# Patient Record
Sex: FEMALE | Race: WHITE | Employment: OTHER | URBAN - METROPOLITAN AREA
[De-identification: names, ages, dates, MRNs, and addresses within clinical notes are randomized per-mention and may not be internally consistent; named-entity substitution may affect disease eponyms.]

---

## 2017-03-02 ENCOUNTER — HOSPITAL ENCOUNTER (EMERGENCY)
Age: 77
Discharge: HOME | End: 2017-03-02
Attending: EMERGENCY MEDICINE
Payer: MEDICARE

## 2017-03-02 VITALS
TEMPERATURE: 97.6 F | RESPIRATION RATE: 19 BRPM | OXYGEN SATURATION: 97 % | BODY MASS INDEX: 21.99 KG/M2 | WEIGHT: 132 LBS | HEART RATE: 74 BPM | SYSTOLIC BLOOD PRESSURE: 153 MMHG | HEIGHT: 65 IN | DIASTOLIC BLOOD PRESSURE: 67 MMHG
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
## 2017-03-02 DIAGNOSIS — N39.0 URINARY TRACT INFECTION WITHOUT HEMATURIA, SITE UNSPECIFIED: Primary | ICD-10-CM

## 2017-03-02 DIAGNOSIS — R53.1 WEAKNESS: ICD-10-CM

## 2017-03-02 LAB
ANION GAP BLD CALC-SCNC: 12 MMOL/L (ref 7–16)
APPEARANCE UR: ABNORMAL
BACTERIA URNS QL MICRO: ABNORMAL /HPF
BASOPHILS # BLD AUTO: 0 K/UL (ref 0–0.2)
BASOPHILS # BLD: 0 % (ref 0–2)
BILIRUB UR QL: NEGATIVE
BUN SERPL-MCNC: 12 MG/DL (ref 8–23)
CALCIUM SERPL-MCNC: 10.1 MG/DL (ref 8.3–10.4)
CASTS URNS QL MICRO: ABNORMAL /LPF
CHLORIDE SERPL-SCNC: 95 MMOL/L (ref 98–107)
CO2 SERPL-SCNC: 27 MMOL/L (ref 21–32)
COLOR UR: YELLOW
CREAT SERPL-MCNC: 0.83 MG/DL (ref 0.6–1)
DIFFERENTIAL METHOD BLD: ABNORMAL
EOSINOPHIL # BLD: 0.1 K/UL (ref 0–0.8)
EOSINOPHIL NFR BLD: 1 % (ref 0.5–7.8)
EPI CELLS #/AREA URNS HPF: 0 /HPF
ERYTHROCYTE [DISTWIDTH] IN BLOOD BY AUTOMATED COUNT: 12.3 % (ref 11.9–14.6)
GLUCOSE SERPL-MCNC: 141 MG/DL (ref 65–100)
GLUCOSE UR STRIP.AUTO-MCNC: NEGATIVE MG/DL
HCT VFR BLD AUTO: 42.3 % (ref 35.8–46.3)
HGB BLD-MCNC: 14.7 G/DL (ref 11.7–15.4)
HGB UR QL STRIP: ABNORMAL
IMM GRANULOCYTES # BLD: 0 K/UL (ref 0–0.5)
IMM GRANULOCYTES NFR BLD AUTO: 0.2 % (ref 0–5)
KETONES UR QL STRIP.AUTO: NEGATIVE MG/DL
LEUKOCYTE ESTERASE UR QL STRIP.AUTO: ABNORMAL
LYMPHOCYTES # BLD AUTO: 17 % (ref 13–44)
LYMPHOCYTES # BLD: 1.5 K/UL (ref 0.5–4.6)
MCH RBC QN AUTO: 30.8 PG (ref 26.1–32.9)
MCHC RBC AUTO-ENTMCNC: 34.8 G/DL (ref 31.4–35)
MCV RBC AUTO: 88.7 FL (ref 79.6–97.8)
MONOCYTES # BLD: 0.7 K/UL (ref 0.1–1.3)
MONOCYTES NFR BLD AUTO: 8 % (ref 4–12)
NEUTS SEG # BLD: 6.6 K/UL (ref 1.7–8.2)
NEUTS SEG NFR BLD AUTO: 74 % (ref 43–78)
NITRITE UR QL STRIP.AUTO: NEGATIVE
PH UR STRIP: 7 [PH] (ref 5–9)
PLATELET # BLD AUTO: 303 K/UL (ref 150–450)
PMV BLD AUTO: 10.6 FL (ref 10.8–14.1)
POTASSIUM SERPL-SCNC: 3.2 MMOL/L (ref 3.5–5.1)
PROT UR STRIP-MCNC: NEGATIVE MG/DL
RBC # BLD AUTO: 4.77 M/UL (ref 4.05–5.25)
RBC #/AREA URNS HPF: ABNORMAL /HPF
SODIUM SERPL-SCNC: 134 MMOL/L (ref 136–145)
SP GR UR REFRACTOMETRY: 1.01 (ref 1–1.02)
UROBILINOGEN UR QL STRIP.AUTO: 0.2 EU/DL (ref 0.2–1)
WBC # BLD AUTO: 8.9 K/UL (ref 4.3–11.1)
WBC URNS QL MICRO: >100 /HPF

## 2017-03-02 PROCEDURE — 87086 URINE CULTURE/COLONY COUNT: CPT | Performed by: EMERGENCY MEDICINE

## 2017-03-02 PROCEDURE — 85025 COMPLETE CBC W/AUTO DIFF WBC: CPT | Performed by: EMERGENCY MEDICINE

## 2017-03-02 PROCEDURE — 96365 THER/PROPH/DIAG IV INF INIT: CPT | Performed by: EMERGENCY MEDICINE

## 2017-03-02 PROCEDURE — 99284 EMERGENCY DEPT VISIT MOD MDM: CPT | Performed by: EMERGENCY MEDICINE

## 2017-03-02 PROCEDURE — 81003 URINALYSIS AUTO W/O SCOPE: CPT | Performed by: EMERGENCY MEDICINE

## 2017-03-02 PROCEDURE — 87088 URINE BACTERIA CULTURE: CPT | Performed by: EMERGENCY MEDICINE

## 2017-03-02 PROCEDURE — 74011000258 HC RX REV CODE- 258: Performed by: EMERGENCY MEDICINE

## 2017-03-02 PROCEDURE — 80048 BASIC METABOLIC PNL TOTAL CA: CPT | Performed by: EMERGENCY MEDICINE

## 2017-03-02 PROCEDURE — 74011250636 HC RX REV CODE- 250/636: Performed by: EMERGENCY MEDICINE

## 2017-03-02 PROCEDURE — 81001 URINALYSIS AUTO W/SCOPE: CPT | Performed by: EMERGENCY MEDICINE

## 2017-03-02 PROCEDURE — 87186 SC STD MICRODIL/AGAR DIL: CPT | Performed by: EMERGENCY MEDICINE

## 2017-03-02 RX ORDER — CEPHALEXIN 500 MG/1
500 CAPSULE ORAL 3 TIMES DAILY
Qty: 21 CAP | Refills: 0 | Status: SHIPPED | OUTPATIENT
Start: 2017-03-02 | End: 2017-03-09

## 2017-03-02 RX ORDER — SODIUM CHLORIDE 0.9 % (FLUSH) 0.9 %
5-10 SYRINGE (ML) INJECTION EVERY 8 HOURS
Status: DISCONTINUED | OUTPATIENT
Start: 2017-03-02 | End: 2017-03-03 | Stop reason: HOSPADM

## 2017-03-02 RX ORDER — SODIUM CHLORIDE 0.9 % (FLUSH) 0.9 %
5-10 SYRINGE (ML) INJECTION AS NEEDED
Status: DISCONTINUED | OUTPATIENT
Start: 2017-03-02 | End: 2017-03-03 | Stop reason: HOSPADM

## 2017-03-02 RX ADMIN — SODIUM CHLORIDE 1000 ML: 900 INJECTION, SOLUTION INTRAVENOUS at 20:55

## 2017-03-02 RX ADMIN — CEFTRIAXONE 1 G: 1 INJECTION, POWDER, FOR SOLUTION INTRAMUSCULAR; INTRAVENOUS at 20:55

## 2017-03-02 NOTE — ED TRIAGE NOTES
Pt in c/o buttock pain after fall. Pt states she tripped and fell back. Denies hitting head denies loc. Pt son states pt has had increased confusion. States pt poured her wellbutrin out yesterday. Pt with abrasion to left forearm. Pt denies other complaints. Pt alert and oriented on triage.

## 2017-03-03 NOTE — DISCHARGE INSTRUCTIONS
Urinary Tract Infection in Women: Care Instructions  Your Care Instructions    A urinary tract infection, or UTI, is a general term for an infection anywhere between the kidneys and the urethra (where urine comes out). Most UTIs are bladder infections. They often cause pain or burning when you urinate. UTIs are caused by bacteria and can be cured with antibiotics. Be sure to complete your treatment so that the infection goes away. Follow-up care is a key part of your treatment and safety. Be sure to make and go to all appointments, and call your doctor if you are having problems. It's also a good idea to know your test results and keep a list of the medicines you take. How can you care for yourself at home? · Take your antibiotics as directed. Do not stop taking them just because you feel better. You need to take the full course of antibiotics. · Drink extra water and other fluids for the next day or two. This may help wash out the bacteria that are causing the infection. (If you have kidney, heart, or liver disease and have to limit fluids, talk with your doctor before you increase your fluid intake.)  · Avoid drinks that are carbonated or have caffeine. They can irritate the bladder. · Urinate often. Try to empty your bladder each time. · To relieve pain, take a hot bath or lay a heating pad set on low over your lower belly or genital area. Never go to sleep with a heating pad in place. To prevent UTIs  · Drink plenty of water each day. This helps you urinate often, which clears bacteria from your system. (If you have kidney, heart, or liver disease and have to limit fluids, talk with your doctor before you increase your fluid intake.)  · Consider adding cranberry juice to your diet. · Urinate when you need to. · Urinate right after you have sex. · Change sanitary pads often. · Avoid douches, bubble baths, feminine hygiene sprays, and other feminine hygiene products that have deodorants.   · After going to the bathroom, wipe from front to back. When should you call for help? Call your doctor now or seek immediate medical care if:  · Symptoms such as fever, chills, nausea, or vomiting get worse or appear for the first time. · You have new pain in your back just below your rib cage. This is called flank pain. · There is new blood or pus in your urine. · You have any problems with your antibiotic medicine. Watch closely for changes in your health, and be sure to contact your doctor if:  · You are not getting better after taking an antibiotic for 2 days. · Your symptoms go away but then come back. Where can you learn more? Go to http://luz marina-norris.info/. Enter Z340 in the search box to learn more about \"Urinary Tract Infection in Women: Care Instructions. \"  Current as of: August 12, 2016  Content Version: 11.1  © 4968-8995 [employer]. Care instructions adapted under license by [employer] (which disclaims liability or warranty for this information). If you have questions about a medical condition or this instruction, always ask your healthcare professional. [place] [address] any warranty or liability for your use of this information. Fatigue: Care Instructions  Your Care Instructions  Fatigue is a feeling of tiredness, exhaustion, or lack of energy. You may feel fatigue because of too much or not enough activity. It can also come from stress, lack of sleep, boredom, and poor diet. Many medical problems, such as viral infections, can cause fatigue. Emotional problems, especially depression, are often the cause of fatigue. Fatigue is most often a symptom of another problem. Treatment for fatigue depends on the cause. For example, if you have fatigue because you have a certain health problem, treating this problem also treats your fatigue. If depression or anxiety is the cause, treatment may help.   Follow-up care is a key part of your treatment and safety. Be sure to make and go to all appointments, and call your doctor if you are having problems. It's also a good idea to know your test results and keep a list of the medicines you take. How can you care for yourself at home? · Get regular exercise. But don't overdo it. Go back and forth between rest and exercise. · Get plenty of rest.  · Eat a healthy diet. Do not skip meals, especially breakfast.  · Reduce your use of caffeine, tobacco, and alcohol. Caffeine is most often found in coffee, tea, cola drinks, and chocolate. · Limit medicines that can cause fatigue. This includes tranquilizers and cold and allergy medicines. When should you call for help? Watch closely for changes in your health, and be sure to contact your doctor if:  · You have new symptoms such as fever or a rash. · Your fatigue gets worse. · You have been feeling down, depressed, or hopeless. Or you may have lost interest in things that you usually enjoy. · You are not getting better as expected. Where can you learn more? Go to http://luz marinaiPerceptionsnorris.info/. Enter X397 in the search box to learn more about \"Fatigue: Care Instructions. \"  Current as of: May 27, 2016  Content Version: 11.1  © 7972-0527 [employer]. Care instructions adapted under license by [employer] (which disclaims liability or warranty for this information). If you have questions about a medical condition or this instruction, always ask your healthcare professional. [place] [address] any warranty or liability for your use of this information. Preventing Falls: Care Instructions  Your Care Instructions  Getting around your home safely can be a challenge if you have injuries or health problems that make it easy for you to fall. Loose rugs and furniture in walkways are among the dangers for many older people who have problems walking or who have poor eyesight.  People who have conditions such as arthritis, osteoporosis, or dementia also have to be careful not to fall. You can make your home safer with a few simple measures. Follow-up care is a key part of your treatment and safety. Be sure to make and go to all appointments, and call your doctor if you are having problems. It's also a good idea to know your test results and keep a list of the medicines you take. How can you care for yourself at home? Taking care of yourself  · You may get dizzy if you do not drink enough water. To prevent dehydration, drink plenty of fluids, enough so that your urine is light yellow or clear like water. Choose water and other caffeine-free clear liquids. If you have kidney, heart, or liver disease and have to limit fluids, talk with your doctor before you increase the amount of fluids you drink. · Exercise regularly to improve your strength, muscle tone, and balance. Walk if you can. Swimming may be a good choice if you cannot walk easily. · Have your vision and hearing checked each year or any time you notice a change. If you have trouble seeing and hearing, you might not be able to avoid objects and could lose your balance. · Know the side effects of the medicines you take. Ask your doctor or pharmacist whether the medicines you take can affect your balance. Sleeping pills or sedatives can affect your balance. · Limit the amount of alcohol you drink. Alcohol can impair your balance and other senses. · Ask your doctor whether calluses or corns on your feet need to be removed. If you wear loose-fitting shoes because of calluses or corns, you can lose your balance and fall. · Talk to your doctor if you have numbness in your feet. Preventing falls at home  · Remove raised doorway thresholds, throw rugs, and clutter. Repair loose carpet or raised areas in the floor. · Move furniture and electrical cords to keep them out of walking paths.   · Use nonskid floor wax, and wipe up spills right away, especially on ceramic tile floors. · If you use a walker or cane, put rubber tips on it. If you use crutches, clean the bottoms of them regularly with an abrasive pad, such as steel wool. · Keep your house well lit, especially Abbi Carrie, and outside walkways. Use night-lights in areas such as hallways and bathrooms. Add extra light switches or use remote switches (such as switches that go on or off when you clap your hands) to make it easier to turn lights on if you have to get up during the night. · Install sturdy handrails on stairways. · Move items in your cabinets so that the things you use a lot are on the lower shelves (about waist level). · Keep a cordless phone and a flashlight with new batteries by your bed. If possible, put a phone in each of the main rooms of your house, or carry a cell phone in case you fall and cannot reach a phone. Or, you can wear a device around your neck or wrist. You push a button that sends a signal for help. · Wear low-heeled shoes that fit well and give your feet good support. Use footwear with nonskid soles. Check the heels and soles of your shoes for wear. Repair or replace worn heels or soles. · Do not wear socks without shoes on wood floors. · Walk on the grass when the sidewalks are slippery. If you live in an area that gets snow and ice in the winter, sprinkle salt on slippery steps and sidewalks. Preventing falls in the bath  · Install grab bars and nonskid mats inside and outside your shower or tub and near the toilet and sinks. · Use shower chairs and bath benches. · Use a hand-held shower head that will allow you to sit while showering. · Get into a tub or shower by putting the weaker leg in first. Get out of a tub or shower with your strong side first.  · Repair loose toilet seats and consider installing a raised toilet seat to make getting on and off the toilet easier. · Keep your bathroom door unlocked while you are in the shower.   Where can you learn more?  Go to http://luz marina-norris.info/. Enter [id number] [id number] in the search box to learn more about \"Preventing Falls: Care Instructions. \"  Current as of: August 4, 2016  Content Version: 11.1  © 0856-6208 [employer]. Care instructions adapted under license by [employer] (which disclaims liability or warranty for this information). If you have questions about a medical condition or this instruction, always ask your healthcare professional. [place] [address] any warranty or liability for your use of this information.

## 2017-03-03 NOTE — ED PROVIDER NOTES
HPI Comments: Patient complains of feeling poorly for the last week with unsteadiness and 2 falls over the last week. She denies any fever or chills, UTI symptoms, nausea or vomiting. Denies change in bowel habits. Patient is a 78 y.o. female presenting with fatigue. The history is provided by the patient and a relative. Fatigue   This is a new problem. The current episode started more than 2 days ago. The problem has been gradually worsening. There was no focality noted. Pertinent negatives include no focal weakness, no loss of sensation, no loss of balance, no slurred speech, no speech difficulty, no memory loss, no movement disorder, no agitation, no visual change, no auditory change, no mental status change and no disorientation. There has been no fever. Pertinent negatives include no shortness of breath, no chest pain, no vomiting, no altered mental status, no confusion, no headaches, no choking, no nausea, no bowel incontinence and no bladder incontinence. There were no medications administered prior to arrival. Associated medical issues do not include trauma, mood changes, bleeding disorder, seizures, dementia, CVA or clotting disorder. Past Medical History:   Diagnosis Date    Menopause        Past Surgical History:   Procedure Laterality Date    HX HYSTERECTOMY      HX OOPHORECTOMY           Family History:   Problem Relation Age of Onset    Breast Cancer Neg Hx        Social History     Social History    Marital status:      Spouse name: N/A    Number of children: N/A    Years of education: N/A     Occupational History    Not on file.      Social History Main Topics    Smoking status: Never Smoker    Smokeless tobacco: Not on file    Alcohol use Not on file    Drug use: Not on file    Sexual activity: Not on file     Other Topics Concern    Not on file     Social History Narrative         ALLERGIES: Codeine and Pcn [penicillins]    Review of Systems   Constitutional: Positive for activity change and fatigue. Negative for appetite change, chills and fever. Respiratory: Negative for choking and shortness of breath. Cardiovascular: Negative for chest pain. Gastrointestinal: Negative for bowel incontinence, nausea and vomiting. Genitourinary: Negative for bladder incontinence. Musculoskeletal: Negative for arthralgias, back pain, neck pain and neck stiffness. Neurological: Positive for weakness. Negative for focal weakness, speech difficulty, headaches and loss of balance. Psychiatric/Behavioral: Negative for agitation, confusion and memory loss. All other systems reviewed and are negative. Vitals:    03/02/17 1824 03/02/17 1923 03/02/17 1940   BP: 178/81 169/75 153/67   Pulse: 76 73 74   Resp: 19     Temp: 97.6 °F (36.4 °C)     SpO2: 98% 98% 97%   Weight: 59.9 kg (132 lb)     Height: 5' 5\" (1.651 m)              Physical Exam   Constitutional: She is oriented to person, place, and time. She appears well-developed and well-nourished. No distress. HENT:   Head: Normocephalic and atraumatic. Right Ear: Tympanic membrane and external ear normal.   Left Ear: Tympanic membrane and external ear normal.   Mouth/Throat: Oropharynx is clear and moist.   Eyes: Conjunctivae and EOM are normal. Pupils are equal, round, and reactive to light. Neck: Normal range of motion. Neck supple. No tracheal deviation present. Cardiovascular: Normal rate, regular rhythm, normal heart sounds and intact distal pulses. Exam reveals no gallop and no friction rub. No murmur heard. Pulmonary/Chest: Effort normal and breath sounds normal. No respiratory distress. She has no wheezes. Abdominal: Soft. Bowel sounds are normal. She exhibits no distension and no mass. There is no hepatosplenomegaly. There is no tenderness. There is no rebound and no guarding. Musculoskeletal: Normal range of motion. She exhibits no edema. Lymphadenopathy:     She has no cervical adenopathy. Neurological: She is alert and oriented to person, place, and time. She displays normal reflexes. No cranial nerve deficit. Skin: Skin is warm and dry. No rash noted. She is not diaphoretic. No erythema. Psychiatric: She has a normal mood and affect. Nursing note and vitals reviewed. MDM  Number of Diagnoses or Management Options  Falls, initial encounter: new and requires workup  Urinary tract infection without hematuria, site unspecified: new and requires workup  Weakness: new and requires workup     Amount and/or Complexity of Data Reviewed  Clinical lab tests: ordered and reviewed  Decide to obtain previous medical records or to obtain history from someone other than the patient: yes  Obtain history from someone other than the patient: yes  Review and summarize past medical records: yes    Risk of Complications, Morbidity, and/or Mortality  Presenting problems: high  Diagnostic procedures: minimal  Management options: moderate    Patient Progress  Patient progress: improved    ED Course       Procedures    The patient was observed in the ED.     Results Reviewed:      Recent Results (from the past 24 hour(s))   URINALYSIS W/ RFLX MICROSCOPIC    Collection Time: 03/02/17  6:30 PM   Result Value Ref Range    Color YELLOW      Appearance CLOUDY      Specific gravity 1.006 1.001 - 1.023      pH (UA) 7.0 5.0 - 9.0      Protein NEGATIVE  NEG mg/dL    Glucose NEGATIVE  mg/dL    Ketone NEGATIVE  NEG mg/dL    Bilirubin NEGATIVE  NEG      Blood TRACE (A) NEG      Urobilinogen 0.2 0.2 - 1.0 EU/dL    Nitrites NEGATIVE  NEG      Leukocyte Esterase LARGE (A) NEG      WBC >100 (H) 0 /hpf    RBC 0-3 0 /hpf    Epithelial cells 0 0 /hpf    Bacteria 4+ (H) 0 /hpf    Casts 3-5 0 /lpf   CBC WITH AUTOMATED DIFF    Collection Time: 03/02/17  8:51 PM   Result Value Ref Range    WBC 8.9 4.3 - 11.1 K/uL    RBC 4.77 4.05 - 5.25 M/uL    HGB 14.7 11.7 - 15.4 g/dL    HCT 42.3 35.8 - 46.3 %    MCV 88.7 79.6 - 97.8 FL    MCH 30.8 26.1 - 32.9 PG    MCHC 34.8 31.4 - 35.0 g/dL    RDW 12.3 11.9 - 14.6 %    PLATELET 750 903 - 158 K/uL    MPV 10.6 (L) 10.8 - 14.1 FL    DF AUTOMATED      NEUTROPHILS 74 43 - 78 %    LYMPHOCYTES 17 13 - 44 %    MONOCYTES 8 4.0 - 12.0 %    EOSINOPHILS 1 0.5 - 7.8 %    BASOPHILS 0 0.0 - 2.0 %    IMMATURE GRANULOCYTES 0.2 0.0 - 5.0 %    ABS. NEUTROPHILS 6.6 1.7 - 8.2 K/UL    ABS. LYMPHOCYTES 1.5 0.5 - 4.6 K/UL    ABS. MONOCYTES 0.7 0.1 - 1.3 K/UL    ABS. EOSINOPHILS 0.1 0.0 - 0.8 K/UL    ABS. BASOPHILS 0.0 0.0 - 0.2 K/UL    ABS. IMM. GRANS. 0.0 0.0 - 0.5 K/UL   METABOLIC PANEL, BASIC    Collection Time: 03/02/17  8:51 PM   Result Value Ref Range    Sodium 134 (L) 136 - 145 mmol/L    Potassium 3.2 (L) 3.5 - 5.1 mmol/L    Chloride 95 (L) 98 - 107 mmol/L    CO2 27 21 - 32 mmol/L    Anion gap 12 7 - 16 mmol/L    Glucose 141 (H) 65 - 100 mg/dL    BUN 12 8 - 23 MG/DL    Creatinine 0.83 0.6 - 1.0 MG/DL    GFR est AA >60 >60 ml/min/1.73m2    GFR est non-AA >60 >60 ml/min/1.73m2    Calcium 10.1 8.3 - 10.4 MG/DL       I discussed the results of all labs, procedures, radiographs, and treatments with the patient and available family. Treatment plan is agreed upon and the patient is ready for discharge. All voiced understanding of the discharge plan and medication instructions or changes as appropriate. Questions about treatment in the ED were answered. All were encouraged to return should symptoms worsen or new problems develop.

## 2017-03-05 NOTE — PROGRESS NOTES
Patient given Rocephin IV in the emergency room and sent home on cephalexin. Await final cultures and susceptibilities.

## 2017-03-06 LAB
BACTERIA SPEC CULT: ABNORMAL
BACTERIA SPEC CULT: ABNORMAL
SERVICE CMNT-IMP: ABNORMAL

## 2017-09-18 ENCOUNTER — APPOINTMENT (RX ONLY)
Dept: URBAN - METROPOLITAN AREA CLINIC 349 | Facility: CLINIC | Age: 77
Setting detail: DERMATOLOGY
End: 2017-09-18

## 2017-09-18 DIAGNOSIS — L21.8 OTHER SEBORRHEIC DERMATITIS: ICD-10-CM

## 2017-09-18 PROCEDURE — ? RECOMMENDATIONS

## 2017-09-18 PROCEDURE — ? PRESCRIPTION

## 2017-09-18 PROCEDURE — ? COUNSELING

## 2017-09-18 PROCEDURE — 99213 OFFICE O/P EST LOW 20 MIN: CPT

## 2017-09-18 RX ORDER — MOMETASONE FUROATE 1 MG/G
CREAM TOPICAL
Qty: 1 | Refills: 1 | Status: ERX | COMMUNITY
Start: 2017-09-18

## 2017-09-18 RX ORDER — KETOCONAZOLE 20 MG/G
CREAM TOPICAL
Qty: 1 | Refills: 1 | Status: ERX | COMMUNITY
Start: 2017-09-18

## 2017-09-18 RX ADMIN — MOMETASONE FUROATE: 1 CREAM TOPICAL at 00:00

## 2017-09-18 RX ADMIN — KETOCONAZOLE: 20 CREAM TOPICAL at 00:00

## 2017-09-18 NOTE — PROCEDURE: RECOMMENDATIONS
Recommendations (Free Text): Ketoconazole cream and mometasone, mix together apply to affected areas on ears nightly for 2-3 weeks
Detail Level: Zone

## 2017-10-02 ENCOUNTER — APPOINTMENT (RX ONLY)
Dept: URBAN - METROPOLITAN AREA CLINIC 349 | Facility: CLINIC | Age: 77
Setting detail: DERMATOLOGY
End: 2017-10-02

## 2017-10-02 DIAGNOSIS — L21.8 OTHER SEBORRHEIC DERMATITIS: ICD-10-CM | Status: IMPROVED

## 2017-10-02 PROCEDURE — 99213 OFFICE O/P EST LOW 20 MIN: CPT

## 2017-10-02 PROCEDURE — ? RECOMMENDATIONS

## 2017-10-02 PROCEDURE — ? COUNSELING

## 2017-10-02 NOTE — PROCEDURE: RECOMMENDATIONS
Detail Level: Zone
Recommendations (Free Text): Continue Ketoconazole cream and mometasone, mix together apply to affected areas on ears nightly for 2-3 weeks as needed for flares

## 2017-12-19 ENCOUNTER — HOSPITAL ENCOUNTER (OUTPATIENT)
Dept: MAMMOGRAPHY | Age: 77
Discharge: HOME | End: 2017-12-19
Attending: INTERNAL MEDICINE
Payer: MEDICARE

## 2017-12-19 DIAGNOSIS — Z12.31 VISIT FOR SCREENING MAMMOGRAM: ICD-10-CM

## 2017-12-19 PROCEDURE — 77067 SCR MAMMO BI INCL CAD: CPT

## 2018-03-15 ENCOUNTER — HOSPITAL ENCOUNTER (OUTPATIENT)
Dept: MAMMOGRAPHY | Age: 78
Discharge: HOME | End: 2018-03-15
Attending: INTERNAL MEDICINE
Payer: MEDICARE

## 2018-03-15 DIAGNOSIS — M81.8 OSTEOPOROSIS, IDIOPATHIC: ICD-10-CM

## 2018-03-15 PROCEDURE — 77080 DXA BONE DENSITY AXIAL: CPT

## 2018-04-16 ENCOUNTER — APPOINTMENT (RX ONLY)
Dept: URBAN - METROPOLITAN AREA CLINIC 349 | Facility: CLINIC | Age: 78
Setting detail: DERMATOLOGY
End: 2018-04-16

## 2018-04-16 DIAGNOSIS — L21.8 OTHER SEBORRHEIC DERMATITIS: ICD-10-CM | Status: WELL CONTROLLED

## 2018-04-16 PROBLEM — I10 ESSENTIAL (PRIMARY) HYPERTENSION: Status: ACTIVE | Noted: 2018-04-16

## 2018-04-16 PROCEDURE — ? RECOMMENDATIONS

## 2018-04-16 PROCEDURE — ? COUNSELING

## 2018-04-16 PROCEDURE — 99213 OFFICE O/P EST LOW 20 MIN: CPT

## 2018-08-09 ENCOUNTER — RX ONLY (OUTPATIENT)
Age: 78
Setting detail: RX ONLY
End: 2018-08-09

## 2018-08-09 RX ORDER — CLOBETASOL PROPIONATE 0.5 MG/G
CREAM TOPICAL
Qty: 1 | Refills: 0 | Status: ERX | COMMUNITY
Start: 2018-08-09

## 2018-10-15 ENCOUNTER — APPOINTMENT (RX ONLY)
Dept: URBAN - METROPOLITAN AREA CLINIC 349 | Facility: CLINIC | Age: 78
Setting detail: DERMATOLOGY
End: 2018-10-15

## 2018-10-15 DIAGNOSIS — L21.8 OTHER SEBORRHEIC DERMATITIS: ICD-10-CM | Status: STABLE

## 2018-10-15 PROBLEM — F41.9 ANXIETY DISORDER, UNSPECIFIED: Status: ACTIVE | Noted: 2018-10-15

## 2018-10-15 PROBLEM — L85.3 XEROSIS CUTIS: Status: ACTIVE | Noted: 2018-10-15

## 2018-10-15 PROBLEM — E13.9 OTHER SPECIFIED DIABETES MELLITUS WITHOUT COMPLICATIONS: Status: ACTIVE | Noted: 2018-10-15

## 2018-10-15 PROBLEM — Z85.828 PERSONAL HISTORY OF OTHER MALIGNANT NEOPLASM OF SKIN: Status: ACTIVE | Noted: 2018-10-15

## 2018-10-15 PROCEDURE — ? RECOMMENDATIONS

## 2018-10-15 PROCEDURE — ? PRESCRIPTION

## 2018-10-15 PROCEDURE — 99213 OFFICE O/P EST LOW 20 MIN: CPT

## 2018-10-15 PROCEDURE — ? COUNSELING

## 2018-10-15 RX ORDER — MOMETASONE FUROATE 1 MG/G
CREAM TOPICAL
Qty: 1 | Refills: 1 | Status: ERX

## 2018-10-15 RX ORDER — KETOCONAZOLE 20 MG/G
CREAM TOPICAL
Qty: 1 | Refills: 1 | Status: ERX

## 2018-10-15 NOTE — PROCEDURE: RECOMMENDATIONS
Detail Level: Zone
Recommendations (Free Text): Pt to finish Clobetasol then stop\\nKetoconazole 2% cream apply nightly for two weeks then as needed \\nMometasone cream apply twice a day for two weeks then as needed

## 2019-01-08 ENCOUNTER — HOSPITAL ENCOUNTER (OUTPATIENT)
Age: 79
Setting detail: OBSERVATION
Discharge: HOME | End: 2019-01-11
Attending: EMERGENCY MEDICINE | Admitting: HOSPITALIST
Payer: MEDICARE

## 2019-01-08 DIAGNOSIS — N39.0 URINARY TRACT INFECTION WITHOUT HEMATURIA, SITE UNSPECIFIED: Primary | ICD-10-CM

## 2019-01-08 DIAGNOSIS — E87.1 HYPONATREMIA: ICD-10-CM

## 2019-01-08 PROBLEM — E11.9 DM (DIABETES MELLITUS) (HCC): Status: ACTIVE | Noted: 2019-01-08

## 2019-01-08 PROBLEM — I10 HTN (HYPERTENSION): Status: ACTIVE | Noted: 2019-01-08

## 2019-01-08 PROBLEM — R19.7 DIARRHEA: Status: ACTIVE | Noted: 2019-01-08

## 2019-01-08 LAB
ANION GAP SERPL CALC-SCNC: 11 MMOL/L
BACTERIA URNS QL MICRO: 0 /HPF
BASOPHILS # BLD: 0.1 K/UL (ref 0–0.2)
BASOPHILS NFR BLD: 0 % (ref 0–2)
BUN SERPL-MCNC: 15 MG/DL (ref 8–23)
CALCIUM SERPL-MCNC: 9 MG/DL (ref 8.3–10.4)
CASTS URNS QL MICRO: ABNORMAL /LPF
CHLORIDE SERPL-SCNC: 86 MMOL/L (ref 98–107)
CO2 SERPL-SCNC: 21 MMOL/L (ref 21–32)
CREAT SERPL-MCNC: 0.81 MG/DL (ref 0.6–1)
DIFFERENTIAL METHOD BLD: ABNORMAL
EOSINOPHIL # BLD: 0.1 K/UL (ref 0–0.8)
EOSINOPHIL NFR BLD: 0 % (ref 0.5–7.8)
EPI CELLS #/AREA URNS HPF: ABNORMAL /HPF
ERYTHROCYTE [DISTWIDTH] IN BLOOD BY AUTOMATED COUNT: 11.5 % (ref 11.9–14.6)
GLUCOSE BLD STRIP.AUTO-MCNC: 172 MG/DL (ref 65–100)
GLUCOSE SERPL-MCNC: 128 MG/DL (ref 65–100)
HCT VFR BLD AUTO: 37.4 % (ref 35.8–46.3)
HGB BLD-MCNC: 13 G/DL (ref 11.7–15.4)
IMM GRANULOCYTES # BLD: 0.1 K/UL (ref 0–0.5)
IMM GRANULOCYTES NFR BLD AUTO: 1 % (ref 0–5)
LYMPHOCYTES # BLD: 0.8 K/UL (ref 0.5–4.6)
LYMPHOCYTES NFR BLD: 4 % (ref 13–44)
MCH RBC QN AUTO: 30.8 PG (ref 26.1–32.9)
MCHC RBC AUTO-ENTMCNC: 34.8 G/DL (ref 31.4–35)
MCV RBC AUTO: 88.6 FL (ref 79.6–97.8)
MONOCYTES # BLD: 1.1 K/UL (ref 0.1–1.3)
MONOCYTES NFR BLD: 6 % (ref 4–12)
NEUTS SEG # BLD: 16.2 K/UL (ref 1.7–8.2)
NEUTS SEG NFR BLD: 89 % (ref 43–78)
NRBC # BLD: 0 K/UL (ref 0–0.2)
PLATELET # BLD AUTO: 325 K/UL (ref 150–450)
PMV BLD AUTO: 10 FL (ref 9.4–12.3)
POTASSIUM SERPL-SCNC: 3.4 MMOL/L (ref 3.5–5.1)
RBC # BLD AUTO: 4.22 M/UL (ref 4.05–5.2)
RBC #/AREA URNS HPF: ABNORMAL /HPF
SODIUM SERPL-SCNC: 118 MMOL/L (ref 136–145)
WBC # BLD AUTO: 18.3 K/UL (ref 4.3–11.1)
WBC URNS QL MICRO: >100 /HPF

## 2019-01-08 PROCEDURE — 96365 THER/PROPH/DIAG IV INF INIT: CPT

## 2019-01-08 PROCEDURE — 74011250636 HC RX REV CODE- 250/636: Performed by: HOSPITALIST

## 2019-01-08 PROCEDURE — 87088 URINE BACTERIA CULTURE: CPT

## 2019-01-08 PROCEDURE — 82962 GLUCOSE BLOOD TEST: CPT

## 2019-01-08 PROCEDURE — 96361 HYDRATE IV INFUSION ADD-ON: CPT | Performed by: EMERGENCY MEDICINE

## 2019-01-08 PROCEDURE — 65270000029 HC RM PRIVATE

## 2019-01-08 PROCEDURE — 87186 SC STD MICRODIL/AGAR DIL: CPT

## 2019-01-08 PROCEDURE — 96360 HYDRATION IV INFUSION INIT: CPT | Performed by: EMERGENCY MEDICINE

## 2019-01-08 PROCEDURE — 84300 ASSAY OF URINE SODIUM: CPT

## 2019-01-08 PROCEDURE — 81015 MICROSCOPIC EXAM OF URINE: CPT

## 2019-01-08 PROCEDURE — 83935 ASSAY OF URINE OSMOLALITY: CPT

## 2019-01-08 PROCEDURE — 74011250636 HC RX REV CODE- 250/636: Performed by: EMERGENCY MEDICINE

## 2019-01-08 PROCEDURE — 74011000258 HC RX REV CODE- 258: Performed by: EMERGENCY MEDICINE

## 2019-01-08 PROCEDURE — 85025 COMPLETE CBC W/AUTO DIFF WBC: CPT

## 2019-01-08 PROCEDURE — 99284 EMERGENCY DEPT VISIT MOD MDM: CPT | Performed by: EMERGENCY MEDICINE

## 2019-01-08 PROCEDURE — 74011250637 HC RX REV CODE- 250/637: Performed by: HOSPITALIST

## 2019-01-08 PROCEDURE — 87086 URINE CULTURE/COLONY COUNT: CPT

## 2019-01-08 PROCEDURE — 65390000012 HC CONDITION CODE 44 OBSERVATION

## 2019-01-08 PROCEDURE — 80048 BASIC METABOLIC PNL TOTAL CA: CPT

## 2019-01-08 RX ORDER — INSULIN LISPRO 100 [IU]/ML
INJECTION, SOLUTION INTRAVENOUS; SUBCUTANEOUS
Status: DISCONTINUED | OUTPATIENT
Start: 2019-01-09 | End: 2019-01-11 | Stop reason: HOSPADM

## 2019-01-08 RX ORDER — ACETAMINOPHEN 325 MG/1
650 TABLET ORAL
Status: DISCONTINUED | OUTPATIENT
Start: 2019-01-08 | End: 2019-01-11 | Stop reason: HOSPADM

## 2019-01-08 RX ORDER — DEXTROSE 50 % IN WATER (D50W) INTRAVENOUS SYRINGE
25-50 AS NEEDED
Status: DISCONTINUED | OUTPATIENT
Start: 2019-01-08 | End: 2019-01-11 | Stop reason: HOSPADM

## 2019-01-08 RX ORDER — ONDANSETRON 2 MG/ML
4 INJECTION INTRAMUSCULAR; INTRAVENOUS
Status: DISCONTINUED | OUTPATIENT
Start: 2019-01-08 | End: 2019-01-11 | Stop reason: HOSPADM

## 2019-01-08 RX ORDER — SODIUM CHLORIDE 0.9 % (FLUSH) 0.9 %
5-40 SYRINGE (ML) INJECTION AS NEEDED
Status: DISCONTINUED | OUTPATIENT
Start: 2019-01-08 | End: 2019-01-11 | Stop reason: HOSPADM

## 2019-01-08 RX ORDER — SODIUM CHLORIDE 0.9 % (FLUSH) 0.9 %
5-40 SYRINGE (ML) INJECTION EVERY 8 HOURS
Status: DISCONTINUED | OUTPATIENT
Start: 2019-01-08 | End: 2019-01-11 | Stop reason: HOSPADM

## 2019-01-08 RX ORDER — LOPERAMIDE HYDROCHLORIDE 2 MG/1
4 CAPSULE ORAL ONCE
Status: COMPLETED | OUTPATIENT
Start: 2019-01-08 | End: 2019-01-08

## 2019-01-08 RX ORDER — SODIUM CHLORIDE 9 MG/ML
1000 INJECTION, SOLUTION INTRAVENOUS ONCE
Status: COMPLETED | OUTPATIENT
Start: 2019-01-08 | End: 2019-01-09

## 2019-01-08 RX ORDER — DEXTROSE 40 %
15 GEL (GRAM) ORAL AS NEEDED
Status: DISCONTINUED | OUTPATIENT
Start: 2019-01-08 | End: 2019-01-11 | Stop reason: HOSPADM

## 2019-01-08 RX ORDER — SODIUM CHLORIDE 9 MG/ML
50 INJECTION, SOLUTION INTRAVENOUS CONTINUOUS
Status: DISCONTINUED | OUTPATIENT
Start: 2019-01-08 | End: 2019-01-09

## 2019-01-08 RX ADMIN — SODIUM CHLORIDE 1000 ML: 900 INJECTION, SOLUTION INTRAVENOUS at 21:05

## 2019-01-08 RX ADMIN — LOPERAMIDE HYDROCHLORIDE 4 MG: 2 CAPSULE ORAL at 23:17

## 2019-01-08 RX ADMIN — SODIUM CHLORIDE 125 ML/HR: 900 INJECTION, SOLUTION INTRAVENOUS at 23:19

## 2019-01-08 RX ADMIN — Medication 5 ML: at 23:00

## 2019-01-08 RX ADMIN — CEFTRIAXONE 1 G: 1 INJECTION, POWDER, FOR SOLUTION INTRAMUSCULAR; INTRAVENOUS at 22:07

## 2019-01-09 PROBLEM — N39.0 UTI (URINARY TRACT INFECTION): Status: ACTIVE | Noted: 2019-01-09

## 2019-01-09 LAB
ANION GAP SERPL CALC-SCNC: 8 MMOL/L
BUN SERPL-MCNC: 11 MG/DL (ref 8–23)
CALCIUM SERPL-MCNC: 8.5 MG/DL (ref 8.3–10.4)
CHLORIDE SERPL-SCNC: 93 MMOL/L (ref 98–107)
CO2 SERPL-SCNC: 24 MMOL/L (ref 21–32)
CREAT SERPL-MCNC: 0.86 MG/DL (ref 0.6–1)
EST. AVERAGE GLUCOSE BLD GHB EST-MCNC: 131 MG/DL
GLUCOSE BLD STRIP.AUTO-MCNC: 165 MG/DL (ref 65–100)
GLUCOSE BLD STRIP.AUTO-MCNC: 166 MG/DL (ref 65–100)
GLUCOSE BLD STRIP.AUTO-MCNC: 196 MG/DL (ref 65–100)
GLUCOSE BLD STRIP.AUTO-MCNC: 219 MG/DL (ref 65–100)
GLUCOSE SERPL-MCNC: 137 MG/DL (ref 65–100)
HBA1C MFR BLD: 6.2 %
MAGNESIUM SERPL-MCNC: 1.9 MG/DL (ref 1.8–2.4)
OSMOLALITY SERPL: 267 MOSM/KG H2O
OSMOLALITY UR: 415 MOSM/KG H2O
PHOSPHATE SERPL-MCNC: 2.2 MG/DL (ref 2.3–3.7)
POTASSIUM SERPL-SCNC: 3.6 MMOL/L (ref 3.5–5.1)
SODIUM SERPL-SCNC: 125 MMOL/L (ref 136–145)
SODIUM SERPL-SCNC: 130 MMOL/L (ref 136–145)
SODIUM UR-SCNC: 53 MMOL/L
TSH SERPL DL<=0.005 MIU/L-ACNC: 2.58 UIU/ML

## 2019-01-09 PROCEDURE — 83036 HEMOGLOBIN GLYCOSYLATED A1C: CPT

## 2019-01-09 PROCEDURE — 83930 ASSAY OF BLOOD OSMOLALITY: CPT

## 2019-01-09 PROCEDURE — 74011636637 HC RX REV CODE- 636/637: Performed by: HOSPITALIST

## 2019-01-09 PROCEDURE — 84100 ASSAY OF PHOSPHORUS: CPT

## 2019-01-09 PROCEDURE — 99218 HC RM OBSERVATION: CPT

## 2019-01-09 PROCEDURE — 96366 THER/PROPH/DIAG IV INF ADDON: CPT

## 2019-01-09 PROCEDURE — 65390000012 HC CONDITION CODE 44 OBSERVATION

## 2019-01-09 PROCEDURE — 80048 BASIC METABOLIC PNL TOTAL CA: CPT

## 2019-01-09 PROCEDURE — 84443 ASSAY THYROID STIM HORMONE: CPT

## 2019-01-09 PROCEDURE — 74011250637 HC RX REV CODE- 250/637: Performed by: HOSPITALIST

## 2019-01-09 PROCEDURE — 96361 HYDRATE IV INFUSION ADD-ON: CPT

## 2019-01-09 PROCEDURE — 83735 ASSAY OF MAGNESIUM: CPT

## 2019-01-09 PROCEDURE — 74011000258 HC RX REV CODE- 258: Performed by: HOSPITALIST

## 2019-01-09 PROCEDURE — 74011250636 HC RX REV CODE- 250/636: Performed by: HOSPITALIST

## 2019-01-09 PROCEDURE — 84295 ASSAY OF SERUM SODIUM: CPT

## 2019-01-09 PROCEDURE — 82962 GLUCOSE BLOOD TEST: CPT

## 2019-01-09 RX ORDER — BUPROPION HYDROCHLORIDE 300 MG/1
300 TABLET ORAL
COMMUNITY

## 2019-01-09 RX ORDER — INSULIN LISPRO 100 [IU]/ML
5 INJECTION, SOLUTION INTRAVENOUS; SUBCUTANEOUS
Status: DISCONTINUED | OUTPATIENT
Start: 2019-01-09 | End: 2019-01-11 | Stop reason: HOSPADM

## 2019-01-09 RX ORDER — INSULIN GLARGINE 100 [IU]/ML
10 INJECTION, SOLUTION SUBCUTANEOUS DAILY
Status: DISCONTINUED | OUTPATIENT
Start: 2019-01-09 | End: 2019-01-11 | Stop reason: HOSPADM

## 2019-01-09 RX ORDER — OMEPRAZOLE 40 MG/1
40 CAPSULE, DELAYED RELEASE ORAL DAILY
COMMUNITY
End: 2021-02-01 | Stop reason: ALTCHOICE

## 2019-01-09 RX ORDER — SAME BUTANEDISULFONATE/BETAINE 400-600 MG
250 POWDER IN PACKET (EA) ORAL 2 TIMES DAILY
Status: DISCONTINUED | OUTPATIENT
Start: 2019-01-09 | End: 2019-01-09

## 2019-01-09 RX ORDER — ALENDRONATE SODIUM 70 MG/1
70 TABLET ORAL
COMMUNITY
End: 2019-06-21

## 2019-01-09 RX ORDER — METFORMIN HYDROCHLORIDE 1000 MG/1
1000 TABLET ORAL DAILY
COMMUNITY
End: 2019-04-03

## 2019-01-09 RX ORDER — SULFAMETHOXAZOLE AND TRIMETHOPRIM 800; 160 MG/1; MG/1
1 TABLET ORAL 2 TIMES DAILY
COMMUNITY
Start: 2019-01-03 | End: 2019-01-11

## 2019-01-09 RX ORDER — ATORVASTATIN CALCIUM 40 MG/1
40 TABLET, FILM COATED ORAL
COMMUNITY

## 2019-01-09 RX ORDER — LOPERAMIDE HYDROCHLORIDE 2 MG/1
4 CAPSULE ORAL
Status: DISCONTINUED | OUTPATIENT
Start: 2019-01-09 | End: 2019-01-11 | Stop reason: HOSPADM

## 2019-01-09 RX ORDER — INSULIN GLARGINE 100 [IU]/ML
11 INJECTION, SOLUTION SUBCUTANEOUS DAILY
Status: DISCONTINUED | OUTPATIENT
Start: 2019-01-09 | End: 2019-01-09

## 2019-01-09 RX ORDER — MOMETASONE FUROATE 1 MG/G
OINTMENT TOPICAL DAILY
COMMUNITY
End: 2019-04-03

## 2019-01-09 RX ORDER — AMLODIPINE BESYLATE 10 MG/1
10 TABLET ORAL DAILY
COMMUNITY
End: 2019-06-21

## 2019-01-09 RX ORDER — POTASSIUM CHLORIDE 20 MEQ/1
40 TABLET, EXTENDED RELEASE ORAL
Status: COMPLETED | OUTPATIENT
Start: 2019-01-09 | End: 2019-01-09

## 2019-01-09 RX ORDER — ALPRAZOLAM 0.25 MG/1
0.25 TABLET ORAL
COMMUNITY
End: 2019-04-03

## 2019-01-09 RX ORDER — VALSARTAN 320 MG/1
320 TABLET ORAL DAILY
COMMUNITY
End: 2019-01-26

## 2019-01-09 RX ORDER — INSULIN LISPRO 100 [IU]/ML
2 INJECTION, SOLUTION INTRAVENOUS; SUBCUTANEOUS
Status: DISCONTINUED | OUTPATIENT
Start: 2019-01-09 | End: 2019-01-09

## 2019-01-09 RX ADMIN — CEFTRIAXONE SODIUM 2 G: 2 INJECTION, POWDER, FOR SOLUTION INTRAMUSCULAR; INTRAVENOUS at 16:40

## 2019-01-09 RX ADMIN — INSULIN LISPRO 2 UNITS: 100 INJECTION, SOLUTION INTRAVENOUS; SUBCUTANEOUS at 22:11

## 2019-01-09 RX ADMIN — POTASSIUM CHLORIDE 40 MEQ: 20 TABLET, EXTENDED RELEASE ORAL at 00:46

## 2019-01-09 RX ADMIN — Medication 10 ML: at 22:13

## 2019-01-09 RX ADMIN — INSULIN GLARGINE 10 UNITS: 100 INJECTION, SOLUTION SUBCUTANEOUS at 09:54

## 2019-01-09 RX ADMIN — INSULIN LISPRO 5 UNITS: 100 INJECTION, SOLUTION INTRAVENOUS; SUBCUTANEOUS at 16:44

## 2019-01-09 RX ADMIN — INSULIN LISPRO 4 UNITS: 100 INJECTION, SOLUTION INTRAVENOUS; SUBCUTANEOUS at 09:07

## 2019-01-09 RX ADMIN — INSULIN LISPRO 2 UNITS: 100 INJECTION, SOLUTION INTRAVENOUS; SUBCUTANEOUS at 16:44

## 2019-01-09 NOTE — ED NOTES
Patient's Daughter, Shanika Barrientos, is leaving her cell number for any new information or concerns. [phone]

## 2019-01-09 NOTE — PROGRESS NOTES
Received call for VR nurse, and have passed on the information to the appropiate  about pt observation status and in need of a code 40 letter. 1500 Letter was provided via Bruce Olszewski CM on 1/9/19 and a a copy is on the pt's chart. Virtual Utilization Review RN  has determined this patient meets the Condition Code 44 criteria under the Medicare guidelines for change from Inpatient to observation status. Admission status has been changed in the computer system. A copy of the Utilization Review RN documentation and the PÉREZ letter explaining the outpatient/observation services was given to patient/family representative with verbal explanation and verbal understanding was received about information given. Letter signed by patient with date and time. Signed copy placed in the patient's chart for scanning by HIS and copy left at bedside for patient information.

## 2019-01-09 NOTE — PROGRESS NOTES
[facility] initial ER visit made by [facility]. Support given. Card left. SEVERO Abreu Div / Bereavement Coordinator

## 2019-01-09 NOTE — ED TRIAGE NOTES
Pt states that she has been treated for a UTI. Developed diarrhea today and and daughter was concerned about her blood pressure being low

## 2019-01-09 NOTE — ED PROVIDER NOTES
Patient presents complaining of dysuria and diarrhea. Dysuria started today the diarrhea started a day to day and a half ago. Patient was seen by her primary care physician last week with similar symptoms and prescribed Bactrim. The symptoms resolved so she stopped taking the Bactrim. She denies any fever or chills she denies any flank pain she does complain of some suprapubic pain as well as the dysuria. Diarrhea has been watery but improves after Imodium. She denies any blood in the diarrhea. The history is provided by the patient. Diarrhea This is a new problem. The current episode started 2 days ago. The problem occurs daily. The problem has not changed since onset. Associated with: UTI symptoms and recent antibiotic use. The pain is located in the suprapubic region. The pain is at a severity of 2/10. The pain is mild. Associated symptoms include diarrhea, dysuria and frequency. Pertinent negatives include no anorexia, no fever, no belching, no flatus, no hematochezia, no melena, no nausea, no vomiting, no constipation, no hematuria, no headaches, no arthralgias, no myalgias, no trauma, no chest pain and no back pain. Past Medical History:  
Diagnosis Date  Diabetes ([facility] [address]  Endocrine disease  Hypertension  Menopause Past Surgical History:  
Procedure Laterality Date  HX HYSTERECTOMY  HX OOPHORECTOMY Family History:  
Problem Relation Age of Onset  Breast Cancer Neg Hx Social History Socioeconomic History  Marital status:  Spouse name: Not on file  Number of children: Not on file  Years of education: Not on file  Highest education level: Not on file Social Needs  Financial resource strain: Not on file  Food insecurity - worry: Not on file  Food insecurity - inability: Not on file  Transportation needs - medical: Not on file  Transportation needs - non-medical: Not on file Occupational History  Not on file Tobacco Use  Smoking status: Never Smoker  Smokeless tobacco: Never Used Substance and Sexual Activity  Alcohol use: No  
  Frequency: Never  Drug use: No  
 Sexual activity: Not Currently Other Topics Concern  Not on file Social History Narrative  Not on file ALLERGIES: Codeine and Pcn [penicillins] Review of Systems Constitutional: Negative for fever. Cardiovascular: Negative for chest pain. Gastrointestinal: Positive for diarrhea. Negative for anorexia, constipation, flatus, hematochezia, melena, nausea and vomiting. Genitourinary: Positive for dysuria and frequency. Negative for hematuria. Musculoskeletal: Negative for arthralgias, back pain and myalgias. Neurological: Negative for headaches. All other systems reviewed and are negative. Vitals:  
 01/08/19 1912 BP: 141/64 Pulse: 79 Resp: 16 Temp: 98 °F (36.7 °C) SpO2: 100% Weight: 56.7 kg (125 lb) Height: 5' 4\" (1.626 m) Physical Exam  
Constitutional: She is oriented to person, place, and time. She appears well-developed and well-nourished. No distress. HENT:  
Head: Normocephalic and atraumatic. Eyes: Conjunctivae and EOM are normal. Pupils are equal, round, and reactive to light. Neck: Normal range of motion. Neck supple. Cardiovascular: Normal rate and regular rhythm. Pulmonary/Chest: Effort normal and breath sounds normal.  
Abdominal: Soft. Bowel sounds are normal. There is tenderness. Mild suprapubic tenderness Musculoskeletal: Normal range of motion. Neurological: She is alert and oriented to person, place, and time. No cranial nerve deficit. Skin: Skin is warm and dry. She is not diaphoretic. Psychiatric: She has a normal mood and affect. Her behavior is normal.  
Nursing note and vitals reviewed. MDM Number of Diagnoses or Management Options Hyponatremia:  
Urinary tract infection without hematuria, site unspecified: Amount and/or Complexity of Data Reviewed Clinical lab tests: ordered and reviewed Risk of Complications, Morbidity, and/or Mortality Presenting problems: moderate Diagnostic procedures: moderate Management options: moderate Patient Progress Patient progress: stable Procedures

## 2019-01-09 NOTE — PHYSICIAN ADVISORY
Letter of Determination:  Outpatient status receiving Observation Services This patient was originally hospitalized as Inpatient Status on 1/8/2019 for diarrhea. At this time this patient does not appear to meet the medical necessity requirements to support an inpatient level of care. It is our recommendation that this patient's hospitalization status should be changed from INPATIENT to [place] receiving OBSERVATION services via Condition Code 44.  
  
This may change due to the medical condition of the patient and new clinical evidence as the patients care progreses. The final decision regarding the patient's hospitalization status depends on the attending physician's judgement. Tri Blas MD, LACEY, Physician Advisor [address] [place] [address].

## 2019-01-09 NOTE — ED NOTES
TRANSFER - OUT REPORT: 
 
Verbal report given to DENNIS green on Skeet Flavin  being transferred to [address] [place] [address] for routine progression of care Report consisted of patients Situation, Background, Assessment and  
Recommendations(SBAR). Information from the following report(s) SBAR, ED Summary, Intake/Output and MAR was reviewed with the receiving nurse. Lines:  
Peripheral IV 01/08/19 Right Antecubital (Active) Site Assessment Clean, dry, & intact 1/8/2019  9:11 PM  
Phlebitis Assessment 0 1/8/2019  9:11 PM  
Infiltration Assessment 0 1/8/2019  9:11 PM  
Dressing Status Clean, dry, & intact 1/8/2019  9:11 PM  
  
 
Opportunity for questions and clarification was provided. Patient transported with: 
 [employer]

## 2019-01-09 NOTE — PROGRESS NOTES
TRANSFER - IN REPORT: 
 
Verbal report received from [facility] (name) on Tay Bingham  being received from ER (unit) for routine progression of care Report consisted of patients Situation, Background, Assessment and  
Recommendations(SBAR). Information from the following report(s) SBAR, Kardex, Intake/Output, MAR and Recent Results was reviewed with the receiving nurse. Opportunity for questions and clarification was provided. Assessment completed upon patients arrival to unit and care assumed.

## 2019-01-09 NOTE — PROGRESS NOTES
Visited with patient. Received awake and alert ×4 sitting up in the chair fully dressed. Demographics on the sheet verified. Patient states she is completely independent in all activities of daily living, does not use any ambulation assistive devices, and still drives. She is current with Dr. Lonnie Herzog who she states \"put me in here\". She has 3 grown children who are all in the area and very supportive. No needs identified at this time. Please consult  if any new issues arise. Care Management Interventions PCP Verified by CM: Yes(last seen a couple of days ago) Mode of Transport at Discharge: Self Transition of Care Consult (CM Consult): Discharge Planning Current Support Network: Own Home, Lives Alone Confirm Follow Up Transport: Self Plan discussed with Pt/Family/Caregiver: Yes Freedom of Choice Offered: Yes Discharge Location Discharge Placement: Home

## 2019-01-09 NOTE — ED NOTES
Patient is sitting at bedside, eating her breakfast tray. Patient does not drink regular coffee and requested decaf coffee. I went to the cafeteria to retrieve the decaf coffee and delivered it to the patient. Patient's daughter is with her at this time.

## 2019-01-09 NOTE — PROGRESS NOTES
01/09/19 1326 Dual Skin Pressure Injury Assessment Dual Skin Pressure Injury Assessment WDL Second Care Provider (Based on [address] [place] [address]) Hal Villarreal, RN Skin Integumentary Skin Integumentary (WDL) WDL Pressure  Injury Documentation No Pressure Injury Noted-Pressure Ulcer Prevention Initiated

## 2019-01-09 NOTE — PROGRESS NOTES
Admit assessment complete, see flowsheet notes. Pt resting in bed and is alert and oriented x 4. She denies pain and is on room air. RR even and unlabored. IVF infusing. Patient oriented to call light, room, and staff and instructed to call for assistance if needed. Will monitor.

## 2019-01-09 NOTE — H&P
Hospitalist H&P/Consult Note Admit Date:  2019  8:28 PM  
Name:  Thomas Ochoa Age:  70 y.o. 
:  1940 MRN:  [id number] PCP:  Vivia Gottron, MD 
Treatment Team: Attending Provider: Miranda Man MD; Primary Nurse: Brennan Becker RN 
 
HPI:  
Patient is a very pleasant 69 y/o female with hx DM, HTN who presents to ED with acute onset profuse diarrhea starting today. No fever or chills. Some lower abdominal discomfort. She was initially seen by PCP last Thursday and diagnosed with a UTI and prescribed Bactrim DS. She only took 1 and a half doses because of severe stomach upset. The diarrhea started today. No suspect foods. No hx C diff. In ED she has stable vitals but WBC ct elevated to 18. 3. UA has >100 WBC. Sodium low at 118 and K 3.4. Stool for C difficile pending. She has experienced no seizure or confusion with the hyponatremia. Do not see that she is on any diuretics. Hospitalist service consulted to admit. 10 systems reviewed and negative except as noted in HPI. Past Medical History:  
Diagnosis Date  Diabetes ([address]  Endocrine disease  Hypertension  Menopause Past Surgical History:  
Procedure Laterality Date  HX HYSTERECTOMY  HX OOPHORECTOMY Prior to Admission Medications Prescriptions Last Dose Informant Patient Reported? Taking?  
insulin NPH/insulin regular (HUMULIN 70/30 U-100 INSULIN) 100 unit/mL (70-30) injection   Yes Yes Sig: 10 Units by SubCUTAneous route two (2) times a day. Facility-Administered Medications: None Allergies Allergen Reactions  Codeine Other (comments)  Pcn [Penicillins] Unknown (comments) Social History Tobacco Use  Smoking status: Never Smoker  Smokeless tobacco: Never Used Substance Use Topics  Alcohol use: No  
  Frequency: Never Family History Problem Relation Age of Onset  Breast Cancer Neg Hx There is no immunization history on file for this patient. Objective:  
 
Patient Vitals for the past 24 hrs: 
 Temp Pulse Resp BP SpO2  
01/08/19 1912 98 °F (36.7 °C) 79 16 141/64 100 % Oxygen Therapy O2 Sat (%): 100 % (01/08/19 1912) O2 Device: Room air (01/08/19 1912) No intake or output data in the 24 hours ending 01/09/19 0007 Physical Exam: 
General:    Well nourished. Alert. Eyes:   Normal sclera. Extraocular movements intact. ENT:  Normocephalic, atraumatic. Moist mucous membranes CV:   RRR. No murmur, rub, or gallop. Lungs:  CTAB. No wheezing, rhonchi, or rales. Abdomen: Soft, nontender, nondistended. Bowel sounds normal.  
No flank tenderness. Perhaps mild suprapubic tenderness Extremities: Warm and dry. No cyanosis or edema. Neurologic: CN II-XII grossly intact. Sensation intact. Skin:     No rashes or jaundice. No wounds. Psych:  Normal mood and affect. I reviewed the labs, imaging, EKGs, telemetry, and other studies done this admission. Data Review:  
Recent Results (from the past 24 hour(s)) URINE MICROSCOPIC Collection Time: 01/08/19  8:37 PM  
Result Value Ref Range WBC >100 (H) 0 /hpf  
 RBC 3-5 0 /hpf Epithelial cells 0-3 0 /hpf Bacteria 0 0 /hpf Casts 0-3 0 /lpf  
CBC WITH AUTOMATED DIFF Collection Time: 01/08/19  9:08 PM  
Result Value Ref Range WBC 18.3 (H) 4.3 - 11.1 K/uL  
 RBC 4.22 4.05 - 5.2 M/uL  
 HGB 13.0 11.7 - 15.4 g/dL HCT 37.4 35.8 - 46.3 % MCV 88.6 79.6 - 97.8 FL  
 MCH 30.8 26.1 - 32.9 PG  
 MCHC 34.8 31.4 - 35.0 g/dL  
 RDW 11.5 (L) 11.9 - 14.6 % PLATELET 791 412 - 777 K/uL MPV 10.0 9.4 - 12.3 FL ABSOLUTE NRBC 0.00 0.0 - 0.2 K/uL  
 DF AUTOMATED NEUTROPHILS 89 (H) 43 - 78 % LYMPHOCYTES 4 (L) 13 - 44 % MONOCYTES 6 4.0 - 12.0 % EOSINOPHILS 0 (L) 0.5 - 7.8 % BASOPHILS 0 0.0 - 2.0 % IMMATURE GRANULOCYTES 1 0.0 - 5.0 %  
 ABS. NEUTROPHILS 16.2 (H) 1.7 - 8.2 K/UL ABS. LYMPHOCYTES 0.8 0.5 - 4.6 K/UL  
 ABS. MONOCYTES 1.1 0.1 - 1.3 K/UL  
 ABS. EOSINOPHILS 0.1 0.0 - 0.8 K/UL  
 ABS. BASOPHILS 0.1 0.0 - 0.2 K/UL  
 ABS. IMM. GRANS. 0.1 0.0 - 0.5 K/UL METABOLIC PANEL, BASIC Collection Time: 01/08/19  9:08 PM  
Result Value Ref Range Sodium 118 (LL) 136 - 145 mmol/L Potassium 3.4 (L) 3.5 - 5.1 mmol/L Chloride 86 (L) 98 - 107 mmol/L  
 CO2 21 21 - 32 mmol/L Anion gap 11 mmol/L Glucose 128 (H) 65 - 100 mg/dL BUN 15 8 - 23 MG/DL Creatinine 0.81 0.6 - 1.0 MG/DL  
 GFR est AA >60 >60 ml/min/1.73m2 GFR est non-AA >60 ml/min/1.73m2 Calcium 9.0 8.3 - 10.4 MG/DL  
GLUCOSE, POC Collection Time: 01/08/19 11:15 PM  
Result Value Ref Range Glucose (POC) 172 (H) 65 - 100 mg/dL Imaging Studies: CXR Results  (Last 48 hours) None CT Results  (Last 48 hours) None Assessment and Plan:  
 
Hospital Problems as of 1/9/2019 Never Reviewed Codes Class Noted - Resolved POA Acute hyponatremia ICD-10-CM: E87.1 ICD-9-CM: 276.1  1/8/2019 - Present Yes * (Principal) Acute UTI (urinary tract infection) ICD-10-CM: N39.0 ICD-9-CM: 599.0  1/8/2019 - Present Yes Diarrhea ICD-10-CM: R19.7 ICD-9-CM: 787.91  1/8/2019 - Present Yes HTN (hypertension) ICD-10-CM: I10 
ICD-9-CM: 401.9  1/8/2019 - Present Yes DM (diabetes mellitus) ([facility] ICD-10-CM: E11.9 ICD-9-CM: 250.00  1/8/2019 - Present Yes PLAN: 
· Admit patient to medical bed · Suspect patient's acute hyponatremia secondary to volume loss associated with acute diarrheal illness. Likely diarrhea due to acute self-limited viral source. She had only taken 1 and a half bactrim tabs for UTI and not sure this is enough to cause C difficile infection. Send stool for C diff, culture and WBC · May have 1 dose 4 mg immodium · Start PO probiotic (Florastor) · Will monitor BMP Q 6 hours to prevent correcting too rapidly · Replace potassium, check mag and phos · Check urine and serum Osm. Spot Ur Na · Check thyroid functions · Diabetic diet with SSI · Continue rocephin for UTI. F/u urine culture · Place SCDs for DVT prophylaxis · Discussed with family in room Estimated LOS:  2 midnights Signed: 
Sumi Chen MD

## 2019-01-09 NOTE — PROGRESS NOTES
Hospitalist  
Admit Date:  2019  8:28 PM  
Name:  Deny Blackwood Age:  70 y.o. 
:  1940 MRN:  [id number] PCP:  Malaika Steele MD 
Treatment Team: Attending Provider: Navid Horton MD; Utilization Review: Nolvia Desai RN Subj:  
 
Patient is a very pleasant 71 y/o female with hx DM, HTN who presents to ED with acute onset profuse diarrhea starting today. No fever or chills. Some lower abdominal discomfort. She was initially seen by PCP last Thursday and diagnosed with a UTI and prescribed Bactrim DS. She only took 1 and a half doses because of severe stomach upset. The diarrhea started today. No suspect foods. No hx C diff. In ED she has stable vitals but WBC ct elevated to 18. 3. UA has >100 WBC. Sodium low at 118 and K 3.4. Stool for C difficile pending. She has experienced no seizure or confusion with the hyponatremia. Do not see that she is on any diuretics. Hospitalist service consulted to admit. Today, no diarrhea, no fever, feels better, sitting in a chair Objective:  
 
Patient Vitals for the past 24 hrs: 
 Temp Pulse Resp BP SpO2  
19 1258 95.8 °F (35.4 °C) 73 18 137/61 99 % 19 1216 98.4 °F (36.9 °C) 70 18 161/67 100 % 19 1032  70 18 159/69 100 % 19 0539 97.8 °F (36.6 °C) 76 12 122/59 100 % 19 0529 98 °F (36.7 °C) 77 16 123/56 100 % 19 1912 98 °F (36.7 °C) 79 16 141/64 100 % Oxygen Therapy O2 Sat (%): 99 % (19 1258) Pulse via Oximetry: 70 beats per minute (19 1032) O2 Device: Room air (19 1216) Intake/Output Summary (Last 24 hours) at 2019 1534 Last data filed at 2019 3832 Gross per 24 hour Intake 1050 ml Output  Net 1050 ml Physical Exam: 
General:    Well nourished. Alert. CV:   RRR. No murmur, rub, or gallop. Lungs:  CTAB. No wheezing, rhonchi, or rales. Abdomen: Soft, nontender, nondistended.  Bowel sounds normal.  
 Extremities: Warm and dry. No cyanosis or edema. Neurologic: grossly intact. Skin:     No rashes or jaundice. No wounds. Psych:  Normal mood and affect. Forgetful (daughter correct answers) I reviewed the labs, imaging, EKGs, telemetry, and other studies done this admission. Data Review:  
Recent Results (from the past 24 hour(s)) URINE MICROSCOPIC Collection Time: 01/08/19  8:37 PM  
Result Value Ref Range WBC >100 (H) 0 /hpf  
 RBC 3-5 0 /hpf Epithelial cells 0-3 0 /hpf Bacteria 0 0 /hpf Casts 0-3 0 /lpf OSMOLALITY, UR Collection Time: 01/08/19  8:37 PM  
Result Value Ref Range Osmolality,urine 415 MOSM/kg H2O  
SODIUM, UR, RANDOM Collection Time: 01/08/19  8:37 PM  
Result Value Ref Range Sodium,urine random 53 MMOL/L  
CBC WITH AUTOMATED DIFF Collection Time: 01/08/19  9:08 PM  
Result Value Ref Range WBC 18.3 (H) 4.3 - 11.1 K/uL  
 RBC 4.22 4.05 - 5.2 M/uL  
 HGB 13.0 11.7 - 15.4 g/dL HCT 37.4 35.8 - 46.3 % MCV 88.6 79.6 - 97.8 FL  
 MCH 30.8 26.1 - 32.9 PG  
 MCHC 34.8 31.4 - 35.0 g/dL  
 RDW 11.5 (L) 11.9 - 14.6 % PLATELET 662 552 - 508 K/uL MPV 10.0 9.4 - 12.3 FL ABSOLUTE NRBC 0.00 0.0 - 0.2 K/uL  
 DF AUTOMATED NEUTROPHILS 89 (H) 43 - 78 % LYMPHOCYTES 4 (L) 13 - 44 % MONOCYTES 6 4.0 - 12.0 % EOSINOPHILS 0 (L) 0.5 - 7.8 % BASOPHILS 0 0.0 - 2.0 % IMMATURE GRANULOCYTES 1 0.0 - 5.0 %  
 ABS. NEUTROPHILS 16.2 (H) 1.7 - 8.2 K/UL  
 ABS. LYMPHOCYTES 0.8 0.5 - 4.6 K/UL  
 ABS. MONOCYTES 1.1 0.1 - 1.3 K/UL  
 ABS. EOSINOPHILS 0.1 0.0 - 0.8 K/UL  
 ABS. BASOPHILS 0.1 0.0 - 0.2 K/UL  
 ABS. IMM. GRANS. 0.1 0.0 - 0.5 K/UL METABOLIC PANEL, BASIC Collection Time: 01/08/19  9:08 PM  
Result Value Ref Range Sodium 118 (LL) 136 - 145 mmol/L Potassium 3.4 (L) 3.5 - 5.1 mmol/L Chloride 86 (L) 98 - 107 mmol/L  
 CO2 21 21 - 32 mmol/L Anion gap 11 mmol/L Glucose 128 (H) 65 - 100 mg/dL  BUN 15 8 - 23 MG/DL  
 Creatinine 0.81 0.6 - 1.0 MG/DL  
 GFR est AA >60 >60 ml/min/1.73m2 GFR est non-AA >60 ml/min/1.73m2 Calcium 9.0 8.3 - 10.4 MG/DL  
GLUCOSE, POC Collection Time: 01/08/19 11:15 PM  
Result Value Ref Range Glucose (POC) 172 (H) 65 - 100 mg/dL GLUCOSE, POC Collection Time: 01/09/19  1:29 AM  
Result Value Ref Range Glucose (POC) 166 (H) 65 - 100 mg/dL HEMOGLOBIN A1C WITH EAG Collection Time: 01/09/19  3:29 AM  
Result Value Ref Range Hemoglobin A1c 6.2 % Est. average glucose 131 mg/dL METABOLIC PANEL, BASIC Collection Time: 01/09/19  3:29 AM  
Result Value Ref Range Sodium 125 (L) 136 - 145 mmol/L Potassium 3.6 3.5 - 5.1 mmol/L Chloride 93 (L) 98 - 107 mmol/L  
 CO2 24 21 - 32 mmol/L Anion gap 8 mmol/L Glucose 137 (H) 65 - 100 mg/dL BUN 11 8 - 23 MG/DL Creatinine 0.86 0.6 - 1.0 MG/DL  
 GFR est AA >60 >60 ml/min/1.73m2 GFR est non-AA >60 ml/min/1.73m2 Calcium 8.5 8.3 - 10.4 MG/DL MAGNESIUM Collection Time: 01/09/19  3:29 AM  
Result Value Ref Range Magnesium 1.9 1.8 - 2.4 mg/dL PHOSPHORUS Collection Time: 01/09/19  3:29 AM  
Result Value Ref Range Phosphorus 2.2 (L) 2.3 - 3.7 MG/DL  
OSMOLALITY, SERUM/PLASMA Collection Time: 01/09/19  3:29 AM  
Result Value Ref Range Osmolality, serum/plasma 267 MOSM/kg H2O  
TSH 3RD GENERATION Collection Time: 01/09/19  3:29 AM  
Result Value Ref Range TSH 2.580 uIU/mL GLUCOSE, POC Collection Time: 01/09/19  8:07 AM  
Result Value Ref Range Glucose (POC) 219 (H) 65 - 100 mg/dL SODIUM Collection Time: 01/09/19  2:19 PM  
Result Value Ref Range Sodium 130 (L) 136 - 145 mmol/L Imaging Studies: CXR Results  (Last 48 hours) None CT Results  (Last 48 hours) None Assessment and Plan:  
 
Hospital Problems as of 1/9/2019 Never Reviewed Codes Class Noted - Resolved POA  
 UTI (urinary tract infection) ICD-10-CM: N39.0 ICD-9-CM: 599.0  1/9/2019 - Present Unknown Acute hyponatremia ICD-10-CM: E87.1 ICD-9-CM: 276.1  1/8/2019 - Present Yes * (Principal) Acute UTI (urinary tract infection) ICD-10-CM: N39.0 ICD-9-CM: 599.0  1/8/2019 - Present Yes Diarrhea ICD-10-CM: R19.7 ICD-9-CM: 787.91  1/8/2019 - Present Yes HTN (hypertension) ICD-10-CM: I10 
ICD-9-CM: 401.9  1/8/2019 - Present Yes DM [facility]diabetes mellitus[facility] ICD-10-CM: E11.9 ICD-9-CM: 250.00  1/8/2019 - Present Yes PLAN: 
Na level +12, IVF stopped Continue Rocephin iv and follow cultures Insulin scale BP control DispO; home in 1-2 days Signed: 
Radha Noonan MD

## 2019-01-10 LAB
GLUCOSE BLD STRIP.AUTO-MCNC: 119 MG/DL (ref 65–100)
GLUCOSE BLD STRIP.AUTO-MCNC: 132 MG/DL (ref 65–100)
GLUCOSE BLD STRIP.AUTO-MCNC: 140 MG/DL (ref 65–100)
GLUCOSE BLD STRIP.AUTO-MCNC: 163 MG/DL (ref 65–100)
SODIUM SERPL-SCNC: 136 MMOL/L (ref 136–145)

## 2019-01-10 PROCEDURE — 82962 GLUCOSE BLOOD TEST: CPT

## 2019-01-10 PROCEDURE — 74011000258 HC RX REV CODE- 258: Performed by: HOSPITALIST

## 2019-01-10 PROCEDURE — 74011250636 HC RX REV CODE- 250/636: Performed by: HOSPITALIST

## 2019-01-10 PROCEDURE — 74011636637 HC RX REV CODE- 636/637: Performed by: HOSPITALIST

## 2019-01-10 PROCEDURE — 74011250637 HC RX REV CODE- 250/637: Performed by: INTERNAL MEDICINE

## 2019-01-10 PROCEDURE — 99218 HC RM OBSERVATION: CPT

## 2019-01-10 PROCEDURE — 36415 COLL VENOUS BLD VENIPUNCTURE: CPT

## 2019-01-10 PROCEDURE — 96366 THER/PROPH/DIAG IV INF ADDON: CPT

## 2019-01-10 PROCEDURE — 84295 ASSAY OF SERUM SODIUM: CPT

## 2019-01-10 RX ORDER — LORAZEPAM 0.5 MG/1
0.5 TABLET ORAL
Status: DISCONTINUED | OUTPATIENT
Start: 2019-01-10 | End: 2019-01-11 | Stop reason: HOSPADM

## 2019-01-10 RX ORDER — SAME BUTANEDISULFONATE/BETAINE 400-600 MG
250 POWDER IN PACKET (EA) ORAL 2 TIMES DAILY
Status: DISCONTINUED | OUTPATIENT
Start: 2019-01-10 | End: 2019-01-11 | Stop reason: HOSPADM

## 2019-01-10 RX ADMIN — INSULIN LISPRO 5 UNITS: 100 INJECTION, SOLUTION INTRAVENOUS; SUBCUTANEOUS at 17:23

## 2019-01-10 RX ADMIN — Medication 5 ML: at 22:32

## 2019-01-10 RX ADMIN — Medication 250 MG: at 22:32

## 2019-01-10 RX ADMIN — INSULIN LISPRO 5 UNITS: 100 INJECTION, SOLUTION INTRAVENOUS; SUBCUTANEOUS at 12:22

## 2019-01-10 RX ADMIN — LORAZEPAM 0.5 MG: 0.5 TABLET ORAL at 20:41

## 2019-01-10 RX ADMIN — INSULIN LISPRO 2 UNITS: 100 INJECTION, SOLUTION INTRAVENOUS; SUBCUTANEOUS at 22:29

## 2019-01-10 RX ADMIN — INSULIN GLARGINE 10 UNITS: 100 INJECTION, SOLUTION SUBCUTANEOUS at 07:59

## 2019-01-10 RX ADMIN — INSULIN LISPRO 5 UNITS: 100 INJECTION, SOLUTION INTRAVENOUS; SUBCUTANEOUS at 08:00

## 2019-01-10 RX ADMIN — CEFTRIAXONE SODIUM 2 G: 2 INJECTION, POWDER, FOR SOLUTION INTRAMUSCULAR; INTRAVENOUS at 17:24

## 2019-01-10 NOTE — PHYSICIAN ADVISORY
Letter of Determination:  Outpatient Status receiving Observation Services This case was reviewed, and I concur with Outpatient status receiving Observation services. This determination may change depending on further medical information, condition changes of the patient, or treatment requirements. Anand Aguirre MD, LACEY, Physician Advisor [address] [place] [address].

## 2019-01-10 NOTE — PROGRESS NOTES
Problem: Interdisciplinary Rounds Goal: Interdisciplinary Rounds Interdisciplinary team rounds were held 1/10/2019 with the following team members:Care Management, Nursing, Nutrition, Pharmacy, Physical Therapy and Physician and the patient and child(chris). Plan of care discussed. See clinical pathway and/or care plan for interventions and desired outcomes.

## 2019-01-10 NOTE — PROGRESS NOTES
PM assessment complete. Alert, oriented x 4. Respirations even and unlabored, no distress noted. Denies needs or pain at this time. Multiple family members at bedside. Will continue to monitor.

## 2019-01-11 VITALS
DIASTOLIC BLOOD PRESSURE: 65 MMHG | BODY MASS INDEX: 21.34 KG/M2 | OXYGEN SATURATION: 99 % | HEIGHT: 64 IN | SYSTOLIC BLOOD PRESSURE: 138 MMHG | TEMPERATURE: 95.6 F | RESPIRATION RATE: 16 BRPM | HEART RATE: 63 BPM | WEIGHT: 125 LBS

## 2019-01-11 LAB
ANION GAP SERPL CALC-SCNC: 8 MMOL/L
BACTERIA SPEC CULT: ABNORMAL
BASOPHILS # BLD: 0 K/UL (ref 0–0.2)
BASOPHILS NFR BLD: 1 % (ref 0–2)
BUN SERPL-MCNC: 16 MG/DL (ref 8–23)
CALCIUM SERPL-MCNC: 8.7 MG/DL (ref 8.3–10.4)
CHLORIDE SERPL-SCNC: 102 MMOL/L (ref 98–107)
CO2 SERPL-SCNC: 25 MMOL/L (ref 21–32)
CREAT SERPL-MCNC: 0.75 MG/DL (ref 0.6–1)
DIFFERENTIAL METHOD BLD: ABNORMAL
EOSINOPHIL # BLD: 0.1 K/UL (ref 0–0.8)
EOSINOPHIL NFR BLD: 2 % (ref 0.5–7.8)
ERYTHROCYTE [DISTWIDTH] IN BLOOD BY AUTOMATED COUNT: 12.1 % (ref 11.9–14.6)
GLUCOSE BLD STRIP.AUTO-MCNC: 147 MG/DL (ref 65–100)
GLUCOSE SERPL-MCNC: 125 MG/DL (ref 65–100)
HCT VFR BLD AUTO: 35.4 % (ref 35.8–46.3)
HGB BLD-MCNC: 12 G/DL (ref 11.7–15.4)
IMM GRANULOCYTES # BLD AUTO: 0 K/UL (ref 0–0.5)
IMM GRANULOCYTES NFR BLD AUTO: 0 % (ref 0–5)
LYMPHOCYTES # BLD: 1.9 K/UL (ref 0.5–4.6)
LYMPHOCYTES NFR BLD: 39 % (ref 13–44)
MCH RBC QN AUTO: 31.2 PG (ref 26.1–32.9)
MCHC RBC AUTO-ENTMCNC: 33.9 G/DL (ref 31.4–35)
MCV RBC AUTO: 91.9 FL (ref 79.6–97.8)
MONOCYTES # BLD: 0.4 K/UL (ref 0.1–1.3)
MONOCYTES NFR BLD: 9 % (ref 4–12)
NEUTS SEG # BLD: 2.4 K/UL (ref 1.7–8.2)
NEUTS SEG NFR BLD: 48 % (ref 43–78)
NRBC # BLD: 0 K/UL (ref 0–0.2)
PLATELET # BLD AUTO: 314 K/UL (ref 150–450)
PMV BLD AUTO: 10 FL (ref 9.4–12.3)
POTASSIUM SERPL-SCNC: 3.7 MMOL/L (ref 3.5–5.1)
RBC # BLD AUTO: 3.85 M/UL (ref 4.05–5.2)
SERVICE CMNT-IMP: ABNORMAL
SODIUM SERPL-SCNC: 135 MMOL/L (ref 136–145)
WBC # BLD AUTO: 4.9 K/UL (ref 4.3–11.1)

## 2019-01-11 PROCEDURE — 82962 GLUCOSE BLOOD TEST: CPT

## 2019-01-11 PROCEDURE — 85025 COMPLETE CBC W/AUTO DIFF WBC: CPT

## 2019-01-11 PROCEDURE — 80048 BASIC METABOLIC PNL TOTAL CA: CPT

## 2019-01-11 PROCEDURE — 36415 COLL VENOUS BLD VENIPUNCTURE: CPT

## 2019-01-11 PROCEDURE — 74011636637 HC RX REV CODE- 636/637: Performed by: HOSPITALIST

## 2019-01-11 PROCEDURE — 74011250637 HC RX REV CODE- 250/637: Performed by: INTERNAL MEDICINE

## 2019-01-11 PROCEDURE — 99218 HC RM OBSERVATION: CPT

## 2019-01-11 RX ORDER — CEFPODOXIME PROXETIL 200 MG/1
200 TABLET, FILM COATED ORAL 2 TIMES DAILY
Qty: 4 TAB | Refills: 0 | Status: SHIPPED | OUTPATIENT
Start: 2019-01-11 | End: 2019-01-26

## 2019-01-11 RX ORDER — SAME BUTANEDISULFONATE/BETAINE 400-600 MG
250 POWDER IN PACKET (EA) ORAL 2 TIMES DAILY
Qty: 14 CAP | Refills: 0 | Status: SHIPPED | OUTPATIENT
Start: 2019-01-11 | End: 2019-01-18

## 2019-01-11 RX ADMIN — Medication 250 MG: at 08:23

## 2019-01-11 RX ADMIN — INSULIN LISPRO 5 UNITS: 100 INJECTION, SOLUTION INTRAVENOUS; SUBCUTANEOUS at 08:24

## 2019-01-11 NOTE — DISCHARGE INSTRUCTIONS
Disposition: home   Activity: As Tolerated  Diet: DIET DIABETIC CONSISTENT CARB Regular  Follow up with PCP in 1 week      DISCHARGE SUMMARY from Nurse    PATIENT INSTRUCTIONS:    After general anesthesia or intravenous sedation, for 24 hours or while taking prescription Narcotics:  · Limit your activities  · Do not drive and operate hazardous machinery  · Do not make important personal or business decisions  · Do  not drink alcoholic beverages  · If you have not urinated within 8 hours after discharge, please contact your surgeon on call. Report the following to your surgeon:  · Excessive pain, swelling, redness or odor of or around the surgical area  · Temperature over 100.5  · Nausea and vomiting lasting longer than 4 hours or if unable to take medications  · Any signs of decreased circulation or nerve impairment to extremity: change in color, persistent  numbness, tingling, coldness or increase pain  · Any questions    What to do at Home:  Recommended activity: Activity as tolerated, see above instructions    If you experience any of the following symptoms fever, persistent nausea/vomting, uncontrolled pain, other concerns, please follow up with PCP. *  Please give a list of your current medications to your Primary Care Provider. *  Please update this list whenever your medications are discontinued, doses are      changed, or new medications (including over-the-counter products) are added. *  Please carry medication information at all times in case of emergency situations. These are general instructions for a healthy lifestyle:    No smoking/ No tobacco products/ Avoid exposure to second hand smoke  Surgeon General's Warning:  Quitting smoking now greatly reduces serious risk to your health.     Obesity, smoking, and sedentary lifestyle greatly increases your risk for illness    A healthy diet, regular physical exercise & weight monitoring are important for maintaining a healthy lifestyle    You may be retaining fluid if you have a history of heart failure or if you experience any of the following symptoms:  Weight gain of 3 pounds or more overnight or 5 pounds in a week, increased swelling in our hands or feet or shortness of breath while lying flat in bed. Please call your doctor as soon as you notice any of these symptoms; do not wait until your next office visit. Recognize signs and symptoms of STROKE:    F-face looks uneven    A-arms unable to move or move unevenly    S-speech slurred or non-existent    T-time-call 911 as soon as signs and symptoms begin-DO NOT go       Back to bed or wait to see if you get better-TIME IS BRAIN. Warning Signs of HEART ATTACK     Call 911 if you have these symptoms:   Chest discomfort. Most heart attacks involve discomfort in the center of the chest that lasts more than a few minutes, or that goes away and comes back. It can feel like uncomfortable pressure, squeezing, fullness, or pain.  Discomfort in other areas of the upper body. Symptoms can include pain or discomfort in one or both arms, the back, neck, jaw, or stomach.  Shortness of breath with or without chest discomfort.  Other signs may include breaking out in a cold sweat, nausea, or lightheadedness. Don't wait more than five minutes to call 911 - MINUTES MATTER! Fast action can save your life. Calling 911 is almost always the fastest way to get lifesaving treatment. Emergency Medical Services staff can begin treatment when they arrive -- up to an hour sooner than if someone gets to the hospital by car. The discharge information has been reviewed with the patient. The patient verbalized understanding. Discharge medications reviewed with the patient and appropriate educational materials and side effects teaching were provided.   ___________________________________________________________________________________________________________________________________    Patient Education Urinary Tract Infection in Women: Care Instructions  Your Care Instructions    A urinary tract infection, or UTI, is a general term for an infection anywhere between the kidneys and the urethra (where urine comes out). Most UTIs are bladder infections. They often cause pain or burning when you urinate. UTIs are caused by bacteria and can be cured with antibiotics. Be sure to complete your treatment so that the infection goes away. Follow-up care is a key part of your treatment and safety. Be sure to make and go to all appointments, and call your doctor if you are having problems. It's also a good idea to know your test results and keep a list of the medicines you take. How can you care for yourself at home? · Take your antibiotics as directed. Do not stop taking them just because you feel better. You need to take the full course of antibiotics. · Drink extra water and other fluids for the next day or two. This may help wash out the bacteria that are causing the infection. (If you have kidney, heart, or liver disease and have to limit fluids, talk with your doctor before you increase your fluid intake.)  · Avoid drinks that are carbonated or have caffeine. They can irritate the bladder. · Urinate often. Try to empty your bladder each time. · To relieve pain, take a hot bath or lay a heating pad set on low over your lower belly or genital area. Never go to sleep with a heating pad in place. To prevent UTIs  · Drink plenty of water each day. This helps you urinate often, which clears bacteria from your system. (If you have kidney, heart, or liver disease and have to limit fluids, talk with your doctor before you increase your fluid intake.)  · Urinate when you need to. · Urinate right after you have sex. · Change sanitary pads often. · Avoid douches, bubble baths, feminine hygiene sprays, and other feminine hygiene products that have deodorants.   · After going to the bathroom, wipe from front to back.  When should you call for help? Call your doctor now or seek immediate medical care if:    · Symptoms such as fever, chills, nausea, or vomiting get worse or appear for the first time.     · You have new pain in your back just below your rib cage. This is called flank pain.     · There is new blood or pus in your urine.     · You have any problems with your antibiotic medicine.    Watch closely for changes in your health, and be sure to contact your doctor if:    · You are not getting better after taking an antibiotic for 2 days.     · Your symptoms go away but then come back. Where can you learn more? Go to http://luz marina-norris.info/. Enter H576 in the search box to learn more about \"Urinary Tract Infection in Women: Care Instructions. \"  Current as of: March 21, 2018  Content Version: 11.8  © 0177-3072 [employer]. Care instructions adapted under license by [employer] (which disclaims liability or warranty for this information). If you have questions about a medical condition or this instruction, always ask your healthcare professional. [address] any warranty or liability for your use of this information.

## 2019-01-11 NOTE — DISCHARGE SUMMARY
Hospitalist Discharge Summary     Admit Date:  2019  8:28 PM   Name:  Jose Bower   Age:  70 y.o.  :  1940   MRN:  [id number]   PCP:  Candice Wan MD  Treatment Team: Attending Provider: Shannon Villagomez MD; Utilization Review: Tristan Mao RN    Problem List for this Hospitalization:  Hospital Problems as of 2019 Never Reviewed          Codes Class Noted - Resolved POA    UTI (urinary tract infection) ICD-10-CM: N39.0  ICD-9-CM: 599.0  2019 - Present Unknown        Acute hyponatremia ICD-10-CM: E87.1  ICD-9-CM: 276.1  2019 - Present Yes        * (Principal) Acute UTI (urinary tract infection) ICD-10-CM: N39.0  ICD-9-CM: 599.0  2019 - Present Yes        Diarrhea ICD-10-CM: R19.7  ICD-9-CM: 787.91  2019 - Present Yes        HTN (hypertension) ICD-10-CM: I10  ICD-9-CM: 401.9  2019 - Present Yes        DM [facility]diabetes mellitus) ([facility] ICD-10-CM: E11.9  ICD-9-CM: 250.00  2019 - Present Yes                Admission HPI from 2019:    \"Patient is a very pleasant 69 y/o female with hx DM, HTN who presents to ED with acute onset profuse diarrhea starting today. No fever or chills. Some lower abdominal discomfort. She was initially seen by PCP last Thursday and diagnosed with a UTI and prescribed Bactrim DS. She only took 1 and a half doses because of severe stomach upset. The diarrhea started today. No suspect foods. No hx C diff. In ED she has stable vitals but WBC ct elevated to 18. 3. UA has >100 WBC. Sodium low at 118 and K 3.4. Stool for C difficile pending. She has experienced no seizure or confusion with the hyponatremia. Do not see that she is on any diuretics. Hospitalist service consulted to admit.     10 systems reviewed and negative except as noted in HPI. \"    Hospital Course: The patient was admitted and continued on iv abx. Her diarrhea resolved here and she did not have any since admission.  Cultures grew out Klebsiella Pneumonia pan sensitive. She is stable for discharge to today to follow up with her PCP. Follow up instructions below. Plan was discussed with Pt and IDT. All questions answered. Patient was stable at time of discharge and was instructed to call or return if there are any concerns or recurrence of symptoms. Diagnostic Imaging/Tests:   No results found. Echocardiogram results:  No results found for this visit on 01/08/19. All Micro Results     Procedure Component Value Units Date/Time    CULTURE, URINE [[id number]]  (Abnormal)  (Susceptibility) Collected:  01/08/19 2037    Order Status:  Completed Specimen:  Clean catch Updated:  01/11/19 0653     Special Requests: NO SPECIAL REQUESTS        Culture result:       10,000 to 50,000 COLONIES/mL KLEBSIELLA PNEUMONIAE          C. DIFFICILE AG & TOXIN A/B [[id number]] Collected:  01/09/19 2130    Order Status:  No result     C. DIFFICILE/EPI PCR [[id number]] Collected:  01/09/19 0110    Order Status:  Canceled Specimen:  Stool     CULTURE, STOOL [[id number]]     Order Status:  Canceled Specimen:  Stool           Labs: Results:       BMP, Mg, Phos Recent Labs     01/11/19  0603 01/10/19  0620 01/09/19  1419 01/09/19  0329 01/08/19  2108   * 136 130* 125* 118*   K 3.7  --   --  3.6 3.4*     --   --  93* 86*   CO2 25  --   --  24 21   AGAP 8  --   --  8 11   BUN 16  --   --  11 15   CREA 0.75  --   --  0.86 0.81   CA 8.7  --   --  8.5 9.0   *  --   --  137* 128*   MG  --   --   --  1.9  --    PHOS  --   --   --  2.2*  --       CBC Recent Labs     01/11/19  0603 01/08/19  2108   WBC 4.9 18.3*   RBC 3.85* 4.22   HGB 12.0 13.0   HCT 35.4* 37.4    325   GRANS 48 89*   LYMPH 39 4*   EOS 2 0*   MONOS 9 6   BASOS 1 0   IG 0 1   ANEU 2.4 16.2*   ABL 1.9 0.8   LUIS ANTONIO 0.1 0.1   ABM 0.4 1.1   ABB 0.0 0.1   AIG 0.0 0.1      LFT No results for input(s): SGOT, ALT, TBIL, AP, TP, ALB, GLOB, AGRAT, GPT in the last 72 hours.    Cardiac Testing No results found for: BNPP, BNP, CPK, RCK1, RCK2, RCK3, RCK4, CKMB, CKNDX, CKND1, TROPT, TROIQ   Coagulation Tests No results found for: PTP, INR, APTT   A1c Lab Results   Component Value Date/Time    Hemoglobin A1c 6.2 01/09/2019 03:29 AM      Lipid Panel No results found for: CHOL, CHOLPOCT, CHOLX, CHLST, CHOLV, 242657, HDL, LDL, LDLC, DLDLP, 223548, VLDLC, VLDL, TGLX, TRIGL, TRIGP, TGLPOCT, CHHD, [address]  [facility]   Thyroid Panel Lab Results   Component Value Date/Time    TSH 2.580 01/09/2019 03:29 AM        Most Recent UA Lab Results   Component Value Date/Time    Color YELLOW 03/02/2017 06:30 PM    Appearance CLOUDY 03/02/2017 06:30 PM    Specific gravity 1.006 03/02/2017 06:30 PM    pH (UA) 7.0 03/02/2017 06:30 PM    Protein NEGATIVE  03/02/2017 06:30 PM    Glucose NEGATIVE  03/02/2017 06:30 PM    Ketone NEGATIVE  03/02/2017 06:30 PM    Bilirubin NEGATIVE  03/02/2017 06:30 PM    Blood TRACE (A) 03/02/2017 06:30 PM    Urobilinogen 0.2 03/02/2017 06:30 PM    Nitrites NEGATIVE  03/02/2017 06:30 PM    Leukocyte Esterase LARGE (A) 03/02/2017 06:30 PM        Allergies   Allergen Reactions    Codeine Other (comments)    Pcn [Penicillins] Unknown (comments)       There is no immunization history on file for this patient.     All Labs from Last 24 Hrs:  Recent Results (from the past 24 hour(s))   GLUCOSE, POC    Collection Time: 01/10/19 11:22 AM   Result Value Ref Range    Glucose (POC) 119 (H) 65 - 100 mg/dL   GLUCOSE, POC    Collection Time: 01/10/19  4:39 PM   Result Value Ref Range    Glucose (POC) 140 (H) 65 - 100 mg/dL   GLUCOSE, POC    Collection Time: 01/10/19 10:25 PM   Result Value Ref Range    Glucose (POC) 163 (H) 65 - 100 mg/dL   CBC WITH AUTOMATED DIFF    Collection Time: 01/11/19  6:03 AM   Result Value Ref Range    WBC 4.9 4.3 - 11.1 K/uL    RBC 3.85 (L) 4.05 - 5.2 M/uL    HGB 12.0 11.7 - 15.4 g/dL    HCT 35.4 (L) 35.8 - 46.3 %    MCV 91.9 79.6 - 97.8 FL    MCH 31.2 26.1 - 32.9 PG    MCHC 33.9 31.4 - 35.0 g/dL    RDW 12.1 11.9 - 14.6 % PLATELET 990 372 - 333 K/uL    MPV 10.0 9.4 - 12.3 FL    ABSOLUTE NRBC 0.00 0.0 - 0.2 K/uL    DF AUTOMATED      NEUTROPHILS 48 43 - 78 %    LYMPHOCYTES 39 13 - 44 %    MONOCYTES 9 4.0 - 12.0 %    EOSINOPHILS 2 0.5 - 7.8 %    BASOPHILS 1 0.0 - 2.0 %    IMMATURE GRANULOCYTES 0 0.0 - 5.0 %    ABS. NEUTROPHILS 2.4 1.7 - 8.2 K/UL    ABS. LYMPHOCYTES 1.9 0.5 - 4.6 K/UL    ABS. MONOCYTES 0.4 0.1 - 1.3 K/UL    ABS. EOSINOPHILS 0.1 0.0 - 0.8 K/UL    ABS. BASOPHILS 0.0 0.0 - 0.2 K/UL    ABS. IMM. GRANS. 0.0 0.0 - 0.5 K/UL   METABOLIC PANEL, BASIC    Collection Time: 01/11/19  6:03 AM   Result Value Ref Range    Sodium 135 (L) 136 - 145 mmol/L    Potassium 3.7 3.5 - 5.1 mmol/L    Chloride 102 98 - 107 mmol/L    CO2 25 21 - 32 mmol/L    Anion gap 8 mmol/L    Glucose 125 (H) 65 - 100 mg/dL    BUN 16 8 - 23 MG/DL    Creatinine 0.75 0.6 - 1.0 MG/DL    GFR est AA >60 >60 ml/min/1.73m2    GFR est non-AA >60 ml/min/1.73m2    Calcium 8.7 8.3 - 10.4 MG/DL   GLUCOSE, POC    Collection Time: 01/11/19  7:30 AM   Result Value Ref Range    Glucose (POC) 147 (H) 65 - 100 mg/dL       Discharge Exam:  Patient Vitals for the past 24 hrs:   Temp Pulse Resp BP SpO2   01/11/19 0732 95.6 °F (35.3 °C) 63 16 138/65 99 %   01/11/19 0319 97.7 °F (36.5 °C) 63 14 118/66 97 %   01/11/19 0106 97.9 °F (36.6 °C) 65 12 136/71 96 %   01/10/19 2054 97.3 °F (36.3 °C) 72 18 149/55 94 %   01/10/19 1600 97.2 °F (36.2 °C) 70 18 149/69 100 %   01/10/19 1200 97.9 °F (36.6 °C) 69 18 135/69 98 %     Oxygen Therapy  O2 Sat (%): 99 % (01/11/19 0732)  Pulse via Oximetry: 70 beats per minute (01/09/19 1032)  O2 Device: Room air (01/09/19 1216)  No intake or output data in the 24 hours ending 01/11/19 0823    General:    Well nourished. Alert. No distress. Eyes:   Normal sclera. Extraocular movements intact. ENT:  Normocephalic, atraumatic. Moist mucous membranes  CV:   Regular rate and rhythm. No murmur, rub, or gallop. Lungs:  Clear to auscultation bilaterally. No wheezing, rhonchi, or rales. Abdomen: Soft, nontender, nondistended. Bowel sounds normal.   Extremities: Warm and dry. No cyanosis or edema. Neurologic: CN II-XII grossly intact. Sensation intact. Skin:     No rashes or jaundice. Psych:  Normal mood and affect. Discharge Info:   Current Discharge Medication List      START taking these medications    Details   Saccharomyces boulardii (FLORASTOR) 250 mg capsule Take 1 Cap by mouth two (2) times a day for 7 days. Qty: 14 Cap, Refills: 0      cefpodoxime (VANTIN) 200 mg tablet Take 1 Tab by mouth two (2) times a day. Qty: 4 Tab, Refills: 0         CONTINUE these medications which have NOT CHANGED    Details   ALPRAZolam (XANAX) 0.25 mg tablet Take 0.25 mg by mouth three (3) times daily as needed for Anxiety. alendronate (FOSAMAX) 70 mg tablet Take 70 mg by mouth.      metFORMIN (GLUCOPHAGE) 1,000 mg tablet Take 1,000 mg by mouth daily. buPROPion XL (WELLBUTRIN XL) 300 mg XL tablet Take 300 mg by mouth every morning. valsartan (DIOVAN) 320 mg tablet Take 320 mg by mouth daily. atorvastatin (LIPITOR) 40 mg tablet Take 40 mg by mouth daily. omeprazole (PRILOSEC) 40 mg capsule Take 40 mg by mouth daily. amLODIPine (NORVASC) 10 mg tablet Take 10 mg by mouth daily. mometasone (ELOCON) 0.1 % ointment Apply  to affected area daily. insulin NPH/insulin regular (HUMULIN 70/30 U-100 INSULIN) 100 unit/mL (70-30) injection 10 Units by SubCUTAneous route two (2) times a day. STOP taking these medications       trimethoprim-sulfamethoxazole (BACTRIM DS) 160-800 mg per tablet Comments:   Reason for Stopping:                 Disposition: home   Activity: As Tolerated  Diet: DIET DIABETIC CONSISTENT CARB Regular    No orders of the defined types were placed in this encounter.         Follow-up Information     Follow up With Specialties Details Why Contact Info    Kary Wolf MD Internal Medicine In 1 week  Darren Nassar Figueroa [facility]unez [address]  [phone]            Time spent in patient discharge planning and coordination 35 minutes.     Signed:  Wendy Craven MD

## 2019-01-11 NOTE — PROGRESS NOTES
Hospitalist  
Admit Date:  2019  8:28 PM  
Name:  Cyndie Salinas Age:  70 y.o. 
:  1940 MRN:  [id number] PCP:  Kat Aldrich MD 
Treatment Team: Attending Provider: Delia Weems MD; Utilization Review: Gayle Matthews RN As Per prior Notes: 
\" 
Subj:  
 
Patient is a very pleasant 69 y/o female with hx DM, HTN who presents to ED with acute onset profuse diarrhea starting today. No fever or chills. Some lower abdominal discomfort. She was initially seen by PCP last Thursday and diagnosed with a UTI and prescribed Bactrim DS. She only took 1 and a half doses because of severe stomach upset. The diarrhea started today. No suspect foods. No hx C diff. In ED she has stable vitals but WBC ct elevated to 18. 3. UA has >100 WBC. Sodium low at 118 and K 3.4. Stool for C difficile pending. She has experienced no seizure or confusion with the hyponatremia. Do not see that she is on any diuretics. Hospitalist service consulted to admit. Today, no diarrhea, no fever, feels better, sitting in a chair\" 01/10/2019- pt seen - notes insomnia- some anxiety over yearly utis Cultures still pending Objective:  
 
Patient Vitals for the past 24 hrs: 
 Temp Pulse Resp BP SpO2  
01/10/19 1600 97.2 °F (36.2 °C) 70 18 149/69 100 % 01/10/19 1200 97.9 °F (36.6 °C) 69 18 135/69 98 % 01/10/19 0800 96 °F (35.6 °C) 62 18 105/53 100 % 01/10/19 0336 97.4 °F (36.3 °C) 68 16 104/62 98 % Oxygen Therapy O2 Sat (%): 100 % (01/10/19 1600) Pulse via Oximetry: 70 beats per minute (19 1032) O2 Device: Room air (19 1216) No intake or output data in the 24 hours ending 01/10/19 1958 Physical Exam: 
General:    Well nourished. Alert. CV:   RRR. No murmur, rub, or gallop. Lungs:  CTAB. No wheezing, rhonchi, or rales. Abdomen: Soft, nontender, nondistended. Bowel sounds normal.  
Extremities: Warm and dry. No cyanosis or edema. Neurologic: grossly intact. Skin:     No rashes or jaundice. No wounds. Psych:  Normal mood and affect. Forgetful (daughter correct answers) I reviewed the labs, imaging, EKGs, telemetry, and other studies done this admission. Data Review:  
Recent Results (from the past 24 hour(s)) GLUCOSE, POC Collection Time: 01/09/19  9:37 PM  
Result Value Ref Range Glucose (POC) 165 (H) 65 - 100 mg/dL SODIUM Collection Time: 01/10/19  6:20 AM  
Result Value Ref Range Sodium 136 136 - 145 mmol/L  
GLUCOSE, POC Collection Time: 01/10/19  7:26 AM  
Result Value Ref Range Glucose (POC) 132 (H) 65 - 100 mg/dL GLUCOSE, POC Collection Time: 01/10/19 11:22 AM  
Result Value Ref Range Glucose (POC) 119 (H) 65 - 100 mg/dL GLUCOSE, POC Collection Time: 01/10/19  4:39 PM  
Result Value Ref Range Glucose (POC) 140 (H) 65 - 100 mg/dL Imaging Studies: CXR Results  (Last 48 hours) None CT Results  (Last 48 hours) None Assessment and Plan:  
 
Hospital Problems as of 1/10/2019 Never Reviewed Codes Class Noted - Resolved POA  
 UTI (urinary tract infection) ICD-10-CM: N39.0 ICD-9-CM: 599.0  1/9/2019 - Present Unknown Acute hyponatremia ICD-10-CM: E87.1 ICD-9-CM: 276.1  1/8/2019 - Present Yes * (Principal) Acute UTI (urinary tract infection) ICD-10-CM: N39.0 ICD-9-CM: 599.0  1/8/2019 - Present Yes Diarrhea ICD-10-CM: R19.7 ICD-9-CM: 787.91  1/8/2019 - Present Yes HTN (hypertension) ICD-10-CM: I10 
ICD-9-CM: 401.9  1/8/2019 - Present Yes DM [facility]diabetes mellitus) ([facility] ICD-10-CM: E11.9 ICD-9-CM: 250.00  1/8/2019 - Present Yes PLAN: 
Continue iv abx 
uc - growing GNRs- ID and sensitivity still pending Insomnia/ anxiety- ativan DispO; home once culture results are in  
 
Signed: 
Wendy Craven MD

## 2019-01-11 NOTE — PROGRESS NOTES
PM assessment complete. Alert, oriented x 4. Respirations even and unlabored, no distress noted. Denies needs or pain. Family at bedside. Aware to call should needs arise. Will monitor.

## 2019-01-11 NOTE — PROGRESS NOTES
Discharge instructions were reviewed with the patient and her son. Opportunity for questions given. Patient verbalized understanding of discharge and follow up instructions, as well as S/S to report to MD or return to ER for. PIV was removed. Prescriptions provided. Patient will D/C to home.

## 2019-01-23 ENCOUNTER — HOSPITAL ENCOUNTER (OUTPATIENT)
Age: 79
Setting detail: OBSERVATION
Discharge: HOME | End: 2019-01-26
Attending: EMERGENCY MEDICINE | Admitting: INTERNAL MEDICINE
Payer: MEDICARE

## 2019-01-23 DIAGNOSIS — N39.0 URINARY TRACT INFECTION WITHOUT HEMATURIA, SITE UNSPECIFIED: ICD-10-CM

## 2019-01-23 DIAGNOSIS — E87.1 HYPONATREMIA: Primary | ICD-10-CM

## 2019-01-23 LAB
ALBUMIN SERPL-MCNC: 4.1 G/DL (ref 3.2–4.6)
ALBUMIN/GLOB SERPL: 1.2 {RATIO} (ref 1.2–3.5)
ALP SERPL-CCNC: 75 U/L (ref 50–136)
ALT SERPL-CCNC: 32 U/L (ref 12–65)
ANION GAP SERPL CALC-SCNC: 13 MMOL/L (ref 7–16)
AST SERPL-CCNC: 23 U/L (ref 15–37)
BACTERIA URNS QL MICRO: 0 /HPF
BASOPHILS # BLD: 0 K/UL (ref 0–0.2)
BASOPHILS NFR BLD: 0 % (ref 0–2)
BILIRUB SERPL-MCNC: 0.9 MG/DL (ref 0.2–1.1)
BUN SERPL-MCNC: 10 MG/DL (ref 8–23)
CALCIUM SERPL-MCNC: 8.6 MG/DL (ref 8.3–10.4)
CASTS URNS QL MICRO: NORMAL /LPF
CHLORIDE SERPL-SCNC: 81 MMOL/L (ref 98–107)
CO2 SERPL-SCNC: 23 MMOL/L (ref 21–32)
CREAT SERPL-MCNC: 0.84 MG/DL (ref 0.6–1)
DIFFERENTIAL METHOD BLD: ABNORMAL
EOSINOPHIL # BLD: 0.1 K/UL (ref 0–0.8)
EOSINOPHIL NFR BLD: 1 % (ref 0.5–7.8)
EPI CELLS #/AREA URNS HPF: NORMAL /HPF
ERYTHROCYTE [DISTWIDTH] IN BLOOD BY AUTOMATED COUNT: 11.5 % (ref 11.9–14.6)
GLOBULIN SER CALC-MCNC: 3.3 G/DL (ref 2.3–3.5)
GLUCOSE SERPL-MCNC: 165 MG/DL (ref 65–100)
HCT VFR BLD AUTO: 36.2 % (ref 35.8–46.3)
HGB BLD-MCNC: 12.8 G/DL (ref 11.7–15.4)
IMM GRANULOCYTES # BLD AUTO: 0 K/UL (ref 0–0.5)
IMM GRANULOCYTES NFR BLD AUTO: 1 % (ref 0–5)
LACTATE BLD-SCNC: 2.16 MMOL/L (ref 0.5–1.9)
LYMPHOCYTES # BLD: 1.2 K/UL (ref 0.5–4.6)
LYMPHOCYTES NFR BLD: 15 % (ref 13–44)
MCH RBC QN AUTO: 31.2 PG (ref 26.1–32.9)
MCHC RBC AUTO-ENTMCNC: 35.4 G/DL (ref 31.4–35)
MCV RBC AUTO: 88.3 FL (ref 79.6–97.8)
MONOCYTES # BLD: 0.6 K/UL (ref 0.1–1.3)
MONOCYTES NFR BLD: 7 % (ref 4–12)
NEUTS SEG # BLD: 6.2 K/UL (ref 1.7–8.2)
NEUTS SEG NFR BLD: 77 % (ref 43–78)
NRBC # BLD: 0 K/UL (ref 0–0.2)
PLATELET # BLD AUTO: 278 K/UL (ref 150–450)
PMV BLD AUTO: 10.5 FL (ref 9.4–12.3)
POTASSIUM SERPL-SCNC: 3.2 MMOL/L (ref 3.5–5.1)
PROT SERPL-MCNC: 7.4 G/DL (ref 6.3–8.2)
RBC # BLD AUTO: 4.1 M/UL (ref 4.05–5.2)
RBC #/AREA URNS HPF: NORMAL /HPF
SODIUM SERPL-SCNC: 117 MMOL/L (ref 136–145)
WBC # BLD AUTO: 8.1 K/UL (ref 4.3–11.1)
WBC URNS QL MICRO: NORMAL /HPF

## 2019-01-23 PROCEDURE — 85025 COMPLETE CBC W/AUTO DIFF WBC: CPT

## 2019-01-23 PROCEDURE — 80053 COMPREHEN METABOLIC PANEL: CPT

## 2019-01-23 PROCEDURE — 81003 URINALYSIS AUTO W/O SCOPE: CPT | Performed by: EMERGENCY MEDICINE

## 2019-01-23 PROCEDURE — 96361 HYDRATE IV INFUSION ADD-ON: CPT

## 2019-01-23 PROCEDURE — 83605 ASSAY OF LACTIC ACID: CPT

## 2019-01-23 PROCEDURE — 74011250636 HC RX REV CODE- 250/636: Performed by: EMERGENCY MEDICINE

## 2019-01-23 PROCEDURE — 74011000258 HC RX REV CODE- 258: Performed by: EMERGENCY MEDICINE

## 2019-01-23 PROCEDURE — 96365 THER/PROPH/DIAG IV INF INIT: CPT

## 2019-01-23 PROCEDURE — 99284 EMERGENCY DEPT VISIT MOD MDM: CPT | Performed by: EMERGENCY MEDICINE

## 2019-01-23 PROCEDURE — 65270000029 HC RM PRIVATE

## 2019-01-23 PROCEDURE — 81015 MICROSCOPIC EXAM OF URINE: CPT

## 2019-01-23 PROCEDURE — 87086 URINE CULTURE/COLONY COUNT: CPT

## 2019-01-23 PROCEDURE — 65390000012 HC CONDITION CODE 44 OBSERVATION

## 2019-01-23 RX ADMIN — CEFTRIAXONE SODIUM 1 G: 1 INJECTION, POWDER, FOR SOLUTION INTRAMUSCULAR; INTRAVENOUS at 22:58

## 2019-01-23 RX ADMIN — SODIUM CHLORIDE 1000 ML: 900 INJECTION, SOLUTION INTRAVENOUS at 22:58

## 2019-01-24 ENCOUNTER — APPOINTMENT (OUTPATIENT)
Dept: ULTRASOUND IMAGING | Age: 79
End: 2019-01-24
Attending: INTERNAL MEDICINE
Payer: MEDICARE

## 2019-01-24 PROBLEM — G93.41 METABOLIC ENCEPHALOPATHY: Status: ACTIVE | Noted: 2019-01-24

## 2019-01-24 PROBLEM — G93.40 ENCEPHALOPATHY: Status: ACTIVE | Noted: 2019-01-23

## 2019-01-24 LAB
ANION GAP SERPL CALC-SCNC: 10 MMOL/L (ref 7–16)
APPEARANCE UR: CLEAR
BACTERIA URNS QL MICRO: 0 /HPF
BASOPHILS # BLD: 0 K/UL (ref 0–0.2)
BASOPHILS NFR BLD: 0 % (ref 0–2)
BILIRUB UR QL: NEGATIVE
BUN SERPL-MCNC: 7 MG/DL (ref 8–23)
CALCIUM SERPL-MCNC: 8.7 MG/DL (ref 8.3–10.4)
CHLORIDE SERPL-SCNC: 94 MMOL/L (ref 98–107)
CO2 SERPL-SCNC: 23 MMOL/L (ref 21–32)
COLOR UR: YELLOW
CORTIS AM PEAK SERPL-MCNC: 23.3 UG/DL (ref 7–25)
CREAT SERPL-MCNC: 0.73 MG/DL (ref 0.6–1)
DIFFERENTIAL METHOD BLD: ABNORMAL
EOSINOPHIL # BLD: 0 K/UL (ref 0–0.8)
EOSINOPHIL NFR BLD: 1 % (ref 0.5–7.8)
ERYTHROCYTE [DISTWIDTH] IN BLOOD BY AUTOMATED COUNT: 11.7 % (ref 11.9–14.6)
GLUCOSE BLD STRIP.AUTO-MCNC: 138 MG/DL (ref 65–100)
GLUCOSE BLD STRIP.AUTO-MCNC: 140 MG/DL (ref 65–100)
GLUCOSE BLD STRIP.AUTO-MCNC: 151 MG/DL (ref 65–100)
GLUCOSE BLD STRIP.AUTO-MCNC: 167 MG/DL (ref 65–100)
GLUCOSE BLD STRIP.AUTO-MCNC: 224 MG/DL (ref 65–100)
GLUCOSE SERPL-MCNC: 150 MG/DL (ref 65–100)
GLUCOSE UR STRIP.AUTO-MCNC: NEGATIVE MG/DL
HCT VFR BLD AUTO: 34.2 % (ref 35.8–46.3)
HGB BLD-MCNC: 12.3 G/DL (ref 11.7–15.4)
HGB UR QL STRIP: NEGATIVE
IMM GRANULOCYTES # BLD AUTO: 0 K/UL (ref 0–0.5)
IMM GRANULOCYTES NFR BLD AUTO: 0 % (ref 0–5)
KETONES UR QL STRIP.AUTO: NEGATIVE MG/DL
LACTATE SERPL-SCNC: 1.2 MMOL/L (ref 0.4–2)
LEUKOCYTE ESTERASE UR QL STRIP.AUTO: ABNORMAL
LYMPHOCYTES # BLD: 0.9 K/UL (ref 0.5–4.6)
LYMPHOCYTES NFR BLD: 19 % (ref 13–44)
MAGNESIUM SERPL-MCNC: 2.1 MG/DL (ref 1.8–2.4)
MAGNESIUM SERPL-MCNC: 2.2 MG/DL (ref 1.8–2.4)
MCH RBC QN AUTO: 31.5 PG (ref 26.1–32.9)
MCHC RBC AUTO-ENTMCNC: 36 G/DL (ref 31.4–35)
MCV RBC AUTO: 87.7 FL (ref 79.6–97.8)
MONOCYTES # BLD: 0.4 K/UL (ref 0.1–1.3)
MONOCYTES NFR BLD: 9 % (ref 4–12)
NEUTS SEG # BLD: 3.4 K/UL (ref 1.7–8.2)
NEUTS SEG NFR BLD: 71 % (ref 43–78)
NITRITE UR QL STRIP.AUTO: NEGATIVE
NRBC # BLD: 0 K/UL (ref 0–0.2)
OSMOLALITY UR: 158 MOSM/KG H2O (ref 50–1400)
PH UR STRIP: 7 [PH] (ref 5–9)
PLATELET # BLD AUTO: 253 K/UL (ref 150–450)
PMV BLD AUTO: 10.2 FL (ref 9.4–12.3)
POTASSIUM SERPL-SCNC: 3.3 MMOL/L (ref 3.5–5.1)
PROT UR STRIP-MCNC: NEGATIVE MG/DL
RBC # BLD AUTO: 3.9 M/UL (ref 4.05–5.2)
SODIUM SERPL-SCNC: 126 MMOL/L (ref 136–145)
SODIUM SERPL-SCNC: 127 MMOL/L (ref 136–145)
SODIUM SERPL-SCNC: 129 MMOL/L (ref 136–145)
SODIUM SERPL-SCNC: 131 MMOL/L (ref 136–145)
SODIUM UR-SCNC: 19 MMOL/L
SP GR UR REFRACTOMETRY: 1.01 (ref 1–1.02)
UROBILINOGEN UR QL STRIP.AUTO: 0.2 EU/DL (ref 0.2–1)
WBC # BLD AUTO: 4.8 K/UL (ref 4.3–11.1)

## 2019-01-24 PROCEDURE — 74011250637 HC RX REV CODE- 250/637: Performed by: INTERNAL MEDICINE

## 2019-01-24 PROCEDURE — 36415 COLL VENOUS BLD VENIPUNCTURE: CPT

## 2019-01-24 PROCEDURE — 74011000258 HC RX REV CODE- 258: Performed by: INTERNAL MEDICINE

## 2019-01-24 PROCEDURE — 96366 THER/PROPH/DIAG IV INF ADDON: CPT

## 2019-01-24 PROCEDURE — 77030020253 HC SOL INJ D545NS .05 DEX .45 SAL

## 2019-01-24 PROCEDURE — 83605 ASSAY OF LACTIC ACID: CPT

## 2019-01-24 PROCEDURE — 85025 COMPLETE CBC W/AUTO DIFF WBC: CPT

## 2019-01-24 PROCEDURE — 86580 TB INTRADERMAL TEST: CPT | Performed by: INTERNAL MEDICINE

## 2019-01-24 PROCEDURE — 96372 THER/PROPH/DIAG INJ SC/IM: CPT

## 2019-01-24 PROCEDURE — 82533 TOTAL CORTISOL: CPT

## 2019-01-24 PROCEDURE — 96361 HYDRATE IV INFUSION ADD-ON: CPT

## 2019-01-24 PROCEDURE — 81003 URINALYSIS AUTO W/O SCOPE: CPT

## 2019-01-24 PROCEDURE — 74011636637 HC RX REV CODE- 636/637: Performed by: INTERNAL MEDICINE

## 2019-01-24 PROCEDURE — 84300 ASSAY OF URINE SODIUM: CPT

## 2019-01-24 PROCEDURE — 83735 ASSAY OF MAGNESIUM: CPT

## 2019-01-24 PROCEDURE — 65390000012 HC CONDITION CODE 44 OBSERVATION

## 2019-01-24 PROCEDURE — 74011250636 HC RX REV CODE- 250/636: Performed by: INTERNAL MEDICINE

## 2019-01-24 PROCEDURE — 76775 US EXAM ABDO BACK WALL LIM: CPT

## 2019-01-24 PROCEDURE — 80048 BASIC METABOLIC PNL TOTAL CA: CPT

## 2019-01-24 PROCEDURE — 83935 ASSAY OF URINE OSMOLALITY: CPT

## 2019-01-24 PROCEDURE — 84295 ASSAY OF SERUM SODIUM: CPT

## 2019-01-24 PROCEDURE — 82962 GLUCOSE BLOOD TEST: CPT

## 2019-01-24 PROCEDURE — 74011000302 HC RX REV CODE- 302: Performed by: INTERNAL MEDICINE

## 2019-01-24 PROCEDURE — 99218 HC RM OBSERVATION: CPT

## 2019-01-24 RX ORDER — ACETAMINOPHEN 325 MG/1
650 TABLET ORAL
Status: DISCONTINUED | OUTPATIENT
Start: 2019-01-24 | End: 2019-01-26 | Stop reason: HOSPADM

## 2019-01-24 RX ORDER — ENOXAPARIN SODIUM 100 MG/ML
40 INJECTION SUBCUTANEOUS EVERY 24 HOURS
Status: DISCONTINUED | OUTPATIENT
Start: 2019-01-24 | End: 2019-01-26 | Stop reason: HOSPADM

## 2019-01-24 RX ORDER — ONDANSETRON 2 MG/ML
4 INJECTION INTRAMUSCULAR; INTRAVENOUS
Status: DISCONTINUED | OUTPATIENT
Start: 2019-01-24 | End: 2019-01-26 | Stop reason: HOSPADM

## 2019-01-24 RX ORDER — POTASSIUM CHLORIDE 20 MEQ/1
40 TABLET, EXTENDED RELEASE ORAL
Status: COMPLETED | OUTPATIENT
Start: 2019-01-24 | End: 2019-01-24

## 2019-01-24 RX ORDER — SODIUM CHLORIDE 0.9 % (FLUSH) 0.9 %
5-40 SYRINGE (ML) INJECTION AS NEEDED
Status: DISCONTINUED | OUTPATIENT
Start: 2019-01-24 | End: 2019-01-26 | Stop reason: HOSPADM

## 2019-01-24 RX ORDER — SODIUM CHLORIDE 9 MG/ML
75 INJECTION, SOLUTION INTRAVENOUS CONTINUOUS
Status: DISCONTINUED | OUTPATIENT
Start: 2019-01-24 | End: 2019-01-24

## 2019-01-24 RX ORDER — DEXTROSE MONOHYDRATE AND SODIUM CHLORIDE 5; .45 G/100ML; G/100ML
75 INJECTION, SOLUTION INTRAVENOUS CONTINUOUS
Status: DISCONTINUED | OUTPATIENT
Start: 2019-01-24 | End: 2019-01-25

## 2019-01-24 RX ORDER — SODIUM CHLORIDE 0.9 % (FLUSH) 0.9 %
5-40 SYRINGE (ML) INJECTION EVERY 8 HOURS
Status: DISCONTINUED | OUTPATIENT
Start: 2019-01-24 | End: 2019-01-26 | Stop reason: HOSPADM

## 2019-01-24 RX ORDER — ACETAMINOPHEN 325 MG/1
650 TABLET ORAL
Status: DISCONTINUED | OUTPATIENT
Start: 2019-01-24 | End: 2019-01-24 | Stop reason: SDUPTHER

## 2019-01-24 RX ORDER — INSULIN LISPRO 100 [IU]/ML
0-10 INJECTION, SOLUTION INTRAVENOUS; SUBCUTANEOUS
Status: DISCONTINUED | OUTPATIENT
Start: 2019-01-24 | End: 2019-01-25

## 2019-01-24 RX ORDER — PHENAZOPYRIDINE HYDROCHLORIDE 95 MG/1
95 TABLET ORAL 3 TIMES DAILY
Status: DISCONTINUED | OUTPATIENT
Start: 2019-01-24 | End: 2019-01-26 | Stop reason: HOSPADM

## 2019-01-24 RX ORDER — ACETAMINOPHEN 500 MG
1000 TABLET ORAL
Status: DISCONTINUED | OUTPATIENT
Start: 2019-01-24 | End: 2019-01-24

## 2019-01-24 RX ADMIN — DEXTROSE MONOHYDRATE AND SODIUM CHLORIDE 75 ML/HR: 5; .45 INJECTION, SOLUTION INTRAVENOUS at 09:25

## 2019-01-24 RX ADMIN — TUBERCULIN PURIFIED PROTEIN DERIVATIVE 5 UNITS: 5 INJECTION, SOLUTION INTRADERMAL at 04:11

## 2019-01-24 RX ADMIN — DEXTROSE MONOHYDRATE AND SODIUM CHLORIDE 75 ML/HR: 5; .45 INJECTION, SOLUTION INTRAVENOUS at 22:11

## 2019-01-24 RX ADMIN — INSULIN LISPRO 2 UNITS: 100 INJECTION, SOLUTION INTRAVENOUS; SUBCUTANEOUS at 22:07

## 2019-01-24 RX ADMIN — URINARY PAIN RELIEF 95 MG: 95 TABLET ORAL at 15:55

## 2019-01-24 RX ADMIN — POTASSIUM CHLORIDE 40 MEQ: 20 TABLET, EXTENDED RELEASE ORAL at 15:55

## 2019-01-24 RX ADMIN — ACETAMINOPHEN 650 MG: 325 TABLET ORAL at 02:10

## 2019-01-24 RX ADMIN — URINARY PAIN RELIEF 95 MG: 95 TABLET ORAL at 22:06

## 2019-01-24 RX ADMIN — INSULIN LISPRO 2 UNITS: 100 INJECTION, SOLUTION INTRAVENOUS; SUBCUTANEOUS at 18:11

## 2019-01-24 RX ADMIN — ACETAMINOPHEN 650 MG: 325 TABLET ORAL at 10:42

## 2019-01-24 RX ADMIN — ENOXAPARIN SODIUM 40 MG: 40 INJECTION SUBCUTANEOUS at 09:23

## 2019-01-24 RX ADMIN — CEFTRIAXONE SODIUM 1 G: 1 INJECTION, POWDER, FOR SOLUTION INTRAMUSCULAR; INTRAVENOUS at 22:06

## 2019-01-24 RX ADMIN — SODIUM CHLORIDE 75 ML/HR: 900 INJECTION, SOLUTION INTRAVENOUS at 04:00

## 2019-01-24 RX ADMIN — Medication 10 ML: at 14:25

## 2019-01-24 NOTE — PHYSICIAN ADVISORY
Letter of Determination:  Outpatient status receiving Observation Services This patient was originally hospitalized as Inpatient Status on 1/23/2019 for hyponatremia. At this time this patient does not appear to meet the medical necessity requirements to support an inpatient level of care. It is our recommendation that this patient's hospitalization status should be changed from INPATIENT to [place] receiving OBSERVATION services via Condition Code 44.  
  
This may change due to the medical condition of the patient and new clinical evidence as the patients care progreses. The final decision regarding the patient's hospitalization status depends on the attending physician's judgement. Yajaira Arrington MD, LACEY, Physician Advisor [address] [place] [address].

## 2019-01-24 NOTE — PROGRESS NOTES
Problem: Falls - Risk of 
Goal: *Absence of Falls Document Ebb [place] Fall Risk and appropriate interventions in the flowsheet. Outcome: Progressing Towards Goal 
Fall Risk Interventions: 
  
 
  
 
  
 
  
 
  
 
 
 
Problem: Pressure Injury - Risk of 
Goal: *Prevention of pressure injury Document Lico Scale and appropriate interventions in the flowsheet. Outcome: Progressing Towards Goal 
Pressure Injury Interventions: 
  
 
  
 
  
 
  
 
Nutrition Interventions: Discuss nutritional consult with provider

## 2019-01-24 NOTE — PROGRESS NOTES
Spoke to Ms. Maeve Taveras and her son in room 219 about Case Management and discharge planning. She lives in [facility] (independent living) in [place], [place], and one son is about 2 miles away, a daughter about 4 miles away, and son (in room now) in [place], [place]. They help her with getting groceries and such. But basically, she is independent with ADLs. No additional discharge needs identified. Care Management Interventions Plan discussed with Pt/Family/Caregiver:  Yes

## 2019-01-24 NOTE — PROGRESS NOTES
Hospitalist Progress Note Admit Date:  2019  9:58 PM  
Name:  Arvind Arita Age:  70 y.o. 
:  1940 MRN:  [id number] PCP:  Maxi Ortega MD 
Treatment Team: Attending Provider: Mukesh Dobbs MD; Utilization Review: Olivia Garcia RN; Care Manager: Katerine Marquez RN Subjective:  
 
Ms. Christi Schwartz is a 71 yo female with PMH of nephrolithiasis, UTI, HTN, hyponatremia, DM2 who is evaluated with confusion, hyponatremia (117) and recurrent UTI. She has been managed with IVF with sodium improvement. HCTZ stopped, TSH WNL. UA positive and she has been started on rocephin, cx pending. Renal US  Shows a probably nonobstructing left renal stone and mildly complex right renal interpolar cortical cyst with possible calcified right renal lower pole mass. Plans are for possible rehab upon discharge 19 feels better, son present, less confused, has dysuria, denies nausea or emesis Objective:  
 
Patient Vitals for the past 24 hrs: 
 Temp Pulse Resp BP SpO2  
19 1115 98.1 °F (36.7 °C) 71 16 135/52 99 % 19 0722 97.3 °F (36.3 °C) 65 18 111/73 96 % 19 0510 97.5 °F (36.4 °C) 70 18 122/45 100 % 19 0020 97.7 °F (36.5 °C) 73 16 126/75 99 % 19 2257  70   100 % 19 2159 97.8 °F (36.6 °C) 72 18 145/62 100 % Oxygen Therapy O2 Sat (%): 99 % (19 1115) Pulse via Oximetry: 70 beats per minute (19 2257) O2 Device: Room air (19) Intake/Output Summary (Last 24 hours) at 2019 1453 Last data filed at 2019 1429 Gross per 24 hour Intake 916 ml Output 500 ml Net 416 ml  
   
*Note that automatically entered I/Os may not be accurate; dependent on patient compliance with collection and accurate  by assistants. General:    Well nourished. Alert. No distress CV:   RRR. No murmur, rub, or gallop. No edema Lungs:   CTAB. No wheezing, rhonchi, or rales. Abdomen:   Soft, nontender, nondistended. Extremities: Warm and dry. Skin:     No rashes or jaundice. Neuro:  No gross focal deficits Data Review: 
I have reviewed all labs, meds, telemetry events, and studies from the last 24 hours: 
 
Recent Results (from the past 24 hour(s)) CBC WITH AUTOMATED DIFF Collection Time: 01/23/19 10:16 PM  
Result Value Ref Range WBC 8.1 4.3 - 11.1 K/uL  
 RBC 4.10 4.05 - 5.2 M/uL  
 HGB 12.8 11.7 - 15.4 g/dL HCT 36.2 35.8 - 46.3 % MCV 88.3 79.6 - 97.8 FL  
 MCH 31.2 26.1 - 32.9 PG  
 MCHC 35.4 (H) 31.4 - 35.0 g/dL  
 RDW 11.5 (L) 11.9 - 14.6 % PLATELET 139 337 - 028 K/uL MPV 10.5 9.4 - 12.3 FL ABSOLUTE NRBC 0.00 0.0 - 0.2 K/uL  
 DF AUTOMATED NEUTROPHILS 77 43 - 78 % LYMPHOCYTES 15 13 - 44 % MONOCYTES 7 4.0 - 12.0 % EOSINOPHILS 1 0.5 - 7.8 % BASOPHILS 0 0.0 - 2.0 % IMMATURE GRANULOCYTES 1 0.0 - 5.0 %  
 ABS. NEUTROPHILS 6.2 1.7 - 8.2 K/UL  
 ABS. LYMPHOCYTES 1.2 0.5 - 4.6 K/UL  
 ABS. MONOCYTES 0.6 0.1 - 1.3 K/UL  
 ABS. EOSINOPHILS 0.1 0.0 - 0.8 K/UL  
 ABS. BASOPHILS 0.0 0.0 - 0.2 K/UL  
 ABS. IMM. GRANS. 0.0 0.0 - 0.5 K/UL METABOLIC PANEL, COMPREHENSIVE Collection Time: 01/23/19 10:16 PM  
Result Value Ref Range Sodium 117 (LL) 136 - 145 mmol/L Potassium 3.2 (L) 3.5 - 5.1 mmol/L Chloride 81 (L) 98 - 107 mmol/L  
 CO2 23 21 - 32 mmol/L Anion gap 13 7 - 16 mmol/L Glucose 165 (H) 65 - 100 mg/dL BUN 10 8 - 23 MG/DL Creatinine 0.84 0.6 - 1.0 MG/DL  
 GFR est AA >60 >60 ml/min/1.73m2 GFR est non-AA >60 >60 ml/min/1.73m2 Calcium 8.6 8.3 - 10.4 MG/DL Bilirubin, total 0.9 0.2 - 1.1 MG/DL  
 ALT (SGPT) 32 12 - 65 U/L  
 AST (SGOT) 23 15 - 37 U/L Alk. phosphatase 75 50 - 136 U/L Protein, total 7.4 6.3 - 8.2 g/dL Albumin 4.1 3.2 - 4.6 g/dL Globulin 3.3 2.3 - 3.5 g/dL A-G Ratio 1.2 1.2 - 3.5 URINE MICROSCOPIC Collection Time: 01/23/19 10:50 PM  
Result Value Ref Range WBC 5-10 0 /hpf  
 RBC 0-3 0 /hpf Epithelial cells 0-3 0 /hpf Bacteria 0 0 /hpf Casts 0-3 0 /lpf CULTURE, URINE Collection Time: 01/23/19 10:50 PM  
Result Value Ref Range Special Requests: NO SPECIAL REQUESTS Culture result:     
  NO GROWTH AFTER SHORT PERIOD OF INCUBATION. FURTHER RESULTS TO FOLLOW AFTER OVERNIGHT INCUBATION. POC LACTIC ACID Collection Time: 01/23/19 10:55 PM  
Result Value Ref Range Lactic Acid (POC) 2.16 (H) 0.5 - 1.9 mmol/L  
GLUCOSE, POC Collection Time: 01/23/19 11:57 PM  
Result Value Ref Range Glucose (POC) 140 (H) 65 - 100 mg/dL MAGNESIUM Collection Time: 01/24/19  5:09 AM  
Result Value Ref Range Magnesium 2.1 1.8 - 2.4 mg/dL SODIUM Collection Time: 01/24/19  5:09 AM  
Result Value Ref Range Sodium 126 (L) 136 - 145 mmol/L  
GLUCOSE, POC Collection Time: 01/24/19  6:12 AM  
Result Value Ref Range Glucose (POC) 138 (H) 65 - 100 mg/dL METABOLIC PANEL, BASIC Collection Time: 01/24/19  9:37 AM  
Result Value Ref Range Sodium 127 (L) 136 - 145 mmol/L Potassium 3.3 (L) 3.5 - 5.1 mmol/L Chloride 94 (L) 98 - 107 mmol/L  
 CO2 23 21 - 32 mmol/L Anion gap 10 7 - 16 mmol/L Glucose 150 (H) 65 - 100 mg/dL BUN 7 (L) 8 - 23 MG/DL Creatinine 0.73 0.6 - 1.0 MG/DL  
 GFR est AA >60 >60 ml/min/1.73m2 GFR est non-AA >60 >60 ml/min/1.73m2 Calcium 8.7 8.3 - 10.4 MG/DL  
CBC WITH AUTOMATED DIFF Collection Time: 01/24/19  9:37 AM  
Result Value Ref Range WBC 4.8 4.3 - 11.1 K/uL  
 RBC 3.90 (L) 4.05 - 5.2 M/uL  
 HGB 12.3 11.7 - 15.4 g/dL HCT 34.2 (L) 35.8 - 46.3 % MCV 87.7 79.6 - 97.8 FL  
 MCH 31.5 26.1 - 32.9 PG  
 MCHC 36.0 (H) 31.4 - 35.0 g/dL  
 RDW 11.7 (L) 11.9 - 14.6 % PLATELET 212 504 - 650 K/uL MPV 10.2 9.4 - 12.3 FL ABSOLUTE NRBC 0.00 0.0 - 0.2 K/uL  
 DF AUTOMATED NEUTROPHILS 71 43 - 78 % LYMPHOCYTES 19 13 - 44 % MONOCYTES 9 4.0 - 12.0 % EOSINOPHILS 1 0.5 - 7.8 % BASOPHILS 0 0.0 - 2.0 % IMMATURE GRANULOCYTES 0 0.0 - 5.0 %  
 ABS. NEUTROPHILS 3.4 1.7 - 8.2 K/UL  
 ABS. LYMPHOCYTES 0.9 0.5 - 4.6 K/UL  
 ABS. MONOCYTES 0.4 0.1 - 1.3 K/UL  
 ABS. EOSINOPHILS 0.0 0.0 - 0.8 K/UL  
 ABS. BASOPHILS 0.0 0.0 - 0.2 K/UL  
 ABS. IMM. GRANS. 0.0 0.0 - 0.5 K/UL MAGNESIUM Collection Time: 01/24/19  9:37 AM  
Result Value Ref Range Magnesium 2.2 1.8 - 2.4 mg/dL CORTISOL, AM  
 Collection Time: 01/24/19  9:37 AM  
Result Value Ref Range Cortisol, a.m. 23.3 7 - 25 ug/dL LACTIC ACID Collection Time: 01/24/19  9:37 AM  
Result Value Ref Range Lactic acid 1.2 0.4 - 2.0 MMOL/L  
GLUCOSE, POC Collection Time: 01/24/19 10:54 AM  
Result Value Ref Range Glucose (POC) 224 (H) 65 - 100 mg/dL All Micro Results Procedure Component Value Units Date/Time CULTURE, URINE [[id number]] Collected:  01/23/19 2250 Order Status:  Completed Specimen:  Urine from Clean catch Updated:  01/24/19 8686 Special Requests: NO SPECIAL REQUESTS Culture result:    
  NO GROWTH AFTER SHORT PERIOD OF INCUBATION. FURTHER RESULTS TO FOLLOW AFTER OVERNIGHT INCUBATION. No results found for this visit on 01/23/19. Current Meds: 
Current Facility-Administered Medications Medication Dose Route Frequency  sodium chloride (NS) flush 5-40 mL  5-40 mL IntraVENous Q8H  
 sodium chloride (NS) flush 5-40 mL  5-40 mL IntraVENous PRN  
 acetaminophen (TYLENOL) tablet 650 mg  650 mg Oral Q4H PRN  
 ondansetron (ZOFRAN) injection 4 mg  4 mg IntraVENous Q4H PRN  
 cefTRIAXone (ROCEPHIN) 1 g in 0.9% sodium chloride (MBP/ADV) 50 mL  1 g IntraVENous Q24H  
 enoxaparin (LOVENOX) injection 40 mg  40 mg SubCUTAneous Q24H  
 tuberculin injection 5 Units  5 Units IntraDERMal ONCE  
 dextrose 5 % - 0.45% NaCl infusion  75 mL/hr IntraVENous CONTINUOUS Other Studies (last 24 hours): [place] 98 Result Date: 1/24/2019 Bilateral renal ultrasound History: recurrent uti, 78 years Female Comparison: None available Findings: The right kidney measures 10.9 CM in length. A mildly complex cystic lesion with internal septations is seen in the right renal interpolar cortex measuring 2.3 x 2.4 x 2.3 cm, without flow. Small simple appearing right renal interpolar cortical cyst measuring 4.2 x 3.9 x 4.5 cm, without flow. A hyperechoic mass with possible peripheral calcifications causing shadowing is seen in the right renal lower pole cortex measuring 1.9 x 1.7 x 1.9 cm, without flow. Small simple appearing left renal upper pole cortical cysts are seen measuring up to 2.7 x 3.1 cm, without flow. A small echogenic focus is noted in the left renal lower pole medullary level measuring 1.2 x 0.6 cm, likely representing a small nonobstructing calculus. Left kidney measures  11.1 CM. Partially full urinary bladder appears unremarkable. Impression: 1. No evidence of hydronephrosis. Probable small nonobstructing left renal calculus. 2.  Mildly complex right renal interpolar cortical cyst with a hyperechoic probably calcified right renal lower pole cortical mass. Elective CT of the abdomen with and without contrast renal mass protocol is recommended for further evaluation. Assessment and Plan:  
 
Hospital Problems as of 1/24/2019 Never Reviewed Codes Class Noted - Resolved POA Metabolic encephalopathy [facility]-27-NT: G93.41 
ICD-9-CM: 348.31  1/24/2019 - Present Yes Encephalopathy ICD-10-CM: G93.40 ICD-9-CM: 348.30  1/23/2019 - Present Yes  
   
 UTI (urinary tract infection) ICD-10-CM: N39.0 ICD-9-CM: 599.0  1/9/2019 - Present Unknown Hyponatremia ICD-10-CM: E87.1 ICD-9-CM: 276.1  1/8/2019 - Present Unknown Acute UTI (urinary tract infection) ICD-10-CM: N39.0 ICD-9-CM: 599.0  1/8/2019 - Present Yes HTN (hypertension) ICD-10-CM: I10 
ICD-9-CM: 401.9  1/8/2019 - Present Yes DM (diabetes mellitus) ([facility] [place] [facility] ICD-10-CM: E11.9 ICD-9-CM: 250.00  1/8/2019 - Present Yes Plan: 
· Hyponatremia: will adjust IVF to dextrose 1/2 NS due to rapid correction of hyponatremia, trend BMP, TSH stable, cortisol normal, had urine studies 1,2019 · UTI: continue rocephin and followup urine culture, add pyridium · Renal lesions: will need renal mass protocol CT  
· HTN: holding norvasc,  
· DM2: SSI, holding glucophage · Hypokalemia: replace and followup lab · Metabolic encephalopathy: resolved DC planning/Dispo:  pending Diet:  DIET DIABETIC WITH OPTIONS 
DVT ppx:  lovenox Signed: Andres Stone MD

## 2019-01-24 NOTE — PROGRESS NOTES
Virtual Utilization Review RN  has determined this patient meets the Condition Code 44 criteria under the Medicare guidelines for change from Inpatient to observation status. Admission status has been changed in the computer system. A copy of the Utilization Review RN documentation/EDVIN Account Notes Report and the MOON letter explaining the outpatient/observation services were given to patient/family representative with verbal explanation and verbal understanding was received about information given. Letter signed by patient with date and time. Signed copy placed in the patient's chart for scanning by HIS and copy left at bedside for patient information.  notified.

## 2019-01-24 NOTE — CDMP QUERY
Pt admitted with UTI, Encephalopathy documented. Please further specify type of Encephalopathy in the medical record ? Metabolic Encephalopathy ? Septic Encephalopathy ? Toxic Encephalopathy 
? Encephalopathy due to medications or drugs (please specify) ? Toxic Metabolic Encephalopathy 
? Other Encephalopathy 
? Other, please specify ? Clinically unable to determine The medical record reflects the following: 
   Risk Factors: UTI, Hyponatremia Clinical Indicators: confusion, can not give the hx Treatment: monitor patient

## 2019-01-24 NOTE — H&P
Hospitalist H&P Note Admit Date:  2019  9:58 PM  
Name:  Ayana Aviles Age:  70 y.o. 
:  1940 MRN:  [id number] PCP:  Radha Matos MD 
Treatment Team: Primary Nurse: Stephanie Mendieta CC: weakness and being wobbly on her feet today with abdominal pain- seen at  and found to UTI and low sodium so sent to the ER  
HPI:  
74yr old female with a pmhx sig for recent uti due to klebsiella pneumonia and hyponatremia. She was admitted to [address] from 2019 to 2019. She was discharged home on vantin. She has been unwell and went for follow up with her pcp and was started on bactrim for recurrence of her uti. Per the family the patient is on hctz and this was stopped and her pcp wrote her just a new prescription for the valsartan. I see no prior documentation of HCTZ. And will have the pharmacy pull old records with regard to prescriptions filled. Na is 117 today along with the uti so hospitalist asked to admit. She is confused- and cannot give the hx. Hx taken from family at the bedside. 10 systems reviewed and negative except as noted in HPI. Past Medical History:  
Diagnosis Date  Diabetes ([address]  Endocrine disease  Hypertension  Menopause Past Surgical History:  
Procedure Laterality Date  HX HYSTERECTOMY  HX OOPHORECTOMY Allergies Allergen Reactions  Codeine Other (comments)  Pcn [Penicillins] Unknown (comments) Social History Tobacco Use  Smoking status: Never Smoker  Smokeless tobacco: Never Used Substance Use Topics  Alcohol use: No  
  Frequency: Never Family History Problem Relation Age of Onset  Breast Cancer Neg Hx There is no immunization history on file for this patient. PTA Medications: 
Prior to Admission Medications Prescriptions Last Dose Informant Patient Reported? Taking?   
ALPRAZolam (XANAX) 0.25 mg tablet   Yes No  
 Sig: Take 0.25 mg by mouth three (3) times daily as needed for Anxiety. alendronate (FOSAMAX) 70 mg tablet   Yes No  
Sig: Take 70 mg by mouth. amLODIPine (NORVASC) 10 mg tablet   Yes No  
Sig: Take 10 mg by mouth daily. atorvastatin (LIPITOR) 40 mg tablet   Yes No  
Sig: Take 40 mg by mouth daily. buPROPion XL (WELLBUTRIN XL) 300 mg XL tablet   Yes No  
Sig: Take 300 mg by mouth every morning. cefpodoxime (VANTIN) 200 mg tablet   No No  
Sig: Take 1 Tab by mouth two (2) times a day. insulin NPH/insulin regular (HUMULIN 70/30 U-100 INSULIN) 100 unit/mL (70-30) injection   Yes No  
Sig: 10 Units by SubCUTAneous route two (2) times a day. metFORMIN (GLUCOPHAGE) 1,000 mg tablet   Yes No  
Sig: Take 1,000 mg by mouth daily. mometasone (ELOCON) 0.1 % ointment   Yes No  
Sig: Apply  to affected area daily. omeprazole (PRILOSEC) 40 mg capsule   Yes No  
Sig: Take 40 mg by mouth daily. valsartan (DIOVAN) 320 mg tablet   Yes No  
Sig: Take 320 mg by mouth daily. Facility-Administered Medications: None Objective:  
 
Patient Vitals for the past 24 hrs: 
 Temp Pulse Resp BP SpO2  
01/23/19 2159 97.8 °F (36.6 °C) 72 18 145/62 100 % Oxygen Therapy O2 Sat (%): 100 % (01/23/19 2159) O2 Device: Room air (01/23/19 2159) No intake or output data in the 24 hours ending 01/23/19 2259 Physical Exam: 
General:    Well nourished. Alert. Eyes:   Normal sclera. Extraocular movements intact. ENT:  Normocephalic, atraumatic. Moist mucous membranes CV:   RRR. No m/r/g. Peripheral pulses present. Capillary refill normal 
Lungs:  CTAB. No wheezing, rhonchi, or rales. Abdomen: Soft, nontender, nondistended. Bowel sounds normal.  
Extremities: Warm and dry. No cyanosis or edema. Neurologic: CN II-XII grossly intact. Sensation intact. Skin:     No rashes or jaundice. Normal coloration Psych:  Normal mood and affect. I reviewed the labs, imaging, EKGs, telemetry, and other studies done this admission. Data Review:  
Recent Results (from the past 24 hour(s)) CBC WITH AUTOMATED DIFF Collection Time: 01/23/19 10:16 PM  
Result Value Ref Range WBC 8.1 4.3 - 11.1 K/uL  
 RBC 4.10 4.05 - 5.2 M/uL  
 HGB 12.8 11.7 - 15.4 g/dL HCT 36.2 35.8 - 46.3 % MCV 88.3 79.6 - 97.8 FL  
 MCH 31.2 26.1 - 32.9 PG  
 MCHC 35.4 (H) 31.4 - 35.0 g/dL  
 RDW 11.5 (L) 11.9 - 14.6 % PLATELET 034 996 - 561 K/uL MPV 10.5 9.4 - 12.3 FL ABSOLUTE NRBC 0.00 0.0 - 0.2 K/uL  
 DF AUTOMATED NEUTROPHILS 77 43 - 78 % LYMPHOCYTES 15 13 - 44 % MONOCYTES 7 4.0 - 12.0 % EOSINOPHILS 1 0.5 - 7.8 % BASOPHILS 0 0.0 - 2.0 % IMMATURE GRANULOCYTES 1 0.0 - 5.0 %  
 ABS. NEUTROPHILS 6.2 1.7 - 8.2 K/UL  
 ABS. LYMPHOCYTES 1.2 0.5 - 4.6 K/UL  
 ABS. MONOCYTES 0.6 0.1 - 1.3 K/UL  
 ABS. EOSINOPHILS 0.1 0.0 - 0.8 K/UL  
 ABS. BASOPHILS 0.0 0.0 - 0.2 K/UL  
 ABS. IMM. GRANS. 0.0 0.0 - 0.5 K/UL All Micro Results Procedure Component Value Units Date/Time CULTURE, URINE [[id number]] Collected:  01/23/19 2250 Order Status:  Completed Specimen:  Urine from Clean catch Updated:  01/23/19 2253 Other Studies: No results found. Assessment and Plan:  
 
Hospital Problems as of 1/24/2019 Never Reviewed Codes Class Noted - Resolved POA Encephalopathy ICD-10-CM: G93.40 ICD-9-CM: 348.30  1/23/2019 - Present Yes  
   
 UTI (urinary tract infection) ICD-10-CM: N39.0 ICD-9-CM: 599.0  1/9/2019 - Present Hyponatremia ICD-10-CM: E87.1 ICD-9-CM: 276.1  1/8/2019 - Present Unknown Acute UTI (urinary tract infection) ICD-10-CM: N39.0 ICD-9-CM: 599.0  1/8/2019 - Present Yes HTN (hypertension) ICD-10-CM: I10 
ICD-9-CM: 401.9  1/8/2019 - Present Yes DM (diabetes mellitus) ([facility] ICD-10-CM: E11.9 ICD-9-CM: 250.00  1/8/2019 - Present Yes PLAN: 
 · UTI -re current - will continue ceftriaxone started in the er and f/up cultures; renal us- if she proves to have a true recurrence on infection will likely need urology evaluation as an in/out patient · Hyponatremia- unclear etiology - send labs- was present on last admit but resolved- family states pt was taking hctz but will have to clarify as this is not documented- asked them to bring in the bottles of her home meds- also have pharmacy do a check of filled outpatient scripts in the am; monitor na · Encephalopathy- likely secondary to the hyponatremia- will monitor · Weakness- likely secondary to hyponatremia · Abdominal pain resolved · Dm- monitor bg and cover with insulin · Further workup and mgt based on her clinical course DVT ppx:  Heparin Anticipated DC needs:  None at this time Code status:  Full Estimated LOS:  Greater than 2 midnights Risk:  high Signed: 
Beto Ojeda MD

## 2019-01-24 NOTE — ED TRIAGE NOTES
C/o urinary pain. Onset approx 2 weeks pta, hospitalized at [facility] 1/8 for same. States never resolved. Reports 1 episode of vomiting tonight. Denies fever. Seen at [facility] p[facility], sent to ed for hyponatremia and uti.

## 2019-01-24 NOTE — PROGRESS NOTES
01/24/19 0211 Pain 1 Pain Scale 1 Numeric (0 - 10) Pain Intensity 1 3 Pain Location 1 Abdomen Pain Description 1 Cramping Pain Intervention(s) 1 Medication (see MAR)

## 2019-01-24 NOTE — ED PROVIDER NOTES
Patient is a 72-year-old female with a history of hypertension and diabetes who is referred to the emergency department today from [facility] urgent care for admission and treatment of a UTI and hyponatremia. She was admitted to the [facility] for those same tube problems 2 weeks ago. Family states that she did well after discharge however she went back to taking her previous medications at home when she was supposed to stop her hydrochlorothiazide. They fear she may have been taking it again. One week ago she started having dysuria and was started on some oral antibiotics by her primary care physician. Over the past several days she's had some increased confusion [facility] and a sodium of 117 she was directed here for admission to the hospital. 
 
 
The history is provided by the patient and a relative. Urinary Pain This is a new problem. The current episode started more than 1 week ago. The problem has been gradually worsening. The quality of the pain is described as burning. The pain is mild. There has been no fever. Associated symptoms include frequency. Pertinent negatives include no chills, no nausea, no vomiting, no urgency, no abdominal pain and no back pain. The patient is not pregnant. She has tried antibiotics for the symptoms. Her past medical history does not include kidney stones. Past Medical History:  
Diagnosis Date  Diabetes ([address]  Endocrine disease  Hypertension  Menopause Past Surgical History:  
Procedure Laterality Date  HX HYSTERECTOMY  HX OOPHORECTOMY Family History:  
Problem Relation Age of Onset  Breast Cancer Neg Hx Social History Socioeconomic History  Marital status:  Spouse name: Not on file  Number of children: Not on file  Years of education: Not on file  Highest education level: Not on file Social Needs  Financial resource strain: Not on file  Food insecurity - worry: Not on file  Food insecurity - inability: Not on file  Transportation needs - medical: Not on file  Transportation needs - non-medical: Not on file Occupational History  Not on file Tobacco Use  Smoking status: Never Smoker  Smokeless tobacco: Never Used Substance and Sexual Activity  Alcohol use: No  
  Frequency: Never  Drug use: No  
 Sexual activity: Not Currently Other Topics Concern  Not on file Social History Narrative  Not on file ALLERGIES: Codeine and Pcn [penicillins] Review of Systems Constitutional: Negative for chills, fatigue and fever. HENT: Negative for congestion, rhinorrhea and sore throat. Eyes: Negative for pain, discharge and visual disturbance. Respiratory: Negative for cough and shortness of breath. Cardiovascular: Negative for chest pain and palpitations. Gastrointestinal: Negative for abdominal pain, diarrhea, nausea and vomiting. Endocrine: Negative for polydipsia and polyuria. Genitourinary: Positive for dysuria and frequency. Negative for urgency. Musculoskeletal: Negative for back pain and neck pain. Skin: Negative for rash. Neurological: Negative for seizures, syncope and weakness. Hematological: Negative. Vitals:  
 01/23/19 2159 BP: 145/62 Pulse: 72 Resp: 18 Temp: 97.8 °F (36.6 °C) SpO2: 100% Weight: 56.7 kg (125 lb) Height: 5' 4\" (1.626 m) Physical Exam  
Constitutional: She is oriented to person, place, and time. She appears well-developed and well-nourished. HENT:  
Head: Normocephalic and atraumatic. Eyes: Conjunctivae and EOM are normal. Pupils are equal, round, and reactive to light. Neck: Normal range of motion. Neck supple. Cardiovascular: Normal rate, regular rhythm and intact distal pulses.   
Pulmonary/Chest: Effort normal and breath sounds normal.  
 Abdominal: Soft. There is no tenderness. There is no rebound and no guarding. Musculoskeletal: Normal range of motion. She exhibits no edema or deformity. Lymphadenopathy:  
  She has no cervical adenopathy. Neurological: She is alert and oriented to person, place, and time. She has normal strength. No cranial nerve deficit or sensory deficit. GCS eye subscore is 4. GCS verbal subscore is 5. GCS motor subscore is 6. Slightly slow to answer questions and family statesshe is not quite her normal baseline. Skin: Skin is warm and dry. No rash noted. Nursing note and vitals reviewed. MDM Number of Diagnoses or Management Options Diagnosis management comments: [facility] have infected urine and a low sodium at 117. I also reviewed her admission to our facility 2 weeks ago for the identical complaints. Her urine culture was sensitive to Rocephin and she did normalize her sodium with IV fluids. We will recheck her labs, urinalysis, and urine culture I have ordered some normal saline and a gram of Rocephin. I have consulted with Dr. Staci Lazo the [facility]ist [facility] She is familiar with this patient from her admission 2 weeks ago. Voice dictation software was used during the making of this note. This software is not perfect and grammatical and other typographical errors may be present. This note has been proofread, but may still contain errors. Lux Parsons MD; 1/23/2019 @10:36 PM  
=================================================================== Amount and/or Complexity of Data Reviewed Clinical lab tests: ordered and reviewed Review and summarize past medical records: yes Discuss the patient with other providers: yes Independent visualization of images, tracings, or specimens: yes Risk of Complications, Morbidity, and/or Mortality Presenting problems: moderate Diagnostic procedures: low Management options: low Patient Progress Patient progress: stable Procedures

## 2019-01-24 NOTE — PROGRESS NOTES
01/24/19 0020 Dual Skin Pressure Injury Assessment Dual Skin Pressure Injury Assessment WDL Second Care Provider (Based on [address] [place] [address]) Larissa RN Skin Integumentary Skin Integumentary (WDL) WDL Pressure  Injury Documentation No Pressure Injury Noted-Pressure Ulcer Prevention Initiated No skin issues, scar from gall bladder

## 2019-01-24 NOTE — ED NOTES
TRANSFER - OUT REPORT: 
 
Verbal report given to Harris, RN(name) on Minerva Haywood  being transferred to 219(unit) for routine progression of care Report consisted of patients Situation, Background, Assessment and  
Recommendations(SBAR). Information from the following report(s) ED Summary was reviewed with the receiving nurse. Lines:  
Peripheral IV 01/23/19 Left Antecubital (Active) Site Assessment Clean, dry, & intact 1/23/2019 10:16 PM  
Phlebitis Assessment 0 1/23/2019 10:16 PM  
Infiltration Assessment 0 1/23/2019 10:16 PM  
Dressing Status Clean, dry, & intact 1/23/2019 10:16 PM  
Dressing Type Transparent 1/23/2019 10:16 PM  
Hub Color/Line Status Red 1/23/2019 10:16 PM  
  
 
Opportunity for questions and clarification was provided. Patient transported with: 
 [employer]

## 2019-01-25 ENCOUNTER — APPOINTMENT (OUTPATIENT)
Dept: CT IMAGING | Age: 79
End: 2019-01-25
Attending: INTERNAL MEDICINE
Payer: MEDICARE

## 2019-01-25 LAB
ANION GAP SERPL CALC-SCNC: 13 MMOL/L (ref 7–16)
BASOPHILS # BLD: 0 K/UL (ref 0–0.2)
BASOPHILS NFR BLD: 0 % (ref 0–2)
BUN SERPL-MCNC: 9 MG/DL (ref 8–23)
CALCIUM SERPL-MCNC: 8.9 MG/DL (ref 8.3–10.4)
CHLORIDE SERPL-SCNC: 102 MMOL/L (ref 98–107)
CO2 SERPL-SCNC: 18 MMOL/L (ref 21–32)
CREAT SERPL-MCNC: 0.73 MG/DL (ref 0.6–1)
DIFFERENTIAL METHOD BLD: NORMAL
EOSINOPHIL # BLD: 0.1 K/UL (ref 0–0.8)
EOSINOPHIL NFR BLD: 1 % (ref 0.5–7.8)
ERYTHROCYTE [DISTWIDTH] IN BLOOD BY AUTOMATED COUNT: 12 % (ref 11.9–14.6)
GLUCOSE BLD STRIP.AUTO-MCNC: 143 MG/DL (ref 65–100)
GLUCOSE BLD STRIP.AUTO-MCNC: 147 MG/DL (ref 65–100)
GLUCOSE BLD STRIP.AUTO-MCNC: 187 MG/DL (ref 65–100)
GLUCOSE BLD STRIP.AUTO-MCNC: 188 MG/DL (ref 65–100)
GLUCOSE BLD STRIP.AUTO-MCNC: 209 MG/DL (ref 65–100)
GLUCOSE SERPL-MCNC: 137 MG/DL (ref 65–100)
HCT VFR BLD AUTO: 39 % (ref 35.8–46.3)
HGB BLD-MCNC: 13 G/DL (ref 11.7–15.4)
IMM GRANULOCYTES # BLD AUTO: 0 K/UL (ref 0–0.5)
IMM GRANULOCYTES NFR BLD AUTO: 0 % (ref 0–5)
LYMPHOCYTES # BLD: 1.3 K/UL (ref 0.5–4.6)
LYMPHOCYTES NFR BLD: 25 % (ref 13–44)
MAGNESIUM SERPL-MCNC: 2.4 MG/DL (ref 1.8–2.4)
MCH RBC QN AUTO: 31.3 PG (ref 26.1–32.9)
MCHC RBC AUTO-ENTMCNC: 33.3 G/DL (ref 31.4–35)
MCV RBC AUTO: 93.8 FL (ref 79.6–97.8)
MM INDURATION POC: NORMAL MM (ref 0–5)
MONOCYTES # BLD: 0.5 K/UL (ref 0.1–1.3)
MONOCYTES NFR BLD: 10 % (ref 4–12)
NEUTS SEG # BLD: 3.3 K/UL (ref 1.7–8.2)
NEUTS SEG NFR BLD: 62 % (ref 43–78)
NRBC # BLD: 0 K/UL (ref 0–0.2)
PLATELET # BLD AUTO: 233 K/UL (ref 150–450)
PMV BLD AUTO: 11.2 FL (ref 9.4–12.3)
POTASSIUM SERPL-SCNC: 3.6 MMOL/L (ref 3.5–5.1)
PPD POC: NORMAL NEGATIVE
RBC # BLD AUTO: 4.16 M/UL (ref 4.05–5.2)
SODIUM SERPL-SCNC: 133 MMOL/L (ref 136–145)
WBC # BLD AUTO: 5.2 K/UL (ref 4.3–11.1)

## 2019-01-25 PROCEDURE — 74011250637 HC RX REV CODE- 250/637: Performed by: INTERNAL MEDICINE

## 2019-01-25 PROCEDURE — 74170 CT ABD WO CNTRST FLWD CNTRST: CPT

## 2019-01-25 PROCEDURE — 97165 OT EVAL LOW COMPLEX 30 MIN: CPT

## 2019-01-25 PROCEDURE — 96361 HYDRATE IV INFUSION ADD-ON: CPT

## 2019-01-25 PROCEDURE — 36415 COLL VENOUS BLD VENIPUNCTURE: CPT

## 2019-01-25 PROCEDURE — 82962 GLUCOSE BLOOD TEST: CPT

## 2019-01-25 PROCEDURE — 96372 THER/PROPH/DIAG INJ SC/IM: CPT

## 2019-01-25 PROCEDURE — 85025 COMPLETE CBC W/AUTO DIFF WBC: CPT

## 2019-01-25 PROCEDURE — 83735 ASSAY OF MAGNESIUM: CPT

## 2019-01-25 PROCEDURE — 74011636320 HC RX REV CODE- 636/320: Performed by: INTERNAL MEDICINE

## 2019-01-25 PROCEDURE — 74011250636 HC RX REV CODE- 250/636: Performed by: INTERNAL MEDICINE

## 2019-01-25 PROCEDURE — 96366 THER/PROPH/DIAG IV INF ADDON: CPT

## 2019-01-25 PROCEDURE — 74011000258 HC RX REV CODE- 258: Performed by: INTERNAL MEDICINE

## 2019-01-25 PROCEDURE — 97161 PT EVAL LOW COMPLEX 20 MIN: CPT

## 2019-01-25 PROCEDURE — 80048 BASIC METABOLIC PNL TOTAL CA: CPT

## 2019-01-25 PROCEDURE — 74011636637 HC RX REV CODE- 636/637: Performed by: INTERNAL MEDICINE

## 2019-01-25 PROCEDURE — 99218 HC RM OBSERVATION: CPT

## 2019-01-25 RX ORDER — SODIUM CHLORIDE 0.9 % (FLUSH) 0.9 %
10 SYRINGE (ML) INJECTION
Status: COMPLETED | OUTPATIENT
Start: 2019-01-25 | End: 2019-01-25

## 2019-01-25 RX ADMIN — INSULIN HUMAN 4 UNITS: 100 INJECTION, SOLUTION PARENTERAL at 12:04

## 2019-01-25 RX ADMIN — URINARY PAIN RELIEF 95 MG: 95 TABLET ORAL at 12:05

## 2019-01-25 RX ADMIN — ENOXAPARIN SODIUM 40 MG: 40 INJECTION SUBCUTANEOUS at 07:57

## 2019-01-25 RX ADMIN — URINARY PAIN RELIEF 95 MG: 95 TABLET ORAL at 22:57

## 2019-01-25 RX ADMIN — INSULIN HUMAN 7 UNITS: 100 INJECTION, SUSPENSION SUBCUTANEOUS at 12:02

## 2019-01-25 RX ADMIN — IOPAMIDOL 100 ML: 755 INJECTION, SOLUTION INTRAVENOUS at 10:01

## 2019-01-25 RX ADMIN — SODIUM CHLORIDE 100 ML: 900 INJECTION, SOLUTION INTRAVENOUS at 10:01

## 2019-01-25 RX ADMIN — INSULIN LISPRO 8 UNITS: 100 INJECTION, SOLUTION INTRAVENOUS; SUBCUTANEOUS at 07:54

## 2019-01-25 RX ADMIN — URINARY PAIN RELIEF 95 MG: 95 TABLET ORAL at 17:19

## 2019-01-25 RX ADMIN — INSULIN HUMAN 7 UNITS: 100 INJECTION, SUSPENSION SUBCUTANEOUS at 17:23

## 2019-01-25 RX ADMIN — INSULIN HUMAN 3 UNITS: 100 INJECTION, SOLUTION PARENTERAL at 17:23

## 2019-01-25 RX ADMIN — CEFTRIAXONE SODIUM 1 G: 1 INJECTION, POWDER, FOR SOLUTION INTRAMUSCULAR; INTRAVENOUS at 22:55

## 2019-01-25 RX ADMIN — Medication 5 ML: at 15:16

## 2019-01-25 RX ADMIN — Medication 10 ML: at 22:58

## 2019-01-25 RX ADMIN — Medication 10 ML: at 10:01

## 2019-01-25 RX ADMIN — INSULIN HUMAN 3 UNITS: 100 INJECTION, SOLUTION PARENTERAL at 12:03

## 2019-01-25 NOTE — PROGRESS NOTES
Hospitalist Progress Note Admit Date:  2019  9:58 PM  
Name:  Flaco Arciniega Age:  70 y.o. 
:  1940 MRN:  [id number] PCP:  Kaleb Cao MD 
Treatment Team: Attending Provider: Reena Benitez MD; Utilization Review: Binu Tamayo RN; Care Manager: Joy Parekh RN Subjective:  
 
Ms. Felicia Allen is a 71 yo female with PMH of nephrolithiasis, UTI, HTN, hyponatremia, DM2, menieres disease, who is evaluated with confusion, hyponatremia (117) and recurrent UTI. She has been managed with IVF with sodium improvement. HCTZ stopped, TSH WNL. UA positive and she has been started on rocephin, cx pending. Renal US  Shows a probably nonobstructing left renal stone and mildly complex right renal interpolar cortical cyst with possible calcified right renal lower pole mass. Plans are for home upon discharge 19 son present and feels she is still confused at times with a slow memory decline, was worked up by PCP with cognitive testing, patient eating breakfast without nausea, no dyspnea or edema, has chronic hearing loss and tinnitus , dysuria improved Objective:  
 
Patient Vitals for the past 24 hrs: 
 Temp Pulse Resp BP SpO2  
19 0800 97.4 °F (36.3 °C) 63 18 130/72 100 % 19 0400 97.7 °F (36.5 °C) 65 18 130/48 99 % 19 2337 97.7 °F (36.5 °C) 72 18 124/42 99 % 19 2050 98.9 °F (37.2 °C) 74 18 136/46 97 % 19 1511 97.8 °F (36.6 °C) 71 17 129/60 100 % 19 1115 98.1 °F (36.7 °C) 71 16 135/52 99 % Oxygen Therapy O2 Sat (%): 100 % (19 0800) Pulse via Oximetry: 70 beats per minute (19) O2 Device: Room air (19) Intake/Output Summary (Last 24 hours) at 2019 2325 Last data filed at 2019 0800 Gross per 24 hour Intake 2497 ml Output 900 ml Net 1597 ml *Note that automatically entered I/Os may not be accurate; dependent on patient compliance with collection and accurate  by assistants. General:    Well nourished. Alert. No distress CV:   RRR. No murmur, rub, or gallop. No edema Lungs:   CTAB. No wheezing, rhonchi, or rales. Abdomen:   Soft, nontender, nondistended. Present BS Extremities: Warm and dry. Skin:     No rashes or jaundice. Neuro:  No gross focal deficits Data Review: 
I have reviewed all labs, meds, telemetry events, and studies from the last 24 hours: 
 
Recent Results (from the past 24 hour(s)) GLUCOSE, POC Collection Time: 01/24/19 10:54 AM  
Result Value Ref Range Glucose (POC) 224 (H) 65 - 100 mg/dL GLUCOSE, POC Collection Time: 01/24/19  4:14 PM  
Result Value Ref Range Glucose (POC) 151 (H) 65 - 100 mg/dL SODIUM Collection Time: 01/24/19  4:17 PM  
Result Value Ref Range Sodium 129 (L) 136 - 145 mmol/L  
URINALYSIS W/ RFLX MICROSCOPIC Collection Time: 01/24/19  6:15 PM  
Result Value Ref Range Color YELLOW Appearance CLEAR Specific gravity 1.007 1.001 - 1.023    
 pH (UA) 7.0 5.0 - 9.0 Protein NEGATIVE  NEG mg/dL Glucose NEGATIVE  NEG mg/dL Ketone NEGATIVE  NEG mg/dL Bilirubin NEGATIVE  NEG Blood NEGATIVE  NEG Urobilinogen 0.2 0.2 - 1.0 EU/dL Nitrites NEGATIVE  NEG Leukocyte Esterase TRACE (A) NEG Bacteria 0 0 /hpf  
OSMOLALITY, UR Collection Time: 01/24/19  6:15 PM  
Result Value Ref Range Osmolality,urine 158 50 - 1,400 MOSM/kg H2O  
SODIUM, UR, RANDOM Collection Time: 01/24/19  6:15 PM  
Result Value Ref Range Sodium,urine random 19 MMOL/L  
GLUCOSE, POC Collection Time: 01/24/19  9:37 PM  
Result Value Ref Range Glucose (POC) 167 (H) 65 - 100 mg/dL SODIUM Collection Time: 01/24/19 10:05 PM  
Result Value Ref Range Sodium 131 (L) 136 - 145 mmol/L  
PLEASE READ & DOCUMENT PPD TEST IN 24 HRS Collection Time: 01/25/19  4:11 AM  
Result Value Ref Range PPD  Negative mm Induration  mm METABOLIC PANEL, BASIC Collection Time: 01/25/19  4:35 AM  
Result Value Ref Range Sodium 133 (L) 136 - 145 mmol/L Potassium 3.6 3.5 - 5.1 mmol/L Chloride 102 98 - 107 mmol/L  
 CO2 18 (L) 21 - 32 mmol/L Anion gap 13 7 - 16 mmol/L Glucose 137 (H) 65 - 100 mg/dL BUN 9 8 - 23 MG/DL Creatinine 0.73 0.6 - 1.0 MG/DL  
 GFR est AA >60 >60 ml/min/1.73m2 GFR est non-AA >60 >60 ml/min/1.73m2 Calcium 8.9 8.3 - 10.4 MG/DL  
CBC WITH AUTOMATED DIFF Collection Time: 01/25/19  4:35 AM  
Result Value Ref Range WBC 5.2 4.3 - 11.1 K/uL  
 RBC 4.16 4.05 - 5.2 M/uL  
 HGB 13.0 11.7 - 15.4 g/dL HCT 39.0 35.8 - 46.3 % MCV 93.8 79.6 - 97.8 FL  
 MCH 31.3 26.1 - 32.9 PG  
 MCHC 33.3 31.4 - 35.0 g/dL  
 RDW 12.0 11.9 - 14.6 % PLATELET 469 804 - 750 K/uL MPV 11.2 9.4 - 12.3 FL ABSOLUTE NRBC 0.00 0.0 - 0.2 K/uL  
 DF AUTOMATED NEUTROPHILS 62 43 - 78 % LYMPHOCYTES 25 13 - 44 % MONOCYTES 10 4.0 - 12.0 % EOSINOPHILS 1 0.5 - 7.8 % BASOPHILS 0 0.0 - 2.0 % IMMATURE GRANULOCYTES 0 0.0 - 5.0 %  
 ABS. NEUTROPHILS 3.3 1.7 - 8.2 K/UL  
 ABS. LYMPHOCYTES 1.3 0.5 - 4.6 K/UL  
 ABS. MONOCYTES 0.5 0.1 - 1.3 K/UL  
 ABS. EOSINOPHILS 0.1 0.0 - 0.8 K/UL  
 ABS. BASOPHILS 0.0 0.0 - 0.2 K/UL  
 ABS. IMM. GRANS. 0.0 0.0 - 0.5 K/UL MAGNESIUM Collection Time: 01/25/19  4:35 AM  
Result Value Ref Range Magnesium 2.4 1.8 - 2.4 mg/dL GLUCOSE, POC Collection Time: 01/25/19  6:27 AM  
Result Value Ref Range Glucose (POC) 187 (H) 65 - 100 mg/dL All Micro Results Procedure Component Value Units Date/Time CULTURE, URINE [[id number]] Collected:  01/23/19 2250 Order Status:  Completed Specimen:  Urine from Clean catch Updated:  01/25/19 6839 Special Requests: NO SPECIAL REQUESTS Culture result: NO GROWTH 1 DAY No results found for this visit on 01/23/19. Current Meds: 
Current Facility-Administered Medications Medication Dose Route Frequency  sodium chloride 0.9 % bolus infusion 100 mL  100 mL IntraVENous RAD ONCE  
 saline peripheral flush soln 10 mL  10 mL InterCATHeter RAD ONCE  
 iopamidol (ISOVUE-370) 76 % injection 100 mL  100 mL IntraVENous RAD ONCE  
 sodium chloride (NS) flush 5-40 mL  5-40 mL IntraVENous Q8H  
 sodium chloride (NS) flush 5-40 mL  5-40 mL IntraVENous PRN  
 acetaminophen (TYLENOL) tablet 650 mg  650 mg Oral Q4H PRN  
 ondansetron (ZOFRAN) injection 4 mg  4 mg IntraVENous Q4H PRN  
 cefTRIAXone (ROCEPHIN) 1 g in 0.9% sodium chloride (MBP/ADV) 50 mL  1 g IntraVENous Q24H  
 enoxaparin (LOVENOX) injection 40 mg  40 mg SubCUTAneous Q24H  phenazopyridine (PYRIDIUM) tab 95 mg  95 mg Oral TID  insulin lispro (HUMALOG) injection 0-10 Units  0-10 Units SubCUTAneous AC&HS Other Studies (last 24 hours): No results found. Assessment and Plan:  
 
Hospital Problems as of 1/25/2019 Never Reviewed Codes Class Noted - Resolved POA Metabolic encephalopathy TEP-65-BW: G93.41 
ICD-9-CM: 348.31  1/24/2019 - Present Yes Encephalopathy ICD-10-CM: G93.40 ICD-9-CM: 348.30  1/23/2019 - Present Yes  
   
 UTI (urinary tract infection) ICD-10-CM: N39.0 ICD-9-CM: 599.0  1/9/2019 - Present Unknown Hyponatremia ICD-10-CM: E87.1 ICD-9-CM: 276.1  1/8/2019 - Present Unknown Acute UTI (urinary tract infection) ICD-10-CM: N39.0 ICD-9-CM: 599.0  1/8/2019 - Present Yes HTN (hypertension) ICD-10-CM: I10 
ICD-9-CM: 401.9  1/8/2019 - Present Yes DM (diabetes mellitus) ([facility].) ICD-10-CM: E11.9 ICD-9-CM: 250.00  1/8/2019 - Present Yes Plan: 
· Hyponatremia: stop IVF, trend daily BMP, TSH stable, cortisol normal, had urine studies 1,2019- stopped HCTZ 
· UTI: continue rocephin and followup urine culture, added pyridium · Renal lesions: proceed with renal mass protocol CT , outpatient urology consult · HTN: holding home meds · DM2: SSI, restart basal insulin · Hypokalemia: replace and followup lab · Metabolic encephalopathy: resolving, son feels that she is still very confused, metabolic workup as above, will defer cognitive testing/ treatment to PCP 
 
DC planning/Dispo:  Pending to home Diet:  DIET DIABETIC WITH OPTIONS 
DVT ppx:  lovenox Signed: Christos Velarde MD

## 2019-01-25 NOTE — PROGRESS NOTES
Pt AOx4, but is very repetitive in conversation and does not remember family visiting for several hours last night. Has needed reoriented to place and situation several times this morning.

## 2019-01-25 NOTE — PROGRESS NOTES
Problem: Self Care Deficits Care Plan (Adult) Goal: *Acute Goals and Plan of Care (Insert Text) OCCUPATIONAL THERAPY: Initial Assessment, Discharge and PM 1/25/2019OBSERVATION:   
Payor: [address] [place],9D / Plan: [address] / Product Type: Lot78 Care Medicare /  
  
NAME/AGE/GENDER: Shaista Sandoval is a 70 y.o. female PRIMARY DIAGNOSIS:  UTI (urinary tract infection) UTI (urinary tract infection) <principal problem not specified> <principal problem not specified> 
 
  
ICD-10: Treatment Diagnosis:  
 · Generalized Muscle Weakness (M62.81) · Dizziness and Giddiness (R42) Precautions/Allergies: 
  Fall precautions Codeine and Pcn [penicillins] ASSESSMENT:  
Ms. Lovie Lesches is a 70 y.o. female admitted with the above. At baseline, pt lives alone in independent living and is independent with ADLs and functional mobility. Upon arrival, pt is alert, disoriented to date, and agreeable to OT evaluation. General UE screen revealed AROM WFL and strength generally decreased in BUEs. Pt completes functional transfers with SBA, functional mobility within room with RW and SBA requiring mod-max cues for RW management potentially due to decreased memory. Pt then practiced functional mobility without AD with SBA. Pt practiced toilet transfers and shower transfer with SBA and grooming in standing at sink with SBA. Pt reports she is at her baseline for ADLs and had no trouble dressing herself today. Pt left seated with family in room. Pt is currently functioning at/near baseline for ADLs. No acute OT needs identified at this time. Will d/c. This section established at most recent assessment PROBLEM LIST (Impairments causing functional limitations): 1. None INTERVENTIONS PLANNED: (Benefits and precautions of occupational therapy have been discussed with the patient.) 1. None TREATMENT PLAN: Frequency/Duration: Eval & d/c 
  
 
 RECOMMENDED REHABILITATION/EQUIPMENT: (at time of discharge pending progress): Due to the probability of continued deficits (see above) this patient will not likely need continued skilled occupational therapy after discharge. Equipment:  
? None at this time OCCUPATIONAL PROFILE AND HISTORY:  
History of Present Injury/Illness (Reason for Referral): 
See H&P. Past Medical History/Comorbidities: Ms. Charo Chamorro  has a past medical history of Diabetes [facility]), Endocrine disease, Hypertension, and Menopause. Ms. Charo Chamorro  has a past surgical history that includes hx hysterectomy and hx oophorectomy. Social History/Living Environment:  
Home Environment: Independent living One/Two Story Residence: One story Living Alone: Yes Support Systems: Child(chris), Family member(s) Patient Expects to be Discharged to[de-identified] Private residence Current DME Used/Available at Home: None Tub or Shower Type: Shower Prior Level of Function/Work/Activity: 
Independent with ADLs and functional mobility. Lives alone in independent living Personal Factors:   
      Sex:  female Age:  70 y.o. Number of Personal Factors/Comorbidities that affect the Plan of Care: Brief history (0):  LOW COMPLEXITY ASSESSMENT OF OCCUPATIONAL PERFORMANCE[de-identified]  
Activities of Daily Living:  
Basic ADLs (From Assessment) Complex ADLs (From Assessment) Feeding: Independent Oral Facial Hygiene/Grooming: Independent Bathing: Supervision Upper Body Dressing: Independent Lower Body Dressing: Modified independent Toileting: Modified independent Grooming/Bathing/Dressing Activities of Daily Living Cognitive Retraining Safety/Judgement: Awareness of environment; Fall prevention Functional Transfers Bathroom Mobility: Stand-by assistance Toilet Transfer : Stand-by assistance Shower Transfer: Stand-by assistance Bed/Mat Mobility Rolling: Independent Supine to Sit: Independent Sit to Stand: Stand-by assistance Bed to Chair: Stand-by assistance Scooting: Stand-by assistance Most Recent Physical Functioning:  
Gross Assessment: 
AROM: Within functional limits(BUEs) Strength: Generally decreased, functional(BUEs) Coordination: Within functional limits(BUEs) Sensation: Intact(BUEs) Posture: 
  
Balance: 
Sitting: Intact Standing: Impaired Standing - Static: Good Standing - Dynamic : Fair Bed Mobility: 
Rolling: Independent Supine to Sit: Independent Scooting: Stand-by assistance Wheelchair Mobility: 
  
Transfers: 
Sit to Stand: Stand-by assistance Stand to Sit: Stand-by assistance Bed to Chair: Stand-by assistance Patient Vitals for the past 6 hrs: 
 BP SpO2 Pulse 19 1149 138/71 100 % 63  
19 1523 151/62 100 % 68 Mental Status Neurologic State: Alert Orientation Level: Oriented to person, Appropriate for age, Disoriented to time Cognition: Follows commands Perception: Appears intact Perseveration: No perseveration noted Safety/Judgement: Awareness of environment, Fall prevention Physical Skills Involved: 1. None Cognitive Skills Affected (resulting in the inability to perform in a timely and safe manner): 1. Executive Function 2. Short Term Recall Psychosocial Skills Affected: 1. Habits/Routines 2. Self-Awareness Number of elements that affect the Plan of Care: 3-5:  MODERATE COMPLEXITY CLINICAL DECISION MAKIN [facility] [address] [id number] AM-PAC 6 Clicks Daily Activity Inpatient Short Form How much help from another person does the patient currently need. .. Total A Lot A Little None 1. Putting on and taking off regular lower body clothing? [] 1   [] 2   [x] 3   [] 4  
2. Bathing (including washing, rinsing, drying)? [] 1   [] 2   [] 3   [x] 4  
3. Toileting, which includes using toilet, bedpan or urinal?   [] 1   [] 2   [] 3   [x] 4  
4. Putting on and taking off regular upper body clothing?    [] 1   [] 2 [] 3   [x] 4  
5. Taking care of personal grooming such as brushing teeth? [] 1   [] 2   [] 3   [x] 4  
6. Eating meals? [] 1   [] 2   [] 3   [x] 4  
© 2007, Trustees of [facility] [employer], under license to [employer]. All rights reserved Score:  Initial: 23 1/25/2019  Most Recent: X (Date: -- ) Interpretation of Tool:  Represents activities that are increasingly more difficult (i.e. Bed mobility, Transfers, Gait). Medical Necessity:    
· NA. Reason for Services/Other Comments: 
· NA. Use of outcome tool(s) and clinical judgement create a POC that gives a: LOW COMPLEXITY  
 
 
 
TREATMENT:  
(In addition to Assessment/Re-Assessment sessions the following treatments were rendered) Pre-treatment Symptoms/Complaints:   
Pain: Initial:  
Pain Intensity 1: 0 Post Session:  same Assessment/Reassessment only, no treatment provided today Braces/Orthotics/Lines/Etc:  
· O2 Device: Room air Treatment/Session Assessment:   
Response to Treatment:  Assessment only · Interdisciplinary Collaboration:  
o Occupational Therapist 
o Registered Nurse · After treatment position/precautions:  
o Seated with family in room · Compliance with Program/Exercises: NA. Recommendations/Intent for next treatment session:  NA Total Treatment Duration: OT Patient Time In/Time Out Time In: 1534 Time Out: 1551 ABHILASH Gates/RANDALL

## 2019-01-25 NOTE — PROGRESS NOTES
LMSW received recommendation from PT for pt to have a rolling walker for home use. Referral sent to [facility] for delivery to pt room today in anticipation of possible discharge this weekend.

## 2019-01-25 NOTE — PROGRESS NOTES
Problem: Falls - Risk of 
Goal: *Absence of Falls Document Mitchell Rank Fall Risk and appropriate interventions in the flowsheet. Outcome: Progressing Towards Goal 
Fall Risk Interventions: 
  
 
  
 
Medication Interventions: Teach patient to arise slowly, Patient to call before getting OOB Elimination Interventions: Patient to call for help with toileting needs, Call light in reach Problem: Pressure Injury - Risk of 
Goal: *Prevention of pressure injury Document Lico Scale and appropriate interventions in the flowsheet. Outcome: Progressing Towards Goal 
Pressure Injury Interventions: Activity Interventions: Pressure redistribution bed/mattress(bed type) Mobility Interventions: Pressure redistribution bed/mattress (bed type) Nutrition Interventions: Discuss nutritional consult with provider

## 2019-01-25 NOTE — PROGRESS NOTES
Problem: Mobility Impaired (Adult and Pediatric) Goal: *Acute Goals and Plan of Care (Insert Text) PHYSICAL THERAPY: Initial Assessment, Discharge and PM 1/25/2019OBSERVATION:   
Payor: Billi Hatchet / Plan: [address] / Product Type: Managed Care Medicare /   
  
NAME/AGE/GENDER: Rosita Williamson is a 70 y.o. female PRIMARY DIAGNOSIS: UTI (urinary tract infection) UTI (urinary tract infection) <principal problem not specified> <principal problem not specified> 
 
  
ICD-10: Treatment Diagnosis:  
 · Generalized Muscle Weakness (M62.81) · History of falling (Z91.81) Precaution/Allergies: 
Codeine and Pcn [penicillins] ASSESSMENT:  
Ms. Geni Valdes presents sitting at edge of bed at arrival with 2gd's present. She agrees to treat. When asked to stand pt leaning against bed, when asked to step fwd, she needed to reach back then placed R calf against bed. When going to ambulate pt had 3 balance challenges. She was asked not to hold rail. When turning head she would stumble requiring rail or wall to recover. She tolerated 250ft without AD, but poor recovery skills with balance makes unsafe for home. Discussed use of RW with her, pt was reluctant but reported she understand need. Her impairments include decreased functional mobility, decreased balance, decreased safety awareness, increased risk for falls. Pt is able to tolerate all trnf and ambulation, but would improve with use of RW. Will discharge from PT due to SUP with session. This section established at most recent assessment PROBLEM LIST (Impairments causing functional limitations): 1. Decreased Balance INTERVENTIONS PLANNED: (Benefits and precautions of physical therapy have been discussed with the patient.) 1. Balance Exercise TREATMENT PLAN: Frequency/Duration: 1 time a week for 1 week Rehabilitation Potential For Stated Goals: Excellent RECOMMENDED REHABILITATION/EQUIPMENT: (at time of discharge pending progress): Due to the probability of continued deficits (see above) this patient will not likely need continued skilled physical therapy after discharge. Equipment:  
? Walkers, Type: Rolling [address] HISTORY:  
History of Present Injury/Illness (Reason for Referral): 
78yr old female with a pmhx sig for recent uti due to klebsiella pneumonia and hyponatremia. She was admitted to [address] from 01/08/2019 to 01/11/2019. She was discharged home on vantin. She has been unwell and went for follow up with her pcp and was started on bactrim for recurrence of her uti. 
  
Per the family the patient is on hctz and this was stopped and her pcp wrote her just a new prescription for the valsartan. I see no prior documentation of HCTZ. And will have the pharmacy pull old records with regard to prescriptions filled.  
  
Na is 117 today along with the uti so hospitalist asked to admit. She is confused- and cannot give the hx. Hx taken from family at the bedside Past Medical History/Comorbidities: Ms. Alexandra Pleitez  has a past medical history of Diabetes [facility]), Endocrine disease, Hypertension, and Menopause. Ms. Alexandra Pleitez  has a past surgical history that includes hx hysterectomy and hx oophorectomy. Social History/Living Environment:  
Home Environment: Independent living One/Two Story Residence: One story Living Alone: Yes Support Systems: Child(chris), Family member(s) Patient Expects to be Discharged to[de-identified] Private residence Current DME Used/Available at Home: None Prior Level of Function/Work/Activity: 
Pt lives in independent living, she furniture walks and states her balance is off in bwd direction. Number of Personal Factors/Comorbidities that affect the Plan of Care: 0: LOW COMPLEXITY EXAMINATION:  
Most Recent Physical Functioning:  
Gross Assessment: 
AROM: Within functional limits Strength: Within functional limits Coordination: Within functional limits Tone: Normal 
Sensation: Intact Posture: 
  
Balance: 
Sitting: Intact Standing: Impaired Standing - Static: Fair Standing - Dynamic : Fair Bed Mobility: 
Rolling: Independent Supine to Sit: Independent Scooting: Independent Wheelchair Mobility: 
  
Transfers: 
Sit to Stand: Minimum assistance Stand to Sit: Stand-by assistance Gait: 
  
Base of Support: Center of gravity altered; Widened Gait Abnormalities: Decreased step clearance;Lurching;Path deviations; Shuffling gait Distance (ft): 250 Feet (ft) Assistive Device: (none) Ambulation - Level of Assistance: Contact guard assistance Body Structures Involved: 1. Digestive Structures Body Functions Affected: 1. Mental 
2. Sensory/Pain 3. Neuromusculoskeletal 
4. Movement Related Activities and Participation Affected: 1. General Tasks and Demands 2. Mobility 3. Self Care 4. Domestic Life Number of elements that affect the Plan of Care: 1-2: LOW COMPLEXITY CLINICAL PRESENTATION:  
Presentation: Stable and uncomplicated: LOW COMPLEXITY CLINICAL DECISION MAKIN [place] [id number] AM-PAC 6 Clicks Basic Mobility Inpatient Short Form How much difficulty does the patient currently have. .. Unable A Lot A Little None 1. Turning over in bed (including adjusting bedclothes, sheets and blankets)? [] 1   [] 2   [] 3   [x] 4  
2. Sitting down on and standing up from a chair with arms ( e.g., wheelchair, bedside commode, etc.)   [] 1   [] 2   [] 3   [x] 4  
3. Moving from lying on back to sitting on the side of the bed? [] 1   [] 2   [] 3   [x] 4 How much help from another person does the patient currently need. .. Total A Lot A Little None 4. Moving to and from a bed to a chair (including a wheelchair)? [] 1   [] 2   [] 3   [x] 4  
5. Need to walk in hospital room? [] 1   [] 2   [x] 3   [] 4  
6. Climbing 3-5 steps with a railing?    [] 1   [] 2   [x] 3   [] 4  
 © 2007, Trustees of [address] [place] [address] [address] [address], under license to [employer]. All rights reserved Score:  Initial: 22 Most Recent: X (Date: -- ) Interpretation of Tool:  Represents activities that are increasingly more difficult (i.e. Bed mobility, Transfers, Gait). Medical Necessity:    
· Skilled intervention continues to be required due to decreased function. Reason for Services/Other Comments: 
· NA. Use of outcome tool(s) and clinical judgement create a POC that gives a: Clear prediction of patient's progress: LOW COMPLEXITY  
  
 
 
 
TREATMENT:  
(In addition to Assessment/Re-Assessment sessions the following treatments were rendered) Pre-treatment Symptoms/Complaints:  none Pain: Initial:  
Pain Intensity 1: 0  Post Session:  none Assessment/Reassessment only, no treatment provided today Braces/Orthotics/Lines/Etc:  
· O2 Device: Room air Treatment/Session Assessment:   
· Response to Treatment:  See above · Interdisciplinary Collaboration:  
o Physical Therapist 
o Registered Nurse · After treatment position/precautions:  
o Call light within reach 
o Family at bedside 
o EOB · Compliance with Program/Exercises: Compliant most of the time · Recommendations/Intent for next treatment session: \"Next visit will focus on advancements to more challenging activities and reduction in assistance provided\". Total Treatment Duration: PT Patient Time In/Time Out Time In: 2250 Time Out: 1350 Kathy Briscoe DPT

## 2019-01-26 ENCOUNTER — HOME HEALTH ADMISSION (OUTPATIENT)
Dept: HOME HEALTH SERVICES | Facility: HOME HEALTH | Age: 79
End: 2019-01-26
Payer: MEDICARE

## 2019-01-26 VITALS
OXYGEN SATURATION: 100 % | TEMPERATURE: 97.2 F | BODY MASS INDEX: 21.34 KG/M2 | DIASTOLIC BLOOD PRESSURE: 55 MMHG | SYSTOLIC BLOOD PRESSURE: 131 MMHG | RESPIRATION RATE: 18 BRPM | WEIGHT: 125 LBS | HEIGHT: 64 IN | HEART RATE: 71 BPM

## 2019-01-26 LAB
ANION GAP SERPL CALC-SCNC: 10 MMOL/L (ref 7–16)
BACTERIA SPEC CULT: NORMAL
BASOPHILS # BLD: 0 K/UL (ref 0–0.2)
BASOPHILS NFR BLD: 1 % (ref 0–2)
BUN SERPL-MCNC: 10 MG/DL (ref 8–23)
CALCIUM SERPL-MCNC: 8.6 MG/DL (ref 8.3–10.4)
CHLORIDE SERPL-SCNC: 104 MMOL/L (ref 98–107)
CO2 SERPL-SCNC: 21 MMOL/L (ref 21–32)
CREAT SERPL-MCNC: 0.59 MG/DL (ref 0.6–1)
DIFFERENTIAL METHOD BLD: ABNORMAL
EOSINOPHIL # BLD: 0.2 K/UL (ref 0–0.8)
EOSINOPHIL NFR BLD: 4 % (ref 0.5–7.8)
ERYTHROCYTE [DISTWIDTH] IN BLOOD BY AUTOMATED COUNT: 12.1 % (ref 11.9–14.6)
GLUCOSE BLD STRIP.AUTO-MCNC: 150 MG/DL (ref 65–100)
GLUCOSE SERPL-MCNC: 125 MG/DL (ref 65–100)
HCT VFR BLD AUTO: 36.3 % (ref 35.8–46.3)
HGB BLD-MCNC: 12.4 G/DL (ref 11.7–15.4)
IMM GRANULOCYTES # BLD AUTO: 0 K/UL (ref 0–0.5)
IMM GRANULOCYTES NFR BLD AUTO: 0 % (ref 0–5)
LYMPHOCYTES # BLD: 0.9 K/UL (ref 0.5–4.6)
LYMPHOCYTES NFR BLD: 16 % (ref 13–44)
MCH RBC QN AUTO: 31.2 PG (ref 26.1–32.9)
MCHC RBC AUTO-ENTMCNC: 34.2 G/DL (ref 31.4–35)
MCV RBC AUTO: 91.4 FL (ref 79.6–97.8)
MM INDURATION POC: 0 MM (ref 0–5)
MONOCYTES # BLD: 0.6 K/UL (ref 0.1–1.3)
MONOCYTES NFR BLD: 10 % (ref 4–12)
NEUTS SEG # BLD: 3.9 K/UL (ref 1.7–8.2)
NEUTS SEG NFR BLD: 68 % (ref 43–78)
NRBC # BLD: 0 K/UL (ref 0–0.2)
PLATELET # BLD AUTO: 272 K/UL (ref 150–450)
PMV BLD AUTO: 10.3 FL (ref 9.4–12.3)
POTASSIUM SERPL-SCNC: 3.5 MMOL/L (ref 3.5–5.1)
PPD POC: NEGATIVE NEGATIVE
RBC # BLD AUTO: 3.97 M/UL (ref 4.05–5.2)
SERVICE CMNT-IMP: NORMAL
SODIUM SERPL-SCNC: 135 MMOL/L (ref 136–145)
VIT B12 SERPL-MCNC: 309 PG/ML (ref 193–986)
WBC # BLD AUTO: 5.7 K/UL (ref 4.3–11.1)

## 2019-01-26 PROCEDURE — 96372 THER/PROPH/DIAG INJ SC/IM: CPT

## 2019-01-26 PROCEDURE — 82607 VITAMIN B-12: CPT

## 2019-01-26 PROCEDURE — 36415 COLL VENOUS BLD VENIPUNCTURE: CPT

## 2019-01-26 PROCEDURE — 74011636637 HC RX REV CODE- 636/637: Performed by: INTERNAL MEDICINE

## 2019-01-26 PROCEDURE — 74011250636 HC RX REV CODE- 250/636: Performed by: INTERNAL MEDICINE

## 2019-01-26 PROCEDURE — 99218 HC RM OBSERVATION: CPT

## 2019-01-26 PROCEDURE — 74011250637 HC RX REV CODE- 250/637: Performed by: INTERNAL MEDICINE

## 2019-01-26 PROCEDURE — 82962 GLUCOSE BLOOD TEST: CPT

## 2019-01-26 PROCEDURE — 85025 COMPLETE CBC W/AUTO DIFF WBC: CPT

## 2019-01-26 PROCEDURE — 80048 BASIC METABOLIC PNL TOTAL CA: CPT

## 2019-01-26 RX ORDER — PHENAZOPYRIDINE HYDROCHLORIDE 95 MG/1
95 TABLET ORAL 3 TIMES DAILY
Qty: 10 TAB | Refills: 0 | Status: SHIPPED | OUTPATIENT
Start: 2019-01-26 | End: 2019-04-03

## 2019-01-26 RX ADMIN — URINARY PAIN RELIEF 95 MG: 95 TABLET ORAL at 08:36

## 2019-01-26 RX ADMIN — ENOXAPARIN SODIUM 40 MG: 40 INJECTION SUBCUTANEOUS at 08:04

## 2019-01-26 RX ADMIN — INSULIN HUMAN 7 UNITS: 100 INJECTION, SUSPENSION SUBCUTANEOUS at 08:35

## 2019-01-26 RX ADMIN — INSULIN HUMAN 2 UNITS: 100 INJECTION, SOLUTION PARENTERAL at 08:05

## 2019-01-26 RX ADMIN — INSULIN HUMAN 3 UNITS: 100 INJECTION, SOLUTION PARENTERAL at 08:34

## 2019-01-26 NOTE — DISCHARGE SUMMARY
Hospitalist Discharge Summary     Admit Date:  2019  9:58 PM   Name:  José Manuel Guzmán   Age:  70 y.o.  :  1940   MRN:  [id number]   PCP:  Gustavo Lundberg MD  Treatment Team: Attending Provider: Enoch Guillaume MD; Utilization Review: Claudia Watson RN; Care Manager: Sima Hernandez RN    Problem List for this Hospitalization:  Hospital Problems as of 2019 Never Reviewed          Codes Class Noted - Resolved POA    Metabolic encephalopathy BDE-36-KD: G93.41  ICD-9-CM: 348.31  2019 - Present Yes        Encephalopathy ICD-10-CM: G93.40  ICD-9-CM: 348.30  2019 - Present Yes        UTI (urinary tract infection) ICD-10-CM: N39.0  ICD-9-CM: 599.0  2019 - Present Unknown        Hyponatremia ICD-10-CM: E87.1  ICD-9-CM: 276.1  2019 - Present Unknown        Acute UTI (urinary tract infection) ICD-10-CM: N39.0  ICD-9-CM: 599.0  2019 - Present Yes        HTN (hypertension) ICD-10-CM: I10  ICD-9-CM: 401.9  2019 - Present Yes        DM (diabetes mellitus) ([facility] ICD-10-CM: E11.9  ICD-9-CM: 250.00  2019 - Present Yes                  Hospital Course:       Ms. Nestor Knapp is a 71 yo female with PMH of nephrolithiasis, UTI, HTN, hyponatremia, DM2, menieres disease, who is evaluated with confusion, hyponatremia (117) and recurrent UTI. She has been managed with IVF with sodium improvement. HCTZ stopped, TSH/cortisol WNL. UA positive and she has compelted rocephin x 3 days, cx NGTD. Renal US  Shows a probably nonobstructing left renal stone and mildly complex right renal interpolar cortical cyst with possible calcified right renal lower pole mass. CT ABD renal protocol confirms right sided renal cyst, lipoma and left sided stone.  She will have urology referral for followup needs.        Her son present states she is still confused at times with a slow memory decline, was worked up by PCP with cognitive testing, however son and daughter are ok with discharge home with home health and PT. She does not drive. Follow up instructions and discharge meds at bottom of this note. Plan was discussed with patient and daughter  All questions answered. Patient was stable at time of discharge. Diagnostic Imaging/Tests:   [employer]    Result Date: 1/24/2019  Bilateral renal ultrasound History: recurrent uti, 78 years Female Comparison: None available Findings: The right kidney measures 10.9 CM in length. A mildly complex cystic lesion with internal septations is seen in the right renal interpolar cortex measuring 2.3 x 2.4 x 2.3 cm, without flow. Small simple appearing right renal interpolar cortical cyst measuring 4.2 x 3.9 x 4.5 cm, without flow. A hyperechoic mass with possible peripheral calcifications causing shadowing is seen in the right renal lower pole cortex measuring 1.9 x 1.7 x 1.9 cm, without flow. Small simple appearing left renal upper pole cortical cysts are seen measuring up to 2.7 x 3.1 cm, without flow. A small echogenic focus is noted in the left renal lower pole medullary level measuring 1.2 x 0.6 cm, likely representing a small nonobstructing calculus. Left kidney measures  11.1 CM. Partially full urinary bladder appears unremarkable. Impression: 1. No evidence of hydronephrosis. Probable small nonobstructing left renal calculus. 2.  Mildly complex right renal interpolar cortical cyst with a hyperechoic probably calcified right renal lower pole cortical mass. Elective CT of the abdomen with and without contrast renal mass protocol is recommended for further evaluation. Echocardiogram results:  No results found for this visit on 01/23/19.     Procedures done this admission:  * No surgery found *    All Micro Results     Procedure Component Value Units Date/Time    CULTURE, URINE [[id number]] Collected:  01/23/19 8997    Order Status:  Completed Specimen:  Urine from Clean catch Updated:  01/26/19 4924     Special Requests: NO SPECIAL REQUESTS        Culture result: NO GROWTH 2 DAYS             Labs: Results:       BMP, Mg, Phos Recent Labs     01/26/19  0514 01/25/19  0435 01/24/19  2205  01/24/19  0937 01/24/19  0509   * 133* 131*   < > 127* 126*   K 3.5 3.6  --   --  3.3*  --     102  --   --  94*  --    CO2 21 18*  --   --  23  --    AGAP 10 13  --   --  10  --    BUN 10 9  --   --  7*  --    CREA 0.59* 0.73  --   --  0.73  --    CA 8.6 8.9  --   --  8.7  --    * 137*  --   --  150*  --    MG  --  2.4  --   --  2.2 2.1    < > = values in this interval not displayed.       CBC Recent Labs     01/26/19  0514 01/25/19  0435 01/24/19  0937   WBC 5.7 5.2 4.8   RBC 3.97* 4.16 3.90*   HGB 12.4 13.0 12.3   HCT 36.3 39.0 34.2*    233 253   GRANS 68 62 71   LYMPH 16 25 19   EOS 4 1 1   MONOS 10 10 9   BASOS 1 0 0   IG 0 0 0   ANEU 3.9 3.3 3.4   ABL 0.9 1.3 0.9   LUIS ANTONIO 0.2 0.1 0.0   ABM 0.6 0.5 0.4   ABB 0.0 0.0 0.0   AIG 0.0 0.0 0.0      LFT Recent Labs     01/23/19  2216   SGOT 23   ALT 32   AP 75   TP 7.4   ALB 4.1   GLOB 3.3   AGRAT 1.2      Cardiac Testing No results found for: BNPP, BNP, CPK, RCK1, RCK2, RCK3, RCK4, CKMB, CKNDX, CKND1, TROPT, TROIQ   Coagulation Tests No results found for: PTP, INR, APTT   A1c Lab Results   Component Value Date/Time    Hemoglobin A1c 6.2 01/09/2019 03:29 AM      Lipid Panel No results found for: CHOL, CHOLPOCT, CHOLX, CHLST, CHOLV, 548503, HDL, LDL, LDLC, DLDLP, 518254, VLDLC, VLDL, TGLX, TRIGL, TRIGP, TGLPOCT, CH[facility], [facility]   Thyroid Panel Lab Results   Component Value Date/Time    TSH 2.580 01/09/2019 03:29 AM        Most Recent UA Lab Results   Component Value Date/Time    Color YELLOW 01/24/2019 06:15 PM    Appearance CLEAR 01/24/2019 06:15 PM    Specific gravity 1.006 03/02/2017 06:30 PM    pH (UA) 7.0 01/24/2019 06:15 PM    Protein NEGATIVE  01/24/2019 06:15 PM    Glucose NEGATIVE  01/24/2019 06:15 PM    Ketone NEGATIVE  01/24/2019 06:15 PM    Bilirubin NEGATIVE  01/24/2019 06:15 PM    Blood NEGATIVE  01/24/2019 06:15 PM    Urobilinogen 0.2 01/24/2019 06:15 PM    Nitrites NEGATIVE  01/24/2019 06:15 PM    Leukocyte Esterase TRACE (A) 01/24/2019 06:15 PM    WBC 5-10 01/23/2019 10:50 PM    RBC 0-3 01/23/2019 10:50 PM    Epithelial cells 0-3 01/23/2019 10:50 PM    Bacteria 0 01/24/2019 06:15 PM    Casts 0-3 01/23/2019 10:50 PM        Allergies   Allergen Reactions    Codeine Other (comments)    Pcn [Penicillins] Unknown (comments)     Immunization History   Administered Date(s) Administered    TB Skin Test (PPD) Intradermal 01/24/2019       All Labs from Last 24 Hrs:  Recent Results (from the past 24 hour(s))   GLUCOSE, POC    Collection Time: 01/25/19 11:46 AM   Result Value Ref Range    Glucose (POC) 209 (H) 65 - 100 mg/dL   GLUCOSE, POC    Collection Time: 01/25/19  4:36 PM   Result Value Ref Range    Glucose (POC) 143 (H) 65 - 100 mg/dL   GLUCOSE, POC    Collection Time: 01/25/19  6:44 PM   Result Value Ref Range    Glucose (POC) 188 (H) 65 - 100 mg/dL   GLUCOSE, POC    Collection Time: 01/25/19  9:48 PM   Result Value Ref Range    Glucose (POC) 147 (H) 65 - 100 mg/dL   PLEASE READ & DOCUMENT PPD TEST IN 48 HRS    Collection Time: 01/26/19  4:11 AM   Result Value Ref Range    PPD Negative Negative    mm Induration 0 mm   METABOLIC PANEL, BASIC    Collection Time: 01/26/19  5:14 AM   Result Value Ref Range    Sodium 135 (L) 136 - 145 mmol/L    Potassium 3.5 3.5 - 5.1 mmol/L    Chloride 104 98 - 107 mmol/L    CO2 21 21 - 32 mmol/L    Anion gap 10 7 - 16 mmol/L    Glucose 125 (H) 65 - 100 mg/dL    BUN 10 8 - 23 MG/DL    Creatinine 0.59 (L) 0.6 - 1.0 MG/DL    GFR est AA >60 >60 ml/min/1.73m2    GFR est non-AA >60 >60 ml/min/1.73m2    Calcium 8.6 8.3 - 10.4 MG/DL   CBC WITH AUTOMATED DIFF    Collection Time: 01/26/19  5:14 AM   Result Value Ref Range    WBC 5.7 4.3 - 11.1 K/uL    RBC 3.97 (L) 4.05 - 5.2 M/uL    HGB 12.4 11.7 - 15.4 g/dL    HCT 36.3 35.8 - 46.3 %    MCV 91.4 79.6 - 97.8 FL MCH 31.2 26.1 - 32.9 PG    MCHC 34.2 31.4 - 35.0 g/dL    RDW 12.1 11.9 - 14.6 %    PLATELET 688 489 - 140 K/uL    MPV 10.3 9.4 - 12.3 FL    ABSOLUTE NRBC 0.00 0.0 - 0.2 K/uL    DF AUTOMATED      NEUTROPHILS 68 43 - 78 %    LYMPHOCYTES 16 13 - 44 %    MONOCYTES 10 4.0 - 12.0 %    EOSINOPHILS 4 0.5 - 7.8 %    BASOPHILS 1 0.0 - 2.0 %    IMMATURE GRANULOCYTES 0 0.0 - 5.0 %    ABS. NEUTROPHILS 3.9 1.7 - 8.2 K/UL    ABS. LYMPHOCYTES 0.9 0.5 - 4.6 K/UL    ABS. MONOCYTES 0.6 0.1 - 1.3 K/UL    ABS. EOSINOPHILS 0.2 0.0 - 0.8 K/UL    ABS. BASOPHILS 0.0 0.0 - 0.2 K/UL    ABS. IMM.  GRANS. 0.0 0.0 - 0.5 K/UL   VITAMIN B12    Collection Time: 01/26/19  5:14 AM   Result Value Ref Range    Vitamin B12 309 193 - 986 pg/mL   GLUCOSE, POC    Collection Time: 01/26/19  6:08 AM   Result Value Ref Range    Glucose (POC) 150 (H) 65 - 100 mg/dL       Current Med List in Hospital:   Current Facility-Administered Medications   Medication Dose Route Frequency    insulin regular (NOVOLIN R, HUMULIN R) injection 3 Units  3 Units SubCUTAneous BID    And    insulin NPH (NOVOLIN N, HUMULIN N) injection 7 Units  7 Units SubCUTAneous BID    insulin regular (NOVOLIN R, HUMULIN R) injection   SubCUTAneous AC&HS    sodium chloride (NS) flush 5-40 mL  5-40 mL IntraVENous Q8H    sodium chloride (NS) flush 5-40 mL  5-40 mL IntraVENous PRN    acetaminophen (TYLENOL) tablet 650 mg  650 mg Oral Q4H PRN    ondansetron (ZOFRAN) injection 4 mg  4 mg IntraVENous Q4H PRN    enoxaparin (LOVENOX) injection 40 mg  40 mg SubCUTAneous Q24H    phenazopyridine (PYRIDIUM) tab 95 mg  95 mg Oral TID       Discharge Exam:  Patient Vitals for the past 24 hrs:   Temp Pulse Resp BP SpO2   01/26/19 0737 97.2 °F (36.2 °C) 71 18 131/55 100 %   01/26/19 0320 98.1 °F (36.7 °C) 66 18 128/66 99 %   01/25/19 2320 98.1 °F (36.7 °C) 69 18 157/63 100 %   01/25/19 1900 98 °F (36.7 °C) 73 18 163/70 99 %   01/25/19 1523 97.9 °F (36.6 °C) 68 18 151/62 100 %   01/25/19 1149 97.7 °F (36.5 °C) 63 18 138/71 100 %     Oxygen Therapy  O2 Sat (%): 100 % (01/26/19 0737)  Pulse via Oximetry: 70 beats per minute (01/23/19 2257)  O2 Device: Room air (01/25/19 2055)    Intake/Output Summary (Last 24 hours) at 1/26/2019 0935  Last data filed at 1/26/2019 0737  Gross per 24 hour   Intake    Output 200 ml   Net -200 ml       *Note that automatically entered I/Os may not be accurate; dependent on patient compliance with collection and accurate  by assistants. General:    Well nourished. Alert. No distress   CV:   Regular rate and rhythm. No murmur, rub, or gallop. No edema  Lungs:  Clear to auscultation bilaterally. No wheezing, rhonchi, or rales. Abdomen: Soft, nontender, nondistended. Bowel sounds normal.   Extremities: Warm and dry. Neurologic: grossly intact. Skin:     No rashes or jaundice. Psych:  Normal mood and affect. Discharge Info:   Current Discharge Medication List      START taking these medications    Details   phenazopyridine (PYRIDIUM) 95 mg tab Take 1 Tab by mouth three (3) times daily. Qty: 10 Tab, Refills: 0         CONTINUE these medications which have NOT CHANGED    Details   ALPRAZolam (XANAX) 0.25 mg tablet Take 0.25 mg by mouth three (3) times daily as needed for Anxiety. alendronate (FOSAMAX) 70 mg tablet Take 70 mg by mouth. buPROPion XL (WELLBUTRIN XL) 300 mg XL tablet Take 300 mg by mouth every morning. atorvastatin (LIPITOR) 40 mg tablet Take 40 mg by mouth daily. amLODIPine (NORVASC) 10 mg tablet Take 10 mg by mouth daily. insulin NPH/insulin regular (HUMULIN 70/30 U-100 INSULIN) 100 unit/mL (70-30) injection 10 Units by SubCUTAneous route two (2) times a day. metFORMIN (GLUCOPHAGE) 1,000 mg tablet Take 1,000 mg by mouth daily. omeprazole (PRILOSEC) 40 mg capsule Take 40 mg by mouth daily. mometasone (ELOCON) 0.1 % ointment Apply  to affected area daily.          STOP taking these medications cefpodoxime (VANTIN) 200 mg tablet Comments:   Reason for Stopping:         valsartan (DIOVAN) 320 mg tablet Comments:   Reason for Stopping:                 Disposition: home    Activity: Activity as tolerated  Diet: DIET DIABETIC WITH OPTIONS Consistent Carb 2000kcal; Regular    Follow-up Appointments   Procedures    FOLLOW UP VISIT Appointment in: One Week 1 week pcp, [facility] for right renal cyst and left renal stone     1 week pcp, needs new visit [facility] 2 weeks for right renal cyst and left renal stone     Standing Status:   Standing     Number of Occurrences:   1     Order Specific Question:   Appointment in     Answer: One Week         Follow-up Information     Follow up With Specialties Details Why Contact Info    Hansel Nunes MD Internal Medicine   [address]  [phone]            Time spent in patient discharge planning and coordination 45 minutes.     Signed:  Skeet Brittle, MD

## 2019-01-26 NOTE — PROGRESS NOTES
Discharged to home. To [place] area via wheelchair accompanied by staff. To home with her daughter. Condition stable at discharge.

## 2019-01-26 NOTE — PROGRESS NOTES
Care Management Interventions Transition of Care Consult (CM Consult): Home Health([facility]) [address]: Yes Discharge Durable Medical Equipment: Yes([facility] rolling walker) Current Support Network: Other([facility]) Confirm Follow Up Transport: Family Plan discussed with Pt/Family/Caregiver: Yes Freedom of Choice Offered: Yes Discharge Location Discharge Placement: Home with home health Pt discharged home with orders for [facility] follow up and referral to [facility]. [employer] delivered rolling walker to pt room yesterday afternoon.

## 2019-01-26 NOTE — PROGRESS NOTES
[facility] - INPATIENT Face to Face Encounter Patients Name: Najma Mccormick    YOB: 1940 Ordering Physician: Aleyda Smith MD  
 
Primary Diagnosis: UTI (urinary tract infection) UTI (urinary tract infection) Date of Face to Face:   1/26/2019 Face to Face Encounter findings are related to primary reason for home care:   yes. 1. I certify that the patient needs intermittent care as follows: skilled nursing care:  skilled observation/assessment, patient education 
physical therapy: strengthening 2. I certify that this patient is homebound, that is: 1) patient requires the use of a walker device, special transportation, or assistance of another to leave the home; or 2) patient's condition makes leaving the home medically contraindicated; and 3) patient has a normal inability to leave the home and leaving the home requires considerable and taxing effort. Patient may leave the home for infrequent and short duration for medical reasons, and occasional absences for non-medical reasons. Homebound status is due to the following functional limitations: Patient with strength deficits limiting the performance of all ADL's without caregiver assistance or the use of an assistive device. 3. I certify that this patient is under my care and that I, or a nurse practitioner or  [id number], or clinical nurse specialist, or certified nurse midwife, working with me, had a Face-to-Face Encounter that meets the physician Face-to-Face Encounter requirements. The following are the clinical findings from the [address] [place] [address] encounter that support the need for skilled services and is a summary of the encounter: see hospital medical record See discharge summary Kehinde Evans LMSW 
1/26/2019 THE FOLLOWING TO BE COMPLETED BY THE COMMUNITY PHYSICIAN: 
 
I concur with the findings described above from the [facility] encounter that this patient is homebound and in need of a skilled service. Certifying Physician: _____________________________________ Printed Certifying Physician Name: _____________________________________ Date: _________________

## 2019-01-26 NOTE — PROGRESS NOTES
Discharge instructions reviewed with patient and her daughter including medications, activity, call to make follow up appointments, when to call the doctor. UTI and hyponatremia information. Both verbalized understanding and denied questions. Copy of discharge instructions and prescription given to patient. PIV removed, tip intact. Condition stable

## 2019-01-26 NOTE — DISCHARGE INSTRUCTIONS
Urinary Tract Infection in Women: Care Instructions  Your Care Instructions    A urinary tract infection, or UTI, is a general term for an infection anywhere between the kidneys and the urethra (where urine comes out). Most UTIs are bladder infections. They often cause pain or burning when you urinate. UTIs are caused by bacteria and can be cured with antibiotics. Be sure to complete your treatment so that the infection goes away. Follow-up care is a key part of your treatment and safety. Be sure to make and go to all appointments, and call your doctor if you are having problems. It's also a good idea to know your test results and keep a list of the medicines you take. How can you care for yourself at home? · Take your antibiotics as directed. Do not stop taking them just because you feel better. You need to take the full course of antibiotics. · Drink extra water and other fluids for the next day or two. This may help wash out the bacteria that are causing the infection. (If you have kidney, heart, or liver disease and have to limit fluids, talk with your doctor before you increase your fluid intake.)  · Avoid drinks that are carbonated or have caffeine. They can irritate the bladder. · Urinate often. Try to empty your bladder each time. · To relieve pain, take a hot bath or lay a heating pad set on low over your lower belly or genital area. Never go to sleep with a heating pad in place. To prevent UTIs  · Drink plenty of water each day. This helps you urinate often, which clears bacteria from your system. (If you have kidney, heart, or liver disease and have to limit fluids, talk with your doctor before you increase your fluid intake.)  · Urinate when you need to. · Urinate right after you have sex. · Change sanitary pads often. · Avoid douches, bubble baths, feminine hygiene sprays, and other feminine hygiene products that have deodorants.   · After going to the bathroom, wipe from front to back.  When should you call for help? Call your doctor now or seek immediate medical care if:    · Symptoms such as fever, chills, nausea, or vomiting get worse or appear for the first time.     · You have new pain in your back just below your rib cage. This is called flank pain.     · There is new blood or pus in your urine.     · You have any problems with your antibiotic medicine.    Watch closely for changes in your health, and be sure to contact your doctor if:    · You are not getting better after taking an antibiotic for 2 days.     · Your symptoms go away but then come back. Where can you learn more? Go to http://luz marina-norris.info/. Enter E273 in the search box to learn more about \"Urinary Tract Infection in Women: Care Instructions. \"  Current as of: March 20, 2018  Content Version: 11.9  © 7169-5009 [employer]. Care instructions adapted under license by [employer] (which disclaims liability or warranty for this information). If you have questions about a medical condition or this instruction, always ask your healthcare professional. [address] any warranty or liability for your use of this information. Hyponatremia: Care Instructions  Your Care Instructions    Hyponatremia (say \"mg-fw-kmt-TREE-tania-uh\") means that you don't have enough sodium in your blood. It can cause nausea, vomiting, and headaches. Or you may not feel hungry. In serious cases, it can cause seizures, a coma, or even death. Hyponatremia is not a disease. It is a problem caused by something else, such as medicines or exercising for a long time in hot weather. You can get hyponatremia if you lose a lot of fluids and then you drink a lot of water or other liquids that don't have much sodium. You can also get it if you have kidney, liver, heart, or other health problems.   Treatment is focused on getting your sodium levels back to normal.  Follow-up care is a key part of your treatment and safety. Be sure to make and go to all appointments, and call your doctor if you are having problems. It's also a good idea to know your test results and keep a list of the medicines you take. How can you care for yourself at home? · If your doctor recommends it, drink fluids that have sodium. Sports drinks are a good choice. Or you can eat salty foods. · If your doctor recommends it, limit the amount of water you drink. And limit fluids that are mostly water. These include tea, coffee, and juice. · Take your medicines exactly as prescribed. Call your doctor if you have any problems with your medicine. · Get your sodium levels tested when your doctor tells you to. When should you call for help? Call 911 anytime you think you may need emergency care. For example, call if:    · You have a seizure.     · You passed out (lost consciousness).    Call your doctor now or seek immediate medical care if:    · You are confused or it is hard to focus.     · You have little or no appetite.     · You feel sick to your stomach or you vomit.     · You have a headache.     · You have mood changes.     · You feel more tired than usual.    Watch closely for changes in your health, and be sure to contact your doctor if:    · You do not get better as expected. Where can you learn more? Go to http://luz marina-norris.info/. Enter H794 in the search box to learn more about \"Hyponatremia: Care Instructions. \"  Current as of: June 25, 2018  Content Version: 11.9  © 5125-0786 [employer]. Care instructions adapted under license by [employer] (which disclaims liability or warranty for this information). If you have questions about a medical condition or this instruction, always ask your healthcare professional. [place] [address] any warranty or liability for your use of this information.       DISCHARGE SUMMARY from Nurse    PATIENT INSTRUCTIONS:    After general anesthesia or intravenous sedation, for 24 hours or while taking prescription Narcotics:  · Limit your activities  · Do not drive and operate hazardous machinery  · Do not make important personal or business decisions  · Do  not drink alcoholic beverages  · If you have not urinated within 8 hours after discharge, please contact your surgeon on call. Report the following to your surgeon:  · Excessive pain, swelling, redness or odor of or around the surgical area  · Temperature over 100.5  · Nausea and vomiting lasting longer than 4 hours or if unable to take medications  · Any signs of decreased circulation or nerve impairment to extremity: change in color, persistent  numbness, tingling, coldness or increase pain  · Any questions    What to do at Home:    Recommended activity: Activity as tolerated    If you experience any of the following symptoms, please call your doctor: Temperature of 100.5 or greater, increased pain, any signs of abnormal bleeding, increased or painful urination. CALL [facility] FOR A NEW APPOINTMENT IN 2 WEEKS- [phone]. APPOINTMENT IS FOR RIGHT RENAL CYST AND LEFT RENAL STONE    CALL YOUR PRIMARY CARE PHYSICIAN FOR A FOLLOW UP APPOINTMENT IN 1 WEEK. YOUR DOCTOR HAS ORDERED A HOME HEALTH NURSE AND PHYSICAL THERAPY. THEY WILL BE CALLING YOU TO SET UP A TIME    *  Please give a list of your current medications to your Primary Care Provider. *  Please update this list whenever your medications are discontinued, doses are      changed, or new medications (including over-the-counter products) are added. *  Please carry medication information at all times in case of emergency situations. These are general instructions for a healthy lifestyle:    No smoking/ No tobacco products/ Avoid exposure to second hand smoke  Surgeon General's Warning:  Quitting smoking now greatly reduces serious risk to your health.     Obesity, smoking, and sedentary lifestyle greatly increases your risk for illness    A healthy diet, regular physical exercise & weight monitoring are important for maintaining a healthy lifestyle    You may be retaining fluid if you have a history of heart failure or if you experience any of the following symptoms:  Weight gain of 3 pounds or more overnight or 5 pounds in a week, increased swelling in our hands or feet or shortness of breath while lying flat in bed. Please call your doctor as soon as you notice any of these symptoms; do not wait until your next office visit. Recognize signs and symptoms of STROKE:    F-face looks uneven    A-arms unable to move or move unevenly    S-speech slurred or non-existent    T-time-call 911 as soon as signs and symptoms begin-DO NOT go       Back to bed or wait to see if you get better-TIME IS BRAIN. Warning Signs of HEART ATTACK     Call 911 if you have these symptoms:   Chest discomfort. Most heart attacks involve discomfort in the center of the chest that lasts more than a few minutes, or that goes away and comes back. It can feel like uncomfortable pressure, squeezing, fullness, or pain.  Discomfort in other areas of the upper body. Symptoms can include pain or discomfort in one or both arms, the back, neck, jaw, or stomach.  Shortness of breath with or without chest discomfort.  Other signs may include breaking out in a cold sweat, nausea, or lightheadedness. Don't wait more than five minutes to call 911 - MINUTES MATTER! Fast action can save your life. Calling 911 is almost always the fastest way to get lifesaving treatment. Emergency Medical Services staff can begin treatment when they arrive -- up to an hour sooner than if someone gets to the hospital by car. The discharge information has been reviewed with the patient. The patient verbalized understanding.   Discharge medications reviewed with the patient and appropriate educational materials and side effects teaching were provided.   ___________________________________________________________________________________________________________________________________

## 2019-01-27 ENCOUNTER — HOME CARE VISIT (OUTPATIENT)
Dept: SCHEDULING | Facility: HOME HEALTH | Age: 79
End: 2019-01-27
Payer: MEDICARE

## 2019-01-27 VITALS
DIASTOLIC BLOOD PRESSURE: 63 MMHG | HEART RATE: 80 BPM | SYSTOLIC BLOOD PRESSURE: 143 MMHG | TEMPERATURE: 98.1 F | RESPIRATION RATE: 16 BRPM

## 2019-01-27 PROCEDURE — 400013 HH SOC

## 2019-01-27 PROCEDURE — G0299 HHS/HOSPICE OF RN EA 15 MIN: HCPCS

## 2019-01-29 ENCOUNTER — HOME CARE VISIT (OUTPATIENT)
Dept: SCHEDULING | Facility: HOME HEALTH | Age: 79
End: 2019-01-29
Payer: MEDICARE

## 2019-01-29 VITALS
OXYGEN SATURATION: 100 % | RESPIRATION RATE: 18 BRPM | SYSTOLIC BLOOD PRESSURE: 136 MMHG | DIASTOLIC BLOOD PRESSURE: 61 MMHG | TEMPERATURE: 97.8 F | HEART RATE: 64 BPM

## 2019-01-29 PROCEDURE — G0299 HHS/HOSPICE OF RN EA 15 MIN: HCPCS

## 2019-01-29 PROCEDURE — G0151 HHCP-SERV OF PT,EA 15 MIN: HCPCS

## 2019-01-30 ENCOUNTER — HOSPITAL ENCOUNTER (OUTPATIENT)
Dept: LAB | Age: 79
Discharge: HOME | End: 2019-01-30
Attending: INTERNAL MEDICINE
Payer: MEDICARE

## 2019-01-30 VITALS
DIASTOLIC BLOOD PRESSURE: 63 MMHG | OXYGEN SATURATION: 98 % | RESPIRATION RATE: 18 BRPM | SYSTOLIC BLOOD PRESSURE: 154 MMHG | TEMPERATURE: 97.2 F | HEART RATE: 74 BPM

## 2019-01-30 LAB
APPEARANCE UR: CLEAR
BACTERIA URNS QL MICRO: 0 /HPF
BILIRUB UR QL: NEGATIVE
CASTS URNS QL MICRO: 0 /LPF
COLOR UR: YELLOW
CRYSTALS URNS QL MICRO: ABNORMAL /LPF
EPI CELLS #/AREA URNS HPF: ABNORMAL /HPF
GLUCOSE UR STRIP.AUTO-MCNC: NEGATIVE MG/DL
HGB UR QL STRIP: NEGATIVE
KETONES UR QL STRIP.AUTO: NEGATIVE MG/DL
LEUKOCYTE ESTERASE UR QL STRIP.AUTO: ABNORMAL
NITRITE UR QL STRIP.AUTO: NEGATIVE
PH UR STRIP: 6 [PH] (ref 5–9)
PROT UR STRIP-MCNC: NEGATIVE MG/DL
RBC #/AREA URNS HPF: ABNORMAL /HPF
SP GR UR REFRACTOMETRY: 1.01 (ref 1–1.02)
UROBILINOGEN UR QL STRIP.AUTO: 0.2 EU/DL (ref 0.2–1)
WBC URNS QL MICRO: ABNORMAL /HPF

## 2019-01-30 PROCEDURE — 87086 URINE CULTURE/COLONY COUNT: CPT

## 2019-01-30 PROCEDURE — 81001 URINALYSIS AUTO W/SCOPE: CPT

## 2019-02-01 ENCOUNTER — HOME CARE VISIT (OUTPATIENT)
Dept: SCHEDULING | Facility: HOME HEALTH | Age: 79
End: 2019-02-01
Payer: MEDICARE

## 2019-02-01 PROCEDURE — G0299 HHS/HOSPICE OF RN EA 15 MIN: HCPCS

## 2019-02-02 LAB
BACTERIA SPEC CULT: NORMAL
SERVICE CMNT-IMP: NORMAL

## 2019-02-04 VITALS
TEMPERATURE: 97.6 F | HEART RATE: 88 BPM | DIASTOLIC BLOOD PRESSURE: 80 MMHG | RESPIRATION RATE: 20 BRPM | OXYGEN SATURATION: 98 % | SYSTOLIC BLOOD PRESSURE: 143 MMHG

## 2019-02-05 ENCOUNTER — HOME CARE VISIT (OUTPATIENT)
Dept: SCHEDULING | Facility: HOME HEALTH | Age: 79
End: 2019-02-05
Payer: MEDICARE

## 2019-02-05 PROCEDURE — G0299 HHS/HOSPICE OF RN EA 15 MIN: HCPCS

## 2019-02-06 VITALS
RESPIRATION RATE: 18 BRPM | TEMPERATURE: 97.9 F | DIASTOLIC BLOOD PRESSURE: 60 MMHG | OXYGEN SATURATION: 99 % | SYSTOLIC BLOOD PRESSURE: 142 MMHG | HEART RATE: 66 BPM

## 2019-02-07 ENCOUNTER — HOSPITAL ENCOUNTER (OUTPATIENT)
Dept: LAB | Age: 79
Discharge: HOME | End: 2019-02-07
Attending: INTERNAL MEDICINE
Payer: MEDICARE

## 2019-02-07 ENCOUNTER — HOME CARE VISIT (OUTPATIENT)
Dept: SCHEDULING | Facility: HOME HEALTH | Age: 79
End: 2019-02-07
Payer: MEDICARE

## 2019-02-07 LAB
APPEARANCE UR: CLEAR
BACTERIA URNS QL MICRO: 0 /HPF
BILIRUB UR QL: NEGATIVE
COLOR UR: YELLOW
EPI CELLS #/AREA URNS HPF: ABNORMAL /HPF
GLUCOSE UR STRIP.AUTO-MCNC: NEGATIVE MG/DL
HGB UR QL STRIP: NEGATIVE
KETONES UR QL STRIP.AUTO: NEGATIVE MG/DL
LEUKOCYTE ESTERASE UR QL STRIP.AUTO: ABNORMAL
NITRITE UR QL STRIP.AUTO: NEGATIVE
OTHER OBSERVATIONS,UCOM: ABNORMAL
PH UR STRIP: 6 [PH] (ref 5–9)
PROT UR STRIP-MCNC: NEGATIVE MG/DL
RBC #/AREA URNS HPF: ABNORMAL /HPF
SP GR UR REFRACTOMETRY: 1 (ref 1–1.02)
UROBILINOGEN UR QL STRIP.AUTO: 0.2 EU/DL (ref 0.2–1)
WBC URNS QL MICRO: ABNORMAL /HPF

## 2019-02-07 PROCEDURE — G0299 HHS/HOSPICE OF RN EA 15 MIN: HCPCS

## 2019-02-07 PROCEDURE — 87186 SC STD MICRODIL/AGAR DIL: CPT

## 2019-02-07 PROCEDURE — 87088 URINE BACTERIA CULTURE: CPT

## 2019-02-07 PROCEDURE — 81001 URINALYSIS AUTO W/SCOPE: CPT

## 2019-02-07 PROCEDURE — 87086 URINE CULTURE/COLONY COUNT: CPT

## 2019-02-10 LAB
BACTERIA SPEC CULT: ABNORMAL
SERVICE CMNT-IMP: ABNORMAL

## 2019-02-11 ENCOUNTER — HOME CARE VISIT (OUTPATIENT)
Dept: HOME HEALTH SERVICES | Facility: HOME HEALTH | Age: 79
End: 2019-02-11
Payer: MEDICARE

## 2019-02-11 VITALS
HEART RATE: 74 BPM | RESPIRATION RATE: 17 BRPM | TEMPERATURE: 98.2 F | DIASTOLIC BLOOD PRESSURE: 65 MMHG | OXYGEN SATURATION: 98 % | SYSTOLIC BLOOD PRESSURE: 147 MMHG

## 2019-03-25 PROBLEM — N39.0 ACUTE UTI (URINARY TRACT INFECTION): Status: RESOLVED | Noted: 2019-01-08 | Resolved: 2019-03-25

## 2019-05-20 PROBLEM — N95.2 VAGINAL ATROPHY: Status: ACTIVE | Noted: 2019-05-20

## 2019-06-03 ENCOUNTER — HOSPITAL ENCOUNTER (OUTPATIENT)
Dept: CT IMAGING | Age: 79
Discharge: HOME | End: 2019-06-03
Attending: UROLOGY
Payer: MEDICARE

## 2019-06-03 DIAGNOSIS — N28.89 RENAL MASS: ICD-10-CM

## 2019-06-03 LAB — CREAT BLD-MCNC: 0.8 MG/DL (ref 0.8–1.5)

## 2019-06-03 PROCEDURE — 74170 CT ABD WO CNTRST FLWD CNTRST: CPT

## 2019-06-03 PROCEDURE — 74011636320 HC RX REV CODE- 636/320: Performed by: UROLOGY

## 2019-06-03 PROCEDURE — 82565 ASSAY OF CREATININE: CPT

## 2019-06-03 PROCEDURE — 74011000258 HC RX REV CODE- 258: Performed by: UROLOGY

## 2019-06-03 RX ORDER — SODIUM CHLORIDE 0.9 % (FLUSH) 0.9 %
10 SYRINGE (ML) INJECTION
Status: COMPLETED | OUTPATIENT
Start: 2019-06-03 | End: 2019-06-03

## 2019-06-03 RX ADMIN — Medication 10 ML: at 09:17

## 2019-06-03 RX ADMIN — SODIUM CHLORIDE 100 ML: 900 INJECTION, SOLUTION INTRAVENOUS at 09:17

## 2019-06-03 RX ADMIN — IOPAMIDOL 100 ML: 755 INJECTION, SOLUTION INTRAVENOUS at 09:17

## 2019-06-21 ENCOUNTER — HOSPITAL ENCOUNTER (OUTPATIENT)
Dept: SURGERY | Age: 79
Discharge: HOME | End: 2019-06-21
Payer: MEDICARE

## 2019-06-21 VITALS
BODY MASS INDEX: 20.06 KG/M2 | SYSTOLIC BLOOD PRESSURE: 194 MMHG | TEMPERATURE: 98 F | RESPIRATION RATE: 16 BRPM | HEART RATE: 63 BPM | WEIGHT: 120.4 LBS | HEIGHT: 65 IN | OXYGEN SATURATION: 99 % | DIASTOLIC BLOOD PRESSURE: 59 MMHG

## 2019-06-21 LAB
ANION GAP SERPL CALC-SCNC: 9 MMOL/L (ref 7–16)
ATRIAL RATE: 59 BPM
BUN SERPL-MCNC: 12 MG/DL (ref 8–23)
CALCIUM SERPL-MCNC: 9.6 MG/DL (ref 8.3–10.4)
CALCULATED P AXIS, ECG09: 79 DEGREES
CALCULATED R AXIS, ECG10: -30 DEGREES
CALCULATED T AXIS, ECG11: 69 DEGREES
CHLORIDE SERPL-SCNC: 96 MMOL/L (ref 98–107)
CO2 SERPL-SCNC: 29 MMOL/L (ref 21–32)
CREAT SERPL-MCNC: 0.91 MG/DL (ref 0.6–1)
DIAGNOSIS, 93000: NORMAL
ERYTHROCYTE [DISTWIDTH] IN BLOOD BY AUTOMATED COUNT: 11.5 % (ref 11.9–14.6)
GLUCOSE BLD STRIP.AUTO-MCNC: 118 MG/DL (ref 65–100)
GLUCOSE BLD STRIP.AUTO-MCNC: 61 MG/DL (ref 65–100)
GLUCOSE BLD STRIP.AUTO-MCNC: 87 MG/DL (ref 65–100)
GLUCOSE SERPL-MCNC: 75 MG/DL (ref 65–100)
HCT VFR BLD AUTO: 40.1 % (ref 35.8–46.3)
HGB BLD-MCNC: 13.2 G/DL (ref 11.7–15.4)
MCH RBC QN AUTO: 30.8 PG (ref 26.1–32.9)
MCHC RBC AUTO-ENTMCNC: 32.9 G/DL (ref 31.4–35)
MCV RBC AUTO: 93.5 FL (ref 79.6–97.8)
NRBC # BLD: 0 K/UL (ref 0–0.2)
P-R INTERVAL, ECG05: 176 MS
PLATELET # BLD AUTO: 265 K/UL (ref 150–450)
PMV BLD AUTO: 11.5 FL (ref 9.4–12.3)
POTASSIUM SERPL-SCNC: 3.4 MMOL/L (ref 3.5–5.1)
Q-T INTERVAL, ECG07: 416 MS
QRS DURATION, ECG06: 84 MS
QTC CALCULATION (BEZET), ECG08: 411 MS
RBC # BLD AUTO: 4.29 M/UL (ref 4.05–5.2)
SODIUM SERPL-SCNC: 134 MMOL/L (ref 136–145)
VENTRICULAR RATE, ECG03: 59 BPM
WBC # BLD AUTO: 7.2 K/UL (ref 4.3–11.1)

## 2019-06-21 PROCEDURE — 85027 COMPLETE CBC AUTOMATED: CPT

## 2019-06-21 PROCEDURE — 93005 ELECTROCARDIOGRAM TRACING: CPT | Performed by: ANESTHESIOLOGY

## 2019-06-21 PROCEDURE — 82962 GLUCOSE BLOOD TEST: CPT

## 2019-06-21 PROCEDURE — 80048 BASIC METABOLIC PNL TOTAL CA: CPT

## 2019-06-21 RX ORDER — HYDROCHLOROTHIAZIDE 12.5 MG/1
12.5 CAPSULE ORAL DAILY
COMMUNITY
End: 2021-02-01 | Stop reason: ALTCHOICE

## 2019-06-21 NOTE — PERIOP NOTES
Chart flagged for charge nurse for walk in blood bank labs. Patient confirms name and . Order to obtain consent  found in EHR and  matches case posting. Type 3 surgery, PAT walk-in assessment complete. Labs per surgeon: T&C- blood bank labs signed and held for patient to return 2019 to the [facility] between 8 am and 4 pm. Patient and granddaughter verbalize understanding that green blood bank bracelet must not be removed or surgery could be cancelled. Labs per anesthesia protocol: CBC, BMP  EKG: completed WDL  Glucose: 87. At the end of appointment patient stated she felt like blood sugar was low. POC check 61. Patient given juice and ate a granola bar from her bag and was provided with peanut butter. After 15 mins,  and patient states she \"feels much better\". Patient ambulated without difficulty to chair by hospital door. Nurse stayed with patient while granddaughter retrieved car and drove to pick her up. Patient was alert and oriented and denied feeling any symptoms of hypoglycemia before leaving hospital. She and her granddaughter were instructed to carefully monitor blood glucose and immediately report any concerns to primary provider. They both verbalize understanding. Rx bottles visualized today. Hibiclens and instructions given per hospital policy. Patient provided with and instructed on educational handouts including Guide to Surgery, Pain Management, Hand Hygiene, Blood Transfusion Education, and [facility] Brochure. Patient answered medical/surgical history questions at their best of ability. All prior to admission medications documented in [facility]. Patient instructed to continue previous medications as prescribed prior to surgery and to take the following medications the day of surgery according to anesthesia guidelines with a small sip of water: bupropion, Prilosec, Bactrim .    Patient verbalizes understanding and repeats back of the following instructions regarding oral diabetic medications and  insulin management prior to procedure. Hold all oral diabetic medications and fast acting insulin while fasting    Day before procedure 6/26/2019  Morning: take 8 units which equals 80% of usual dose of 10 units  Evening: take 8 units which equals 80% of usual dose of 10 units    Morning of procedure 6/27/2019  Take 5 units (only if blood glucose is greater than 120) which equals 50% of usual dose of 10 units. If glucose less than 120, then hold. If hypoglycemia occurs patient is instructed to drink 4 oz of a CLEAR, sugary liquid and call pre-op for further instructions. Patient is provided with number to pre-op and verbalizes understanding of these instructions. Patient instructed to hold all vitamins 7 days prior to surgery and NSAIDS 5 days prior to surgery, patient verbalized understanding. Medications to be held: vitamins    Patient verbalizes understanding the following medications should not be taken morning of surgery: HCTZ, valsartan and that failure to follow these instructions may result in surgery being cancelled/ rescheduled. Patient instructed on the following:  Arrive at [facility], time of arrival to be called the day before by 1700  NPO after midnight including gum, mints, and ice chips. Responsible adult must drive patient to the hospital, stay during surgery, and patient will require supervision 24 hours after anesthesia. Use antibacterial soap and hibiclens in shower the night before surgery and on the morning of surgery. Leave all valuables (money and jewelry) at home but bring insurance card and ID on       DOS. Do not wear make-up, nail polish, lotions, cologne, perfumes, powders, or oil on skin. Patient teach back successful and patient demonstrates knowledge of instruction.

## 2019-06-21 NOTE — PERIOP NOTES
Recent Results (from the past 12 hour(s))   GLUCOSE, POC    Collection Time: 06/21/19 11:51 AM   Result Value Ref Range    Glucose (POC) 87 65 - 100 mg/dL   CBC W/O DIFF    Collection Time: 06/21/19 11:52 AM   Result Value Ref Range    WBC 7.2 4.3 - 11.1 K/uL    RBC 4.29 4.05 - 5.2 M/uL    HGB 13.2 11.7 - 15.4 g/dL    HCT 40.1 35.8 - 46.3 %    MCV 93.5 79.6 - 97.8 FL    MCH 30.8 26.1 - 32.9 PG    MCHC 32.9 31.4 - 35.0 g/dL    RDW 11.5 (L) 11.9 - 14.6 %    PLATELET 945 330 - 704 K/uL    MPV 11.5 9.4 - 12.3 FL    ABSOLUTE NRBC 0.00 0.0 - 0.2 K/uL   METABOLIC PANEL, BASIC    Collection Time: 06/21/19 11:52 AM   Result Value Ref Range    Sodium 134 (L) 136 - 145 mmol/L    Potassium 3.4 (L) 3.5 - 5.1 mmol/L    Chloride 96 (L) 98 - 107 mmol/L    CO2 29 21 - 32 mmol/L    Anion gap 9 7 - 16 mmol/L    Glucose 75 65 - 100 mg/dL    BUN 12 8 - 23 MG/DL    Creatinine 0.91 0.6 - 1.0 MG/DL    GFR est AA >60 >60 ml/min/1.73m2    GFR est non-AA >60 >60 ml/min/1.73m2    Calcium 9.6 8.3 - 10.4 MG/DL   GLUCOSE, POC    Collection Time: 06/21/19 12:42 PM   Result Value Ref Range    Glucose (POC) 61 (L) 65 - 100 mg/dL   GLUCOSE, POC    Collection Time: 06/21/19 12:56 PM   Result Value Ref Range    Glucose (POC) 118 (H) 65 - 100 mg/dL     Labs reviewed and WDL. Glucose addressed in previous nursing note. No further action required.

## 2019-06-26 ENCOUNTER — ANESTHESIA EVENT (OUTPATIENT)
Dept: SURGERY | Age: 79
DRG: 657 | End: 2019-06-26
Payer: MEDICARE

## 2019-06-26 ENCOUNTER — HOSPITAL ENCOUNTER (OUTPATIENT)
Dept: LAB | Age: 79
Discharge: HOME | End: 2019-06-26
Payer: MEDICARE

## 2019-06-26 PROCEDURE — 86923 COMPATIBILITY TEST ELECTRIC: CPT

## 2019-06-26 PROCEDURE — 86850 RBC ANTIBODY SCREEN: CPT

## 2019-06-26 PROCEDURE — 36415 COLL VENOUS BLD VENIPUNCTURE: CPT

## 2019-06-26 NOTE — PERIOP NOTES
Called to remind patient to come for T&C and she stated she had forgotten all about this, daughter was there and stated they would be here shortly

## 2019-06-27 ENCOUNTER — ANESTHESIA (OUTPATIENT)
Dept: SURGERY | Age: 79
DRG: 657 | End: 2019-06-27
Payer: MEDICARE

## 2019-06-27 ENCOUNTER — HOSPITAL ENCOUNTER (INPATIENT)
Age: 79
LOS: 6 days | Discharge: NURSING, REHAB OR LONG-TERM CARE | DRG: 657 | End: 2019-07-03
Attending: UROLOGY | Admitting: UROLOGY
Payer: MEDICARE

## 2019-06-27 PROBLEM — N28.89 RENAL MASS, RIGHT: Status: ACTIVE | Noted: 2019-06-27

## 2019-06-27 LAB
GLUCOSE BLD STRIP.AUTO-MCNC: 138 MG/DL (ref 65–100)
GLUCOSE BLD STRIP.AUTO-MCNC: 160 MG/DL (ref 65–100)
GLUCOSE BLD STRIP.AUTO-MCNC: 185 MG/DL (ref 65–100)
HCT VFR BLD AUTO: 34.6 % (ref 35.8–46.3)
HGB BLD-MCNC: 11.6 G/DL (ref 11.7–15.4)

## 2019-06-27 PROCEDURE — 77030039425 HC BLD LARYNG TRULITE DISP TELE -A: Performed by: ANESTHESIOLOGY

## 2019-06-27 PROCEDURE — 77030002996 HC SUT SLK J&J -A: Performed by: UROLOGY

## 2019-06-27 PROCEDURE — 77030008462 HC STPLR SKN PROX J&J -A: Performed by: UROLOGY

## 2019-06-27 PROCEDURE — 77030032490 HC SLV COMPR SCD KNE COVD -B: Performed by: UROLOGY

## 2019-06-27 PROCEDURE — 77030013567 HC DRN WND RESERV BARD -A: Performed by: UROLOGY

## 2019-06-27 PROCEDURE — 77030010517 HC APPL SEAL FLOSEL BAXT -B: Performed by: UROLOGY

## 2019-06-27 PROCEDURE — 77030018673: Performed by: UROLOGY

## 2019-06-27 PROCEDURE — 77030021678 HC GLIDESCP STAT DISP VERT -B: Performed by: ANESTHESIOLOGY

## 2019-06-27 PROCEDURE — 77030009527 HC GEL PRT SYS AMR -E: Performed by: UROLOGY

## 2019-06-27 PROCEDURE — 77030013292 HC BOWL MX PRSM J&J -A: Performed by: ANESTHESIOLOGY

## 2019-06-27 PROCEDURE — 74011250636 HC RX REV CODE- 250/636: Performed by: ANESTHESIOLOGY

## 2019-06-27 PROCEDURE — 88305 TISSUE EXAM BY PATHOLOGIST: CPT

## 2019-06-27 PROCEDURE — 77030016151 HC PROTCTR LNS DFOG COVD -B: Performed by: UROLOGY

## 2019-06-27 PROCEDURE — 77030000038 HC TIP SCIS LAPSCP SURI -B: Performed by: UROLOGY

## 2019-06-27 PROCEDURE — 77030013794 HC KT TRNSDUC BLD EDWD -B: Performed by: ANESTHESIOLOGY

## 2019-06-27 PROCEDURE — 77030008522 HC TBNG INSUF LAPRO STRY -B: Performed by: UROLOGY

## 2019-06-27 PROCEDURE — 74011250636 HC RX REV CODE- 250/636: Performed by: UROLOGY

## 2019-06-27 PROCEDURE — 77030005518 HC CATH URETH FOL 2W BARD -B: Performed by: UROLOGY

## 2019-06-27 PROCEDURE — 65270000029 HC RM PRIVATE

## 2019-06-27 PROCEDURE — 0TB04ZZ EXCISION OF RIGHT KIDNEY, PERCUTANEOUS ENDOSCOPIC APPROACH: ICD-10-PCS | Performed by: UROLOGY

## 2019-06-27 PROCEDURE — 76060000038 HC ANESTHESIA 3.5 TO 4 HR: Performed by: UROLOGY

## 2019-06-27 PROCEDURE — 76210000006 HC OR PH I REC 0.5 TO 1 HR: Performed by: UROLOGY

## 2019-06-27 PROCEDURE — 77030020782 HC GWN BAIR PAWS FLX 3M -B: Performed by: ANESTHESIOLOGY

## 2019-06-27 PROCEDURE — 74011000250 HC RX REV CODE- 250: Performed by: ANESTHESIOLOGY

## 2019-06-27 PROCEDURE — 77030037088 HC TUBE ENDOTRACH ORAL NSL COVD-A: Performed by: ANESTHESIOLOGY

## 2019-06-27 PROCEDURE — 77030034628 HC LIGASURE LAP SEAL DIV MD COVD -F: Performed by: UROLOGY

## 2019-06-27 PROCEDURE — 77030011450 HC HNDPC AR BM COVD -B: Performed by: UROLOGY

## 2019-06-27 PROCEDURE — 77030008756 HC TU IRR SUC STRY -B: Performed by: UROLOGY

## 2019-06-27 PROCEDURE — 77030035048 HC TRCR ENDOSC OPTCL COVD -B: Performed by: UROLOGY

## 2019-06-27 PROCEDURE — 74011250636 HC RX REV CODE- 250/636

## 2019-06-27 PROCEDURE — 77030012406 HC DRN WND PENRS BARD -A: Performed by: UROLOGY

## 2019-06-27 PROCEDURE — 77030005401 HC CATH RAD ARRO -A: Performed by: ANESTHESIOLOGY

## 2019-06-27 PROCEDURE — 76010000174 HC OR TIME 3.5 TO 4 HR INTENSV-TIER 1: Performed by: UROLOGY

## 2019-06-27 PROCEDURE — 77030019908 HC STETH ESOPH SIMS -A: Performed by: ANESTHESIOLOGY

## 2019-06-27 PROCEDURE — 85018 HEMOGLOBIN: CPT

## 2019-06-27 PROCEDURE — 77030002966 HC SUT PDS J&J -A: Performed by: UROLOGY

## 2019-06-27 PROCEDURE — 74011250637 HC RX REV CODE- 250/637: Performed by: UROLOGY

## 2019-06-27 PROCEDURE — 77030014008 HC SPNG HEMSTAT J&J -C: Performed by: UROLOGY

## 2019-06-27 PROCEDURE — 77030034850: Performed by: UROLOGY

## 2019-06-27 PROCEDURE — 77030014007 HC SPNG HEMSTAT J&J -B: Performed by: UROLOGY

## 2019-06-27 PROCEDURE — 77030020263 HC SOL INJ SOD CL0.9% LFCR 1000ML: Performed by: UROLOGY

## 2019-06-27 PROCEDURE — 77030008606 HC TRCR ENDOSC KII AMR -B: Performed by: UROLOGY

## 2019-06-27 PROCEDURE — 82962 GLUCOSE BLOOD TEST: CPT

## 2019-06-27 PROCEDURE — 77030031139 HC SUT VCRL2 J&J -A: Performed by: UROLOGY

## 2019-06-27 PROCEDURE — 74011250637 HC RX REV CODE- 250/637: Performed by: ANESTHESIOLOGY

## 2019-06-27 PROCEDURE — 74011000250 HC RX REV CODE- 250

## 2019-06-27 PROCEDURE — 77030002896 HC SUT CLP ABSRB J&J -B: Performed by: UROLOGY

## 2019-06-27 PROCEDURE — 77030014650 HC SEAL MTRX FLOSEL BAXT -C: Performed by: UROLOGY

## 2019-06-27 PROCEDURE — 74011000250 HC RX REV CODE- 250: Performed by: UROLOGY

## 2019-06-27 RX ORDER — LIDOCAINE HYDROCHLORIDE 10 MG/ML
0.1 INJECTION INFILTRATION; PERINEURAL AS NEEDED
Status: DISCONTINUED | OUTPATIENT
Start: 2019-06-27 | End: 2019-06-27 | Stop reason: HOSPADM

## 2019-06-27 RX ORDER — BUPROPION HYDROCHLORIDE 150 MG/1
150 TABLET, EXTENDED RELEASE ORAL 2 TIMES DAILY
Status: DISCONTINUED | OUTPATIENT
Start: 2019-06-27 | End: 2019-07-03 | Stop reason: HOSPADM

## 2019-06-27 RX ORDER — KETAMINE HYDROCHLORIDE 100 MG/ML
INJECTION, SOLUTION INTRAMUSCULAR; INTRAVENOUS AS NEEDED
Status: DISCONTINUED | OUTPATIENT
Start: 2019-06-27 | End: 2019-06-27 | Stop reason: HOSPADM

## 2019-06-27 RX ORDER — OXYCODONE HYDROCHLORIDE 5 MG/1
10 TABLET ORAL
Status: COMPLETED | OUTPATIENT
Start: 2019-06-27 | End: 2019-06-27

## 2019-06-27 RX ORDER — SODIUM CHLORIDE, SODIUM LACTATE, POTASSIUM CHLORIDE, CALCIUM CHLORIDE 600; 310; 30; 20 MG/100ML; MG/100ML; MG/100ML; MG/100ML
100 INJECTION, SOLUTION INTRAVENOUS CONTINUOUS
Status: DISCONTINUED | OUTPATIENT
Start: 2019-06-27 | End: 2019-06-27 | Stop reason: HOSPADM

## 2019-06-27 RX ORDER — LEVOFLOXACIN 5 MG/ML
500 INJECTION, SOLUTION INTRAVENOUS EVERY 24 HOURS
Status: COMPLETED | OUTPATIENT
Start: 2019-06-27 | End: 2019-06-28

## 2019-06-27 RX ORDER — FENTANYL CITRATE 50 UG/ML
INJECTION, SOLUTION INTRAMUSCULAR; INTRAVENOUS AS NEEDED
Status: DISCONTINUED | OUTPATIENT
Start: 2019-06-27 | End: 2019-06-27 | Stop reason: HOSPADM

## 2019-06-27 RX ORDER — HYDROMORPHONE HYDROCHLORIDE 1 MG/ML
1 INJECTION, SOLUTION INTRAMUSCULAR; INTRAVENOUS; SUBCUTANEOUS
Status: DISCONTINUED | OUTPATIENT
Start: 2019-06-27 | End: 2019-07-03 | Stop reason: HOSPADM

## 2019-06-27 RX ORDER — PANTOPRAZOLE SODIUM 40 MG/1
40 TABLET, DELAYED RELEASE ORAL
Status: DISCONTINUED | OUTPATIENT
Start: 2019-06-27 | End: 2019-07-03 | Stop reason: HOSPADM

## 2019-06-27 RX ORDER — NALOXONE HYDROCHLORIDE 0.4 MG/ML
0.2 INJECTION, SOLUTION INTRAMUSCULAR; INTRAVENOUS; SUBCUTANEOUS AS NEEDED
Status: DISCONTINUED | OUTPATIENT
Start: 2019-06-27 | End: 2019-06-27 | Stop reason: HOSPADM

## 2019-06-27 RX ORDER — INSULIN LISPRO 100 [IU]/ML
INJECTION, SOLUTION INTRAVENOUS; SUBCUTANEOUS
Status: DISCONTINUED | OUTPATIENT
Start: 2019-06-27 | End: 2019-07-03 | Stop reason: HOSPADM

## 2019-06-27 RX ORDER — SODIUM CHLORIDE, SODIUM LACTATE, POTASSIUM CHLORIDE, CALCIUM CHLORIDE 600; 310; 30; 20 MG/100ML; MG/100ML; MG/100ML; MG/100ML
INJECTION, SOLUTION INTRAVENOUS
Status: DISCONTINUED | OUTPATIENT
Start: 2019-06-27 | End: 2019-06-27 | Stop reason: HOSPADM

## 2019-06-27 RX ORDER — SODIUM CHLORIDE 9 MG/ML
250 INJECTION, SOLUTION INTRAVENOUS AS NEEDED
Status: DISCONTINUED | OUTPATIENT
Start: 2019-06-27 | End: 2019-06-27 | Stop reason: HOSPADM

## 2019-06-27 RX ORDER — ADHESIVE BANDAGE
30 BANDAGE TOPICAL DAILY PRN
Status: DISCONTINUED | OUTPATIENT
Start: 2019-06-27 | End: 2019-07-03 | Stop reason: HOSPADM

## 2019-06-27 RX ORDER — VALSARTAN 320 MG/1
320 TABLET ORAL DAILY
Status: DISCONTINUED | OUTPATIENT
Start: 2019-06-27 | End: 2019-07-03 | Stop reason: HOSPADM

## 2019-06-27 RX ORDER — SODIUM CHLORIDE 0.9 % (FLUSH) 0.9 %
5-40 SYRINGE (ML) INJECTION AS NEEDED
Status: DISCONTINUED | OUTPATIENT
Start: 2019-06-27 | End: 2019-07-03 | Stop reason: HOSPADM

## 2019-06-27 RX ORDER — ROCURONIUM BROMIDE 10 MG/ML
INJECTION, SOLUTION INTRAVENOUS AS NEEDED
Status: DISCONTINUED | OUTPATIENT
Start: 2019-06-27 | End: 2019-06-27 | Stop reason: HOSPADM

## 2019-06-27 RX ORDER — FENTANYL CITRATE 50 UG/ML
100 INJECTION, SOLUTION INTRAMUSCULAR; INTRAVENOUS ONCE
Status: DISCONTINUED | OUTPATIENT
Start: 2019-06-27 | End: 2019-06-27 | Stop reason: HOSPADM

## 2019-06-27 RX ORDER — ACETAMINOPHEN 325 MG/1
650 TABLET ORAL
Status: DISCONTINUED | OUTPATIENT
Start: 2019-06-27 | End: 2019-07-03 | Stop reason: HOSPADM

## 2019-06-27 RX ORDER — NALOXONE HYDROCHLORIDE 0.4 MG/ML
0.4 INJECTION, SOLUTION INTRAMUSCULAR; INTRAVENOUS; SUBCUTANEOUS AS NEEDED
Status: DISCONTINUED | OUTPATIENT
Start: 2019-06-27 | End: 2019-07-03 | Stop reason: HOSPADM

## 2019-06-27 RX ORDER — FLUMAZENIL 0.1 MG/ML
0.2 INJECTION INTRAVENOUS
Status: DISCONTINUED | OUTPATIENT
Start: 2019-06-27 | End: 2019-06-27 | Stop reason: HOSPADM

## 2019-06-27 RX ORDER — HYDROMORPHONE HYDROCHLORIDE 2 MG/ML
0.5 INJECTION, SOLUTION INTRAMUSCULAR; INTRAVENOUS; SUBCUTANEOUS
Status: DISCONTINUED | OUTPATIENT
Start: 2019-06-27 | End: 2019-06-27 | Stop reason: HOSPADM

## 2019-06-27 RX ORDER — ONDANSETRON 2 MG/ML
4 INJECTION INTRAMUSCULAR; INTRAVENOUS
Status: DISCONTINUED | OUTPATIENT
Start: 2019-06-27 | End: 2019-07-03 | Stop reason: HOSPADM

## 2019-06-27 RX ORDER — SODIUM CHLORIDE 0.9 % (FLUSH) 0.9 %
5-40 SYRINGE (ML) INJECTION AS NEEDED
Status: DISCONTINUED | OUTPATIENT
Start: 2019-06-27 | End: 2019-06-27 | Stop reason: HOSPADM

## 2019-06-27 RX ORDER — MIDAZOLAM HYDROCHLORIDE 1 MG/ML
2 INJECTION, SOLUTION INTRAMUSCULAR; INTRAVENOUS
Status: COMPLETED | OUTPATIENT
Start: 2019-06-27 | End: 2019-06-27

## 2019-06-27 RX ORDER — MANNITOL 250 MG/ML
INJECTION, SOLUTION INTRAVENOUS AS NEEDED
Status: DISCONTINUED | OUTPATIENT
Start: 2019-06-27 | End: 2019-06-27 | Stop reason: HOSPADM

## 2019-06-27 RX ORDER — SODIUM CHLORIDE 0.9 % (FLUSH) 0.9 %
5-40 SYRINGE (ML) INJECTION EVERY 8 HOURS
Status: DISCONTINUED | OUTPATIENT
Start: 2019-06-27 | End: 2019-06-27 | Stop reason: HOSPADM

## 2019-06-27 RX ORDER — PROPOFOL 10 MG/ML
INJECTION, EMULSION INTRAVENOUS AS NEEDED
Status: DISCONTINUED | OUTPATIENT
Start: 2019-06-27 | End: 2019-06-27 | Stop reason: HOSPADM

## 2019-06-27 RX ORDER — ACETAMINOPHEN 500 MG
1000 TABLET ORAL ONCE
Status: COMPLETED | OUTPATIENT
Start: 2019-06-27 | End: 2019-06-27

## 2019-06-27 RX ORDER — CIPROFLOXACIN 2 MG/ML
400 INJECTION, SOLUTION INTRAVENOUS
Status: COMPLETED | OUTPATIENT
Start: 2019-06-27 | End: 2019-06-27

## 2019-06-27 RX ORDER — EPHEDRINE SULFATE 50 MG/ML
INJECTION, SOLUTION INTRAVENOUS AS NEEDED
Status: DISCONTINUED | OUTPATIENT
Start: 2019-06-27 | End: 2019-06-27 | Stop reason: HOSPADM

## 2019-06-27 RX ORDER — SODIUM CHLORIDE 0.9 % (FLUSH) 0.9 %
5-40 SYRINGE (ML) INJECTION EVERY 8 HOURS
Status: DISCONTINUED | OUTPATIENT
Start: 2019-06-27 | End: 2019-07-03 | Stop reason: HOSPADM

## 2019-06-27 RX ORDER — DIPHENHYDRAMINE HYDROCHLORIDE 50 MG/ML
12.5 INJECTION, SOLUTION INTRAMUSCULAR; INTRAVENOUS
Status: DISCONTINUED | OUTPATIENT
Start: 2019-06-27 | End: 2019-06-27 | Stop reason: HOSPADM

## 2019-06-27 RX ORDER — LIDOCAINE HYDROCHLORIDE 20 MG/ML
INJECTION, SOLUTION EPIDURAL; INFILTRATION; INTRACAUDAL; PERINEURAL AS NEEDED
Status: DISCONTINUED | OUTPATIENT
Start: 2019-06-27 | End: 2019-06-27 | Stop reason: HOSPADM

## 2019-06-27 RX ORDER — MIDAZOLAM HYDROCHLORIDE 1 MG/ML
2 INJECTION, SOLUTION INTRAMUSCULAR; INTRAVENOUS ONCE
Status: DISCONTINUED | OUTPATIENT
Start: 2019-06-27 | End: 2019-06-27 | Stop reason: HOSPADM

## 2019-06-27 RX ORDER — HYDROCODONE BITARTRATE AND ACETAMINOPHEN 5; 325 MG/1; MG/1
1 TABLET ORAL
Status: DISCONTINUED | OUTPATIENT
Start: 2019-06-27 | End: 2019-07-03 | Stop reason: HOSPADM

## 2019-06-27 RX ORDER — OXYCODONE HYDROCHLORIDE 5 MG/1
5 TABLET ORAL
Status: DISCONTINUED | OUTPATIENT
Start: 2019-06-27 | End: 2019-06-27 | Stop reason: HOSPADM

## 2019-06-27 RX ORDER — DEXAMETHASONE SODIUM PHOSPHATE 4 MG/ML
INJECTION, SOLUTION INTRA-ARTICULAR; INTRALESIONAL; INTRAMUSCULAR; INTRAVENOUS; SOFT TISSUE AS NEEDED
Status: DISCONTINUED | OUTPATIENT
Start: 2019-06-27 | End: 2019-06-27 | Stop reason: HOSPADM

## 2019-06-27 RX ORDER — SODIUM CHLORIDE 9 MG/ML
100 INJECTION, SOLUTION INTRAVENOUS CONTINUOUS
Status: DISCONTINUED | OUTPATIENT
Start: 2019-06-27 | End: 2019-07-02

## 2019-06-27 RX ORDER — HYDROCHLOROTHIAZIDE 12.5 MG/1
12.5 CAPSULE ORAL DAILY
Status: DISCONTINUED | OUTPATIENT
Start: 2019-06-27 | End: 2019-07-03 | Stop reason: HOSPADM

## 2019-06-27 RX ORDER — BUPIVACAINE HYDROCHLORIDE AND EPINEPHRINE 5; 5 MG/ML; UG/ML
INJECTION, SOLUTION EPIDURAL; INTRACAUDAL; PERINEURAL AS NEEDED
Status: DISCONTINUED | OUTPATIENT
Start: 2019-06-27 | End: 2019-06-27 | Stop reason: HOSPADM

## 2019-06-27 RX ADMIN — EPHEDRINE SULFATE 10 MG: 50 INJECTION, SOLUTION INTRAVENOUS at 12:21

## 2019-06-27 RX ADMIN — SODIUM CHLORIDE 100 ML/HR: 900 INJECTION, SOLUTION INTRAVENOUS at 17:41

## 2019-06-27 RX ADMIN — SODIUM CHLORIDE, SODIUM LACTATE, POTASSIUM CHLORIDE, CALCIUM CHLORIDE: 600; 310; 30; 20 INJECTION, SOLUTION INTRAVENOUS at 11:35

## 2019-06-27 RX ADMIN — SODIUM CHLORIDE, SODIUM LACTATE, POTASSIUM CHLORIDE, AND CALCIUM CHLORIDE: 600; 310; 30; 20 INJECTION, SOLUTION INTRAVENOUS at 13:26

## 2019-06-27 RX ADMIN — MANNITOL 12.5 G: 250 INJECTION, SOLUTION INTRAVENOUS at 13:44

## 2019-06-27 RX ADMIN — HYDROCHLOROTHIAZIDE 12.5 MG: 12.5 CAPSULE ORAL at 21:02

## 2019-06-27 RX ADMIN — KETAMINE HYDROCHLORIDE 25 MG: 100 INJECTION, SOLUTION INTRAMUSCULAR; INTRAVENOUS at 12:04

## 2019-06-27 RX ADMIN — LIDOCAINE HYDROCHLORIDE 30 MG: 20 INJECTION, SOLUTION EPIDURAL; INFILTRATION; INTRACAUDAL; PERINEURAL at 11:17

## 2019-06-27 RX ADMIN — HYDROMORPHONE HYDROCHLORIDE 0.5 MG: 2 INJECTION INTRAMUSCULAR; INTRAVENOUS; SUBCUTANEOUS at 15:43

## 2019-06-27 RX ADMIN — FENTANYL CITRATE 50 MCG: 50 INJECTION, SOLUTION INTRAMUSCULAR; INTRAVENOUS at 12:01

## 2019-06-27 RX ADMIN — DEXAMETHASONE SODIUM PHOSPHATE 4 MG: 4 INJECTION, SOLUTION INTRA-ARTICULAR; INTRALESIONAL; INTRAMUSCULAR; INTRAVENOUS; SOFT TISSUE at 12:00

## 2019-06-27 RX ADMIN — EPHEDRINE SULFATE 5 MG: 50 INJECTION, SOLUTION INTRAVENOUS at 14:06

## 2019-06-27 RX ADMIN — BUPROPION HYDROCHLORIDE 150 MG: 150 TABLET, EXTENDED RELEASE ORAL at 21:02

## 2019-06-27 RX ADMIN — ROCURONIUM BROMIDE 10 MG: 10 INJECTION, SOLUTION INTRAVENOUS at 13:10

## 2019-06-27 RX ADMIN — VALSARTAN 320 MG: 320 TABLET, FILM COATED ORAL at 21:02

## 2019-06-27 RX ADMIN — EPHEDRINE SULFATE 10 MG: 50 INJECTION, SOLUTION INTRAVENOUS at 13:59

## 2019-06-27 RX ADMIN — KETAMINE HYDROCHLORIDE 10 MG: 100 INJECTION, SOLUTION INTRAMUSCULAR; INTRAVENOUS at 13:00

## 2019-06-27 RX ADMIN — Medication 10 ML: at 17:42

## 2019-06-27 RX ADMIN — ACETAMINOPHEN 1000 MG: 500 TABLET, FILM COATED ORAL at 15:21

## 2019-06-27 RX ADMIN — OXYCODONE HYDROCHLORIDE 10 MG: 5 TABLET ORAL at 15:14

## 2019-06-27 RX ADMIN — FENTANYL CITRATE 50 MCG: 50 INJECTION, SOLUTION INTRAMUSCULAR; INTRAVENOUS at 11:17

## 2019-06-27 RX ADMIN — PANTOPRAZOLE SODIUM 40 MG: 40 TABLET, DELAYED RELEASE ORAL at 21:02

## 2019-06-27 RX ADMIN — EPHEDRINE SULFATE 10 MG: 50 INJECTION, SOLUTION INTRAVENOUS at 13:18

## 2019-06-27 RX ADMIN — FENTANYL CITRATE 25 MCG: 50 INJECTION, SOLUTION INTRAMUSCULAR; INTRAVENOUS at 11:55

## 2019-06-27 RX ADMIN — HYDROMORPHONE HYDROCHLORIDE 0.5 MG: 2 INJECTION INTRAMUSCULAR; INTRAVENOUS; SUBCUTANEOUS at 15:14

## 2019-06-27 RX ADMIN — LEVOFLOXACIN 500 MG: 5 INJECTION, SOLUTION INTRAVENOUS at 23:00

## 2019-06-27 RX ADMIN — EPHEDRINE SULFATE 10 MG: 50 INJECTION, SOLUTION INTRAVENOUS at 13:43

## 2019-06-27 RX ADMIN — PROPOFOL 200 MG: 10 INJECTION, EMULSION INTRAVENOUS at 11:17

## 2019-06-27 RX ADMIN — ROCURONIUM BROMIDE 10 MG: 10 INJECTION, SOLUTION INTRAVENOUS at 12:07

## 2019-06-27 RX ADMIN — SODIUM CHLORIDE, SODIUM LACTATE, POTASSIUM CHLORIDE, AND CALCIUM CHLORIDE 100 ML/HR: 600; 310; 30; 20 INJECTION, SOLUTION INTRAVENOUS at 10:39

## 2019-06-27 RX ADMIN — CIPROFLOXACIN 400 MG: 2 INJECTION, SOLUTION INTRAVENOUS at 11:45

## 2019-06-27 RX ADMIN — ROCURONIUM BROMIDE 5 MG: 10 INJECTION, SOLUTION INTRAVENOUS at 14:09

## 2019-06-27 RX ADMIN — HYDROMORPHONE HYDROCHLORIDE 0.5 MG: 2 INJECTION INTRAMUSCULAR; INTRAVENOUS; SUBCUTANEOUS at 15:30

## 2019-06-27 RX ADMIN — FENTANYL CITRATE 50 MCG: 50 INJECTION, SOLUTION INTRAMUSCULAR; INTRAVENOUS at 11:30

## 2019-06-27 RX ADMIN — KETAMINE HYDROCHLORIDE 10 MG: 100 INJECTION, SOLUTION INTRAMUSCULAR; INTRAVENOUS at 14:00

## 2019-06-27 RX ADMIN — FENTANYL CITRATE 25 MCG: 50 INJECTION, SOLUTION INTRAMUSCULAR; INTRAVENOUS at 11:52

## 2019-06-27 RX ADMIN — EPHEDRINE SULFATE 5 MG: 50 INJECTION, SOLUTION INTRAVENOUS at 13:09

## 2019-06-27 RX ADMIN — MANNITOL 12.5 G: 250 INJECTION, SOLUTION INTRAVENOUS at 14:14

## 2019-06-27 RX ADMIN — ROCURONIUM BROMIDE 40 MG: 10 INJECTION, SOLUTION INTRAVENOUS at 11:17

## 2019-06-27 RX ADMIN — PROMETHAZINE HYDROCHLORIDE 6.25 MG: 25 INJECTION INTRAMUSCULAR; INTRAVENOUS at 15:30

## 2019-06-27 RX ADMIN — SODIUM CHLORIDE, SODIUM LACTATE, POTASSIUM CHLORIDE, AND CALCIUM CHLORIDE: 600; 310; 30; 20 INJECTION, SOLUTION INTRAVENOUS at 11:03

## 2019-06-27 RX ADMIN — Medication 10 ML: at 21:01

## 2019-06-27 RX ADMIN — MIDAZOLAM HYDROCHLORIDE 2 MG: 2 INJECTION, SOLUTION INTRAMUSCULAR; INTRAVENOUS at 10:38

## 2019-06-27 NOTE — ANESTHESIA PREPROCEDURE EVALUATION
Relevant Problems   No relevant active problems       Anesthetic History          Comments: Wakes up combative     Review of Systems / Medical History  Patient summary reviewed and pertinent labs reviewed    Pulmonary  Within defined limits                 Neuro/Psych   Within defined limits           Cardiovascular    Hypertension              Exercise tolerance: >4 METS     GI/Hepatic/Renal     GERD: well controlled           Endo/Other    Diabetes: well controlled, type 2, using insulin         Other Findings              Physical Exam    Airway  Mallampati: III  TM Distance: 4 - 6 cm  Neck ROM: decreased range of motion   Mouth opening: Normal     Cardiovascular    Rhythm: regular  Rate: normal         Dental         Pulmonary  Breath sounds clear to auscultation               Abdominal         Other Findings            Anesthetic Plan    ASA: 2  Anesthesia type: general    Monitoring Plan: Arterial line      Induction: Intravenous  Anesthetic plan and risks discussed with: Patient and Son / Daughter

## 2019-06-27 NOTE — BRIEF OP NOTE
BRIEF OPERATIVE NOTE    Date of Procedure: 6/27/2019   Preoperative Diagnosis: Renal mass [N28.89]  Postoperative Diagnosis: Renal mass [N28.89]    Procedure(s):  RIGHT NEPHRECTOMY PARTIAL LAPAROSCOPIC HAND ASSISTED  Surgeon(s) and Role:     * Tank Casas MD - Primary     * Lisa Orta MD - Assisting         Surgical Assistant:  Vesna    Surgical Staff:  Circ-1: Carita Siemens, RN  Circ-2: Claudia Causey RN  Circ-Relief: Bridger Lozano RN  Scrub Tech-1: Frandy Soto  Scrub Tech-Relief: ePtra Raya Time In Time Out   Incision Start 1145    Incision Close 1435      Anesthesia: General   Estimated Blood Loss: 150 ml  Specimens:   ID Type Source Tests Collected by Time Destination   1 : Right renal mass Preservative Kidney, Right  Tank Casas MD 6/27/2019 1353 Pathology      Findings: see op note   Complications: none  Implants: * No implants in log *

## 2019-06-27 NOTE — ANESTHESIA POSTPROCEDURE EVALUATION
Procedure(s):  RIGHT NEPHRECTOMY PARTIAL LAPAROSCOPIC HAND ASSISTED. general    Anesthesia Post Evaluation      Multimodal analgesia: multimodal analgesia used between 6 hours prior to anesthesia start to PACU discharge  Patient location during evaluation: PACU  Patient participation: complete - patient participated  Level of consciousness: awake and awake and alert  Pain management: adequate  Airway patency: patent  Anesthetic complications: no  Cardiovascular status: acceptable  Respiratory status: acceptable  Hydration status: acceptable  Post anesthesia nausea and vomiting:  controlled      Vitals Value Taken Time   /72 6/27/2019  4:06 PM   Temp 36.7 °C (98 °F) 6/27/2019  3:52 PM   Pulse 72 6/27/2019  4:09 PM   Resp 12 6/27/2019  3:52 PM   SpO2 100 % 6/27/2019  4:09 PM   Vitals shown include unvalidated device data.

## 2019-06-27 NOTE — ROUTINE PROCESS
TRANSFER - OUT REPORT: 
 
Verbal report given to Devon Avila RN on Sanjana Burnham  being transferred to [facility]note 3] [phone] for routine post - op Report consisted of patients Situation, Background, Assessment and  
Recommendations(SBAR). Information from the following report(s) SBAR, Procedure Summary, Intake/Output, MAR and Cardiac Rhythm NSR was reviewed with the receiving nurse. Lines:  
Peripheral IV 06/27/19 Posterior;Proximal;Right Forearm (Active) Site Assessment Clean, dry, & intact 6/27/2019  3:52 PM  
Phlebitis Assessment 0 6/27/2019  3:52 PM  
Infiltration Assessment 0 6/27/2019  3:52 PM  
Dressing Status Clean, dry, & intact 6/27/2019  3:52 PM  
Dressing Type Transparent;Tape 6/27/2019  3:52 PM  
Hub Color/Line Status Pink; Infusing 6/27/2019  3:52 PM  
   
Peripheral IV 06/27/19 Left Hand (Active) Site Assessment Clean, dry, & intact 6/27/2019  3:52 PM  
Phlebitis Assessment 0 6/27/2019  3:52 PM  
Infiltration Assessment 0 6/27/2019  3:52 PM  
Dressing Status Clean, dry, & intact 6/27/2019  3:52 PM  
Dressing Type Transparent;Tape 6/27/2019  3:52 PM  
Hub Color/Line Status Green; Infusing 6/27/2019  3:52 PM  
   
Arterial Line 06/27/19 Left Radial artery (Active) Site Assessment Clean, dry, & intact 6/27/2019  3:52 PM  
Dressing Status Clean, dry, & intact 6/27/2019  3:52 PM  
Dressing Type Transparent;Tape 6/27/2019  3:52 PM  
Line Status Discontinued (Catheter tip intact) 6/27/2019  3:52 PM  
Treatment Arm board on;Zeroed or re-zeroed 6/27/2019  3:01 PM  
Affected Extremity/Extremities Color distal to insertion site pink (or appropriate for race); Pulses palpable;Range of motion performed 6/27/2019  3:52 PM  
  
 
Opportunity for questions and clarification was provided. Patient transported with: 
 O2 @ 2 liters Tech 
 
VTE prophylaxis orders have been written for Sanjana Burnham. Patient and family given floor number and nurses name. Family updated re: pt status after security code verified.

## 2019-06-27 NOTE — PROGRESS NOTES
Pt very drowsy since arrival from surgery from anesthesia, family at bedside. Will continue to monitor pt and give report to oncoming nurse.

## 2019-06-27 NOTE — PROGRESS NOTES
TRANSFER - IN REPORT:    Verbal report received from Paulo Perkins RN(name) on Misty Sierra  being received from [employer]) for routine post - op      Report consisted of patients Situation, Background, Assessment and   Recommendations(SBAR). Information from the following report(s) SBAR was reviewed with the receiving nurse. Opportunity for questions and clarification was provided. Assessment completed upon patients arrival to unit and care assumed.

## 2019-06-27 NOTE — ANESTHESIA PROCEDURE NOTES
Arterial Line Placement    Start time: 6/27/2019 11:08 AM  End time: 6/27/2019 11:12 AM  Performed by: Nancy Myers MD  Authorized by: Nancy Myers MD     Pre-Procedure  Preanesthetic Checklist: patient identified, risks and benefits discussed, anesthesia consent, site marked, patient being monitored, timeout performed and patient being monitored      Procedure:   Prep:  ChloraPrep  Seldinger Technique?: Yes    Orientation:  Left  Location:  Radial artery  Catheter size:  20 G  Number of attempts:  1  Cont Cardiac Output Sensor: No      Assessment:   Post-procedure:  Line secured and sterile dressing applied  Patient Tolerance:  Patient tolerated the procedure well with no immediate complications

## 2019-06-27 NOTE — PROGRESS NOTES
Dual skin assessment performed with Los kaylee, RN. Pt had partial R nephrectomy: X3 lap sites. ALY drain and shields. No skin breakdown noted.

## 2019-06-28 LAB
ANION GAP SERPL CALC-SCNC: 10 MMOL/L (ref 7–16)
BUN SERPL-MCNC: 20 MG/DL (ref 8–23)
CALCIUM SERPL-MCNC: 7.7 MG/DL (ref 8.3–10.4)
CHLORIDE SERPL-SCNC: 103 MMOL/L (ref 98–107)
CO2 SERPL-SCNC: 26 MMOL/L (ref 21–32)
CREAT SERPL-MCNC: 1.23 MG/DL (ref 0.6–1)
GLUCOSE BLD STRIP.AUTO-MCNC: 103 MG/DL (ref 65–100)
GLUCOSE BLD STRIP.AUTO-MCNC: 106 MG/DL (ref 65–100)
GLUCOSE BLD STRIP.AUTO-MCNC: 133 MG/DL (ref 65–100)
GLUCOSE BLD STRIP.AUTO-MCNC: 159 MG/DL (ref 65–100)
GLUCOSE SERPL-MCNC: 196 MG/DL (ref 65–100)
HCT VFR BLD AUTO: 34.9 % (ref 35.8–46.3)
HGB BLD-MCNC: 11.4 G/DL (ref 11.7–15.4)
POTASSIUM SERPL-SCNC: 4 MMOL/L (ref 3.5–5.1)
SODIUM SERPL-SCNC: 139 MMOL/L (ref 136–145)

## 2019-06-28 PROCEDURE — 74011250636 HC RX REV CODE- 250/636: Performed by: NURSE PRACTITIONER

## 2019-06-28 PROCEDURE — 77030034849

## 2019-06-28 PROCEDURE — 85018 HEMOGLOBIN: CPT

## 2019-06-28 PROCEDURE — 80048 BASIC METABOLIC PNL TOTAL CA: CPT

## 2019-06-28 PROCEDURE — 74011250637 HC RX REV CODE- 250/637: Performed by: UROLOGY

## 2019-06-28 PROCEDURE — 82962 GLUCOSE BLOOD TEST: CPT

## 2019-06-28 PROCEDURE — 74011250636 HC RX REV CODE- 250/636: Performed by: UROLOGY

## 2019-06-28 PROCEDURE — 65270000029 HC RM PRIVATE

## 2019-06-28 PROCEDURE — 74011636637 HC RX REV CODE- 636/637: Performed by: UROLOGY

## 2019-06-28 PROCEDURE — 77030027138 HC INCENT SPIROMETER -A

## 2019-06-28 PROCEDURE — 77010033678 HC OXYGEN DAILY

## 2019-06-28 PROCEDURE — 94760 N-INVAS EAR/PLS OXIMETRY 1: CPT

## 2019-06-28 PROCEDURE — 36415 COLL VENOUS BLD VENIPUNCTURE: CPT

## 2019-06-28 PROCEDURE — 51798 US URINE CAPACITY MEASURE: CPT

## 2019-06-28 PROCEDURE — 77030020263 HC SOL INJ SOD CL0.9% LFCR 1000ML

## 2019-06-28 RX ADMIN — HYDROMORPHONE HYDROCHLORIDE 1 MG: 1 INJECTION, SOLUTION INTRAMUSCULAR; INTRAVENOUS; SUBCUTANEOUS at 18:30

## 2019-06-28 RX ADMIN — Medication 10 ML: at 14:00

## 2019-06-28 RX ADMIN — HYDROCODONE BITARTRATE AND ACETAMINOPHEN 1 TABLET: 5; 325 TABLET ORAL at 13:24

## 2019-06-28 RX ADMIN — BUPROPION HYDROCHLORIDE 150 MG: 150 TABLET, EXTENDED RELEASE ORAL at 17:02

## 2019-06-28 RX ADMIN — SODIUM CHLORIDE 500 ML: 900 INJECTION, SOLUTION INTRAVENOUS at 09:00

## 2019-06-28 RX ADMIN — Medication 10 ML: at 21:21

## 2019-06-28 RX ADMIN — HYDROMORPHONE HYDROCHLORIDE 1 MG: 1 INJECTION, SOLUTION INTRAMUSCULAR; INTRAVENOUS; SUBCUTANEOUS at 00:15

## 2019-06-28 RX ADMIN — HYDROCHLOROTHIAZIDE 12.5 MG: 12.5 CAPSULE ORAL at 08:29

## 2019-06-28 RX ADMIN — HYDROMORPHONE HYDROCHLORIDE 1 MG: 1 INJECTION, SOLUTION INTRAMUSCULAR; INTRAVENOUS; SUBCUTANEOUS at 08:10

## 2019-06-28 RX ADMIN — INSULIN LISPRO 2 UNITS: 100 INJECTION, SOLUTION INTRAVENOUS; SUBCUTANEOUS at 07:30

## 2019-06-28 RX ADMIN — HYDROMORPHONE HYDROCHLORIDE 1 MG: 1 INJECTION, SOLUTION INTRAMUSCULAR; INTRAVENOUS; SUBCUTANEOUS at 11:35

## 2019-06-28 RX ADMIN — Medication 10 ML: at 05:02

## 2019-06-28 RX ADMIN — HYDROCODONE BITARTRATE AND ACETAMINOPHEN 1 TABLET: 5; 325 TABLET ORAL at 09:30

## 2019-06-28 RX ADMIN — HYDROMORPHONE HYDROCHLORIDE 1 MG: 1 INJECTION, SOLUTION INTRAMUSCULAR; INTRAVENOUS; SUBCUTANEOUS at 22:17

## 2019-06-28 RX ADMIN — VALSARTAN 320 MG: 320 TABLET, FILM COATED ORAL at 08:29

## 2019-06-28 RX ADMIN — HYDROMORPHONE HYDROCHLORIDE 1 MG: 1 INJECTION, SOLUTION INTRAMUSCULAR; INTRAVENOUS; SUBCUTANEOUS at 14:50

## 2019-06-28 RX ADMIN — BUPROPION HYDROCHLORIDE 150 MG: 150 TABLET, EXTENDED RELEASE ORAL at 08:28

## 2019-06-28 RX ADMIN — PANTOPRAZOLE SODIUM 40 MG: 40 TABLET, DELAYED RELEASE ORAL at 05:02

## 2019-06-28 NOTE — PROGRESS NOTES
Date 06/27/19 0700 - 06/28/19 0659 06/28/19 0700 - 06/29/19 0659   Shift [id number] 1900-0659 24 Hour Total 9887-4191 2733-2022 24 Hour Total   INTAKE   P.O. 75  75        P. O. 75  75      I. V.(mL/kg/hr) 1950(3.1) 1129 3079        I.V. 150 1129 1279        Volume (lactated Ringers infusion) 1500  1500        Volume (lactated Ringers infusion) 300  300      Shift Total(mL/kg) 0525(42.7) 7264(98.7) 7067(54.8)      OUTPUT   Urine(mL/kg/hr) 350(0.5) 550 900        Urine Output 275  275        Urine Output (mL) (Urinary Catheter 06/27/19 2- way; Niño) 75 550 625      Drains 130 60 190        Output (ml) (Reji-Guillen Drain 06/27/19 Right Other (comment)) 130 60 190      Blood 150  150        Estimated Blood Loss 150  150      Shift Total(mL/kg) 630(11.9) 610(11.5) 1240(23.4)      NET 8639 064 9213      Weight (kg) 53.1 53.1 53.1 53.1 53.1 53.1     Hourly rounds done. Pt c/o pain, medicated per MAR. Denies nausea, vomiting. Niño output yellow/straw/hazy. ALY output sanguinous. Not passing gas, no BM. Hypoactive BM sounds. AO x4 w/ periodic confusion. Remains on 3 L O2. All needs met at this time.

## 2019-06-28 NOTE — PROGRESS NOTES
Admit Date: 6/27/2019    Subjective:     Endy Fuentes is doing well this morning. She has no complaints. She is unsure if she passed flatus. She has shields in place with clear yellow urine OP. ALY with serosanguinous OP. Abdomen soft. Objective:     Patient Vitals for the past 8 hrs:   BP Temp Pulse Resp SpO2   06/28/19 0758 138/64 98 °F (36.7 °C) 83 18 100 %   06/28/19 0349 138/49 97.9 °F (36.6 °C) 78 17 100 %     No intake/output data recorded.   06/26 1901 - 06/28 0700  In: 3154 [P.O.:75; I.V.:3079]  Out: 200 [Urine:900; Drains:190]    Physical Exam:  GENERAL: alert, cooperative, no distress  LUNG: clear to auscultation bilaterally  HEART: regular rate and rhythm, S1, S2   ABDOMEN: soft, non-tender, dressing c/d/i, ALY with serosanguinous OP  NEUROLOGIC: AOx3      Data Review   Recent Results (from the past 24 hour(s))   GLUCOSE, POC    Collection Time: 06/27/19  9:28 AM   Result Value Ref Range    Glucose (POC) 138 (H) 65 - 100 mg/dL   GLUCOSE, POC    Collection Time: 06/27/19  3:02 PM   Result Value Ref Range    Glucose (POC) 160 (H) 65 - 100 mg/dL   HGB & HCT    Collection Time: 06/27/19  3:20 PM   Result Value Ref Range    HGB 11.6 (L) 11.7 - 15.4 g/dL    HCT 34.6 (L) 35.8 - 46.3 %   GLUCOSE, POC    Collection Time: 06/27/19  8:34 PM   Result Value Ref Range    Glucose (POC) 185 (H) 65 - 505 mg/dL   METABOLIC PANEL, BASIC    Collection Time: 06/28/19  3:10 AM   Result Value Ref Range    Sodium 139 136 - 145 mmol/L    Potassium 4.0 3.5 - 5.1 mmol/L    Chloride 103 98 - 107 mmol/L    CO2 26 21 - 32 mmol/L    Anion gap 10 7 - 16 mmol/L    Glucose 196 (H) 65 - 100 mg/dL    BUN 20 8 - 23 MG/DL    Creatinine 1.23 (H) 0.6 - 1.0 MG/DL    GFR est AA 55 (L) >60 ml/min/1.73m2    GFR est non-AA 45 (L) >60 ml/min/1.73m2    Calcium 7.7 (L) 8.3 - 10.4 MG/DL   HGB & HCT    Collection Time: 06/28/19  3:10 AM   Result Value Ref Range    HGB 11.4 (L) 11.7 - 15.4 g/dL    HCT 34.9 (L) 35.8 - 46.3 %   GLUCOSE, POC Collection Time: 06/28/19  6:53 AM   Result Value Ref Range    Glucose (POC) 159 (H) 65 - 100 mg/dL       Assessment:     Active Problems:    Renal mass, right (6/27/2019)      POD 1:    POSTOPERATIVE DIAGNOSIS:  Right renal mass.     PROCEDURE PERFORMED:  Right hand-assisted laparoscopic partial nephrectomy.       Cn 1.23  Hgb 11.4    Afebrile, VSS      Plan:     Give 500 ml NS bolus x 1. Continue NPO. Advance diet to clear liquids once passing flatus. Ambulate with PT. Goal is to get up to chair today. Remove shields catheter. Continue ALY drain. Encourage incentive spirometer use. [address], NP  [facility]  PE: abdomen soft, nontender  I have reviewed the above note and examined the patient. I agree with the exam, assessment and plan.     Christopher Albright MD

## 2019-06-28 NOTE — OP NOTES
[address] [facility]  OPERATIVE REPORT    Name:  Ute Cotton  MR#:  [id number]  :  1940  ACCOUNT #:  [de-identified]  DATE OF SERVICE:  2019      PREOPERATIVE DIAGNOSIS:  Right renal mass. POSTOPERATIVE DIAGNOSIS:  Right renal mass. PROCEDURE PERFORMED:  Right hand-assisted laparoscopic partial nephrectomy. SURGEON:  Bel Muse MD    ASSISTANT:  Britton Rowland MD    ANESTHESIA:  General.    COMPLICATIONS:  None. SPECIMENS REMOVED:  Right renal mass. IMPLANTS:  None. ESTIMATED BLOOD LOSS:  150 mL. FINDINGS:  1. Partially exophytic mass of the right posterior superior kidney measuring 2 cm. 2.  Kidney adherent to the posterior body wall due to previous open stone surgery. DESCRIPTION OF PROCEDURE:  The patient was given general anesthetic. She was placed on the beanbag in the supine position. A Niño catheter was placed. She was then placed in the right flank anterior position with the right arm on an armboard and left axillary roll. She was secured to the table with tape and straps, prepped and draped in sterile fashion on upper abdomen. A diagonal incision was made in the right lower quadrant to accommodate the GelPort hand port device. The muscle was divided using the Bovie and the posterior sheath was opened. There were some adhesions from the omentum to the anterior body wall. Some of these were lysed sharply under vision through the incision. Eventually the hand port was placed and pneumoperitoneum was achieved with CO2 at 15 mmHg. A working port was placed 12-mm port just lateral on the right side to the umbilicus. We then also placed a camera port in the right upper quadrant just to the right of the midline. We did encounter some adhesions between the liver and the anterior body wall, likely due to previous cholecystectomy. These were lysed under vision using the LigaSure [place] device.     We were finally able to turn attention to the kidney. The colon was identified and was swept medially. The line of Toldt on the right was opened. I was able to palpate the inferior pole of the right kidney. A liver retractor was placed with a 5-mm port in the right upper quadrant. I was able to identify the right ureter and developed a plane between the kidney and the vena cava. I encountered the renal vein and there were two separate renal arteries, one just posterior to the right renal vein and one inferior. Attempt was made to free up the kidney. We encountered a lot of scar tissue between the superior portion and posterior portion of the right kidney to the body wall from the previous flank incision. These were lysed using the LigaSure [place] device. There was a large simple cyst in the anterior portion of the superior pole of the right kidney. This was dissected and we opened it and drained it to afford us better visualization. A plane was developed between the right adrenal and the kidney. There was also a separate renal artery supplying the right upper pole, which made for a total of 3 right renal arteries. Finally we were able to flip the kidney medially and visualized the posterior portion of the right superior pole. We were able to visualize a partially exophytic mass. It was only partially exophytic probably about 0.5 mm protruding from the kidney. The arteries were then all clamped with Bulldog clamps after giving mannitol 12.5 g.  We then clamped the renal vein as well. A circumferential incision was made with scissors around the mass. There was some bleeding noted. We were able to remove the entire mass and send it to pathology. Argon laser was used to cauterize the renal parenchyma. We used a bolster of rolled up Surgicel. The Lapra-Ty device was used to close the renal capsule over the bolster using 2-0 Vicryl suture.     At this point, we removed the renal vein Bulldog clamp and then removed all the renal artery Bulldog clamps. There was no significant bleeding from the biopsy site. The mass itself did appear to be partially cystic. We placed some FloSeal around the biopsy site. Hemostasis appeared good. We decreased the pressure to 5 and there was no significant bleeding from the hilum or the biopsy site. Kidney was pexed laterally to the body wall using another 2-0 Vicryl with Lapra-Ty. The ureter as noted was visualized early in the case and was not damaged. At the end of the case, I did not see any evidence of bowel or liver injury or bleeding. A drain was placed through the 5-mm port site to drain the renal bed. The laparoscopic sites were closed using a port closure device with 2-0 Vicryl. The muscle of the hand port was  closed using running double-stranded two #1 PDS. Staples were used on the skin incisions. It should be noted that we injected Marcaine into the right lower quadrant incision. She tolerated this well. She will be taken to the recovery room in stable condition.       MD PEDRO [id number]  D:  06/27/2019 15:09  T:  06/27/2019 15:18  JOB #:  [id number]

## 2019-06-28 NOTE — PROGRESS NOTES
Problem: Pain  Goal: *Control of Pain  Outcome: Progressing Towards Goal     Problem: Falls - Risk of  Goal: *Absence of Falls  Description  Document Annabella Cousin Fall Risk and appropriate interventions in the flowsheet.   Outcome: Progressing Towards Goal

## 2019-06-28 NOTE — PROGRESS NOTES
Initial visit to assess spiritual needs. Pt was receptive to pastoral visit.  assured pt of continual support from the spiritual care team.       HEBER Taveras,Div.

## 2019-06-28 NOTE — OP NOTES
[address] [facility]  OPERATIVE REPORT    Name:  Dash Land  MR#:  [id number]  :  1940  ACCOUNT #:  [de-identified]  DATE OF SERVICE:  2019      PREOPERATIVE DIAGNOSIS:  Right renal mass. POSTOPERATIVE DIAGNOSIS:  Right renal mass. PROCEDURE PERFORMED:  Right laparoscopic hand-assisted partial nephrectomy. SURGEON:  Mariana Siddiqi MD    ASSISTANT:  Pauline Medeiros MD    ANESTHESIA:  General.    COMPLICATIONS:  None. SPECIMENS REMOVED:  Right renal mass sent for permanent pathology. IMPLANTS:  None. ESTIMATED BLOOD LOSS:  150 mL. FINDINGS:  See operative note. INDICATIONS FOR OPERATIVE PROCEDURE:  The patient is a 70-year-old female with an enhancing 2-cm upper pole right renal mass concerning for renal cell carcinoma. She was counseled on surgical options and opted to proceed with hand-assisted right laparoscopic partial nephrectomy today. DESCRIPTION OF OPERATIVE PROCEDURE:  After informed consent was obtained and the correct patient was identified in the preoperative holding area, she was taken back to the operating suite and placed on table in supine position. Time-out was performed confirming the correct patient and planned procedure. She received 2 g of IV Ancef prior to smooth induction of general endotracheal anesthesia. She was then moved into the left lateral decubitus position and with the right side up and prepped and draped in the usual sterile fashion. I assisted Dr. Irma Pond throughout the case in obtaining access to the abdomen, insufflating the abdomen, inserting ports in the abdomen, as well as taking down the patient's adhesions, mobilizing the kidney, identifying and mobilizing the hilum, retracting the liver, mobilizing Gerota's fascia, identifying margins of the right renal mass, providing visualization throughout the resection of the right renal mass as well as closure, clamping and unclamping of the hilum.   Please see Dr. Sima Ivan dictation for further details regarding the specifics of the case. I also assisted him at the end with the closure of the fascia as well as the skin. At the end of the case, the patient was awoken from anesthesia, transferred to PACU in stable condition. She tolerated the procedure well. There were no complications. All counts were correct at the end of the procedure.     Pastor Marium MD      [id number]  D:  06/27/2019 15:02  T:  06/27/2019 15:10  JOB #:  [id number]

## 2019-06-28 NOTE — PROGRESS NOTES
Pt resting quietly in bed. NO s/s of distress. Pt has family at bedside. Pt has persistent pain to back/surgical area. Pt given PRN pain medications. Pt was confused during AM.  Pts level of awareness has increased. Occasionally needs to be reoriented to place. Pt becomes uncomfortable when pain medication wears off. Instructed pt to stay in bed for tonight. ALY drain has minimal output. Pt's comfort level increases with pain medication. NS infusing. Niño d/c'd. Pt has not voided yet but is aware she needs to call when getting out of bed. Bed alarm continues to be activated.

## 2019-06-28 NOTE — PROGRESS NOTES
PT Note:  PT orders received/reviewed and evaluation attempted. RN request PT hold this AM due to confusion. Evaluation will be re-attempted as schedule and patient's status allow.   Thanks,  Nathaniel Brown, PT, DPT

## 2019-06-29 LAB
ANION GAP SERPL CALC-SCNC: 13 MMOL/L (ref 7–16)
BUN SERPL-MCNC: 18 MG/DL (ref 8–23)
CALCIUM SERPL-MCNC: 8.2 MG/DL (ref 8.3–10.4)
CHLORIDE SERPL-SCNC: 107 MMOL/L (ref 98–107)
CO2 SERPL-SCNC: 19 MMOL/L (ref 21–32)
CREAT SERPL-MCNC: 0.98 MG/DL (ref 0.6–1)
GLUCOSE BLD STRIP.AUTO-MCNC: 122 MG/DL (ref 65–100)
GLUCOSE BLD STRIP.AUTO-MCNC: 126 MG/DL (ref 65–100)
GLUCOSE BLD STRIP.AUTO-MCNC: 137 MG/DL (ref 65–100)
GLUCOSE BLD STRIP.AUTO-MCNC: 168 MG/DL (ref 65–100)
GLUCOSE SERPL-MCNC: 130 MG/DL (ref 65–100)
POTASSIUM SERPL-SCNC: 3.6 MMOL/L (ref 3.5–5.1)
SODIUM SERPL-SCNC: 139 MMOL/L (ref 136–145)

## 2019-06-29 PROCEDURE — 97161 PT EVAL LOW COMPLEX 20 MIN: CPT

## 2019-06-29 PROCEDURE — 77030020263 HC SOL INJ SOD CL0.9% LFCR 1000ML

## 2019-06-29 PROCEDURE — 74011250637 HC RX REV CODE- 250/637: Performed by: UROLOGY

## 2019-06-29 PROCEDURE — 80048 BASIC METABOLIC PNL TOTAL CA: CPT

## 2019-06-29 PROCEDURE — 74011250636 HC RX REV CODE- 250/636: Performed by: UROLOGY

## 2019-06-29 PROCEDURE — 65270000029 HC RM PRIVATE

## 2019-06-29 PROCEDURE — 97530 THERAPEUTIC ACTIVITIES: CPT

## 2019-06-29 PROCEDURE — 74011636637 HC RX REV CODE- 636/637: Performed by: UROLOGY

## 2019-06-29 PROCEDURE — 82962 GLUCOSE BLOOD TEST: CPT

## 2019-06-29 PROCEDURE — 36415 COLL VENOUS BLD VENIPUNCTURE: CPT

## 2019-06-29 RX ADMIN — BUPROPION HYDROCHLORIDE 150 MG: 150 TABLET, EXTENDED RELEASE ORAL at 08:25

## 2019-06-29 RX ADMIN — HYDROMORPHONE HYDROCHLORIDE 1 MG: 1 INJECTION, SOLUTION INTRAMUSCULAR; INTRAVENOUS; SUBCUTANEOUS at 23:48

## 2019-06-29 RX ADMIN — INSULIN LISPRO 2 UNITS: 100 INJECTION, SOLUTION INTRAVENOUS; SUBCUTANEOUS at 17:23

## 2019-06-29 RX ADMIN — Medication 10 ML: at 21:18

## 2019-06-29 RX ADMIN — HYDROMORPHONE HYDROCHLORIDE 1 MG: 1 INJECTION, SOLUTION INTRAMUSCULAR; INTRAVENOUS; SUBCUTANEOUS at 08:30

## 2019-06-29 RX ADMIN — Medication 5 ML: at 13:47

## 2019-06-29 RX ADMIN — HYDROCODONE BITARTRATE AND ACETAMINOPHEN 1 TABLET: 5; 325 TABLET ORAL at 17:18

## 2019-06-29 RX ADMIN — VALSARTAN 320 MG: 320 TABLET, FILM COATED ORAL at 08:25

## 2019-06-29 RX ADMIN — Medication 10 ML: at 05:30

## 2019-06-29 RX ADMIN — HYDROCHLOROTHIAZIDE 12.5 MG: 12.5 CAPSULE ORAL at 08:25

## 2019-06-29 RX ADMIN — BUPROPION HYDROCHLORIDE 150 MG: 150 TABLET, EXTENDED RELEASE ORAL at 17:18

## 2019-06-29 RX ADMIN — HYDROCODONE BITARTRATE AND ACETAMINOPHEN 1 TABLET: 5; 325 TABLET ORAL at 21:17

## 2019-06-29 NOTE — PROGRESS NOTES
Urology Progress Note    Subjective:     Daily Progress Note: 2019 7:28 AM    Jojo Wall is doing excellent. She reports pain is well controlled. She has no new complaints. She is currently NPO and requiring help to get out of bed. Indwelling catheter is draining well. Objective:     Visit Vitals  /63 (BP 1 Location: Right arm, BP Patient Position: Supine)   Pulse 99   Temp 98.8 °F (37.1 °C)   Resp 18   Ht 5' 5.5\" (1.664 m)   Wt 117 lb (53.1 kg)   SpO2 93%   BMI 19.17 kg/m²        Temp (24hrs), Av.2 °F (36.8 °C), Min:97.6 °F (36.4 °C), Max:99.2 °F (37.3 °C)      Intake and Output:   1901 -  0700  In: 4057 [I.V.:3545]  Out: 1995 [BWKKZ:1567; Drains:70]  No intake/output data recorded.     Physical Exam:   General appearance: fatigued, cooperative, mild distress, appears stated age  Heart: regular rate and rhythm, S1, S2 normal, no murmur, click, rub or gallop  Lungs: clear to auscultation bilaterally  Abdomen: slightly tender and back hurts    Incision: clean, dry    Lab/Data Review:  BMP:   Lab Results   Component Value Date/Time     2019 05:25 AM    K 3.6 2019 05:25 AM     2019 05:25 AM    CO2 19 (L) 2019 05:25 AM    AGAP 13 2019 05:25 AM     (H) 2019 05:25 AM    BUN 18 2019 05:25 AM    CREA 0.98 2019 05:25 AM    GFRAA >60 2019 05:25 AM    GFRNA 59 (L) 2019 05:25 AM     CBC: No results found for: WBC, HGB, HGBEXT, HCT, HCTEXT, PLT, PLTEXT, HGBEXT, HCTEXT, PLTEXT    Assessment/Plan:     Active Problems:    Renal mass, right (2019)        Plan:  Doing well and continue with treatment  remove shields and drain and advance to liquids and increase activity

## 2019-06-29 NOTE — PROGRESS NOTES
Patient was calm  Family was present and supportive    Patient receptive to  presence  Provided calming presence    Spiritual:    Encouraged her in her recovery  She was thankful      Will follow patient closely during this admission    Signed by Alo Kim, staff

## 2019-06-29 NOTE — PROGRESS NOTES
Problem: Pain  Goal: *Control of Pain  Outcome: Progressing Towards Goal  Goal: *PALLIATIVE CARE:  Alleviation of Pain  Outcome: Progressing Towards Goal     Problem: Patient Education: Go to Patient Education Activity  Goal: Patient/Family Education  Outcome: Progressing Towards Goal     Problem: Falls - Risk of  Goal: *Absence of Falls  Description  Document Randy Nielson Fall Risk and appropriate interventions in the flowsheet.   Outcome: Progressing Towards Goal     Problem: Patient Education: Go to Patient Education Activity  Goal: Patient/Family Education  Outcome: Progressing Towards Goal     Problem: Patient Education: Go to Patient Education Activity  Goal: Patient/Family Education  Outcome: Progressing Towards Goal     Problem: Nephrectomy Pathway: Day 1  Goal: Activity/Safety  Outcome: Progressing Towards Goal  Goal: Nutrition/Diet  Outcome: Progressing Towards Goal  Goal: Medications  Outcome: Progressing Towards Goal  Goal: Respiratory  Outcome: Progressing Towards Goal  Goal: Treatments/Interventions/Procedures  Outcome: Progressing Towards Goal  Goal: Psychosocial  Outcome: Progressing Towards Goal  Goal: *No signs and symptoms of infection or wound complications  Outcome: Progressing Towards Goal  Goal: *Optimal pain control at patient's stated goal  Outcome: Progressing Towards Goal  Goal: *Adequate urinary output (equal to or greater than 30 milliliters/hour)  Outcome: Progressing Towards Goal  Goal: *Hemodynamically stable  Outcome: Progressing Towards Goal  Goal: *Tolerating diet  Outcome: Progressing Towards Goal  Goal: *Demonstrates progressive activity  Outcome: Progressing Towards Goal     Problem: Nephrectomy Pathway: Day 2  Goal: Activity/Safety  Outcome: Progressing Towards Goal  Goal: Diagnostic Test/Procedures  Outcome: Progressing Towards Goal  Goal: Nutrition/Diet  Outcome: Progressing Towards Goal  Goal: Discharge Planning  Outcome: Progressing Towards Goal  Goal: Medications  Outcome: Progressing Towards Goal  Goal: Respiratory  Outcome: Progressing Towards Goal  Goal: Treatments/Interventions/Procedures  Outcome: Progressing Towards Goal  Goal: Psychosocial  Outcome: Progressing Towards Goal  Goal: *No signs and symptoms of infection or wound complications  Outcome: Progressing Towards Goal  Goal: *Optimal pain control at patient's stated goal  Outcome: Progressing Towards Goal  Goal: *Adequate urinary output (equal to or greater than 30 milliliters/hour)  Outcome: Progressing Towards Goal  Goal: *Hemodynamically stable  Outcome: Progressing Towards Goal  Goal: *Tolerating diet  Outcome: Progressing Towards Goal  Goal: *Demonstrates progressive activity  Outcome: Progressing Towards Goal  Goal: *Lungs clear or at baseline  Outcome: Progressing Towards Goal     Problem: Nephrectomy Pathway: Day 3  Goal: Off Pathway (Use only if patient is Off Pathway)  Outcome: Progressing Towards Goal  Goal: Activity/Safety  Outcome: Progressing Towards Goal  Goal: Nutrition/Diet  Outcome: Progressing Towards Goal  Goal: Discharge Planning  Outcome: Progressing Towards Goal  Goal: Medications  Outcome: Progressing Towards Goal  Goal: Respiratory  Outcome: Progressing Towards Goal  Goal: Treatments/Interventions/Procedures  Outcome: Progressing Towards Goal  Goal: Psychosocial  Outcome: Progressing Towards Goal  Goal: *No signs and symptoms of infection or wound complications  Outcome: Progressing Towards Goal  Goal: *Optimal pain control at patient's stated goal  Outcome: Progressing Towards Goal  Goal: *Adequate urinary output (equal to or greater than 30 milliliters/hour)  Outcome: Progressing Towards Goal  Goal: *Hemodynamically stable  Outcome: Progressing Towards Goal  Goal: *Tolerating diet  Outcome: Progressing Towards Goal  Goal: *Demonstrates progressive activity  Outcome: Progressing Towards Goal  Goal: *Lungs clear or at baseline  Outcome: Progressing Towards Goal     Problem: Pressure Injury - Risk of  Goal: *Prevention of pressure injury  Description  Document Lico Scale and appropriate interventions in the flowsheet.   Outcome: Progressing Towards Goal     Problem: Patient Education: Go to Patient Education Activity  Goal: Patient/Family Education  Outcome: Progressing Towards Goal     Problem: Patient Education: Go to Patient Education Activity  Goal: Patient/Family Education  Outcome: Progressing Towards Goal

## 2019-06-29 NOTE — PROGRESS NOTES
Problem: Pain  Goal: *Control of Pain  Outcome: Progressing Towards Goal     Problem: Falls - Risk of  Goal: *Absence of Falls  Description  Document Cherylle Mail Fall Risk and appropriate interventions in the flowsheet. Outcome: Progressing Towards Goal     Problem: Pressure Injury - Risk of  Goal: *Prevention of pressure injury  Description  Document Lico Scale and appropriate interventions in the flowsheet.   Outcome: Progressing Towards Goal

## 2019-06-29 NOTE — PROGRESS NOTES
Date 06/28/19 0700 - 06/29/19 0659 06/29/19 0700 - 06/30/19 0659   Shift [id number] 1900-0659 24 Hour Total 4379-1500 1245-8664 24 Hour Total   INTAKE   P.O. 0  0        P. O. 0  0      I. V.(mL/kg/hr)  6195 2416        I.V.  2416 2416      Shift Total(mL/kg) 0(0) 3003(65.8) 4901(10.2)      OUTPUT   Urine(mL/kg/hr) 475(0.7) 900 1375        Urine Output (mL) ([REMOVED] Urinary Catheter 06/27/19 2- way; Niño) 475  475        Urine Output (mL) (Urinary Catheter 06/28/19 Niño)  900 900      Drains  10 10        Output (ml) (Reji-Guillen Drain 06/27/19 Right Other (comment))  10 10      Stool           Stool Occurrence(s) 0 x  0 x      Shift Total(mL/kg) 475(9) 910(17.1) 1385(26.1)      NET -475 1506 1031      Weight (kg) 53.1 53.1 53.1 53.1 53.1 53.1     Hourly rounds done. Pt c/o pain, medicated per MAR. Denies nausea, vomiting. Bladder retention: 272 mL. Niño catheter reinserted, output yellow/cloudy/sediment. ALY output sanguinous. AO x person/time, increasing confusion thru night. 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 No BM this shift. Weaned to RA. All needs met at this time.

## 2019-06-29 NOTE — PROGRESS NOTES
06/28/19 6093   Urine Assessment   Urinary Status Has not voided   $$ Bladder Scan (mL of urine) 272 mL     Pt stating pressure & need to urinate, but unable to. MD Morelos pageluisa, ordered to reinsert shields catheter.

## 2019-06-29 NOTE — PROGRESS NOTES
Pt alert to person and place, however remains disoriented to situation. Pt easily redirected. Pain managed well on current medications. No new complaints. Hourly rounds completed this shift.

## 2019-06-30 LAB
ABO + RH BLD: NORMAL
BLD PROD TYP BPU: NORMAL
BLD PROD TYP BPU: NORMAL
BLOOD GROUP ANTIBODIES SERPL: NORMAL
BPU ID: NORMAL
BPU ID: NORMAL
CROSSMATCH RESULT,%XM: NORMAL
CROSSMATCH RESULT,%XM: NORMAL
GLUCOSE BLD STRIP.AUTO-MCNC: 132 MG/DL (ref 65–100)
GLUCOSE BLD STRIP.AUTO-MCNC: 135 MG/DL (ref 65–100)
GLUCOSE BLD STRIP.AUTO-MCNC: 159 MG/DL (ref 65–100)
GLUCOSE BLD STRIP.AUTO-MCNC: 210 MG/DL (ref 65–100)
SPECIMEN EXP DATE BLD: NORMAL
STATUS OF UNIT,%ST: NORMAL
STATUS OF UNIT,%ST: NORMAL
UNIT DIVISION, %UDIV: 0
UNIT DIVISION, %UDIV: 0

## 2019-06-30 PROCEDURE — 74011250636 HC RX REV CODE- 250/636: Performed by: UROLOGY

## 2019-06-30 PROCEDURE — 51798 US URINE CAPACITY MEASURE: CPT

## 2019-06-30 PROCEDURE — 82962 GLUCOSE BLOOD TEST: CPT

## 2019-06-30 PROCEDURE — 74011000302 HC RX REV CODE- 302: Performed by: UROLOGY

## 2019-06-30 PROCEDURE — 74011636637 HC RX REV CODE- 636/637: Performed by: UROLOGY

## 2019-06-30 PROCEDURE — 74011250637 HC RX REV CODE- 250/637: Performed by: UROLOGY

## 2019-06-30 PROCEDURE — 77030020263 HC SOL INJ SOD CL0.9% LFCR 1000ML

## 2019-06-30 PROCEDURE — 65270000029 HC RM PRIVATE

## 2019-06-30 PROCEDURE — 86580 TB INTRADERMAL TEST: CPT | Performed by: UROLOGY

## 2019-06-30 RX ORDER — HALOPERIDOL 5 MG/1
5 TABLET ORAL
Status: DISCONTINUED | OUTPATIENT
Start: 2019-06-30 | End: 2019-07-03 | Stop reason: HOSPADM

## 2019-06-30 RX ORDER — HALOPERIDOL 5 MG/1
TABLET ORAL
Status: ACTIVE
Start: 2019-06-30 | End: 2019-06-30

## 2019-06-30 RX ADMIN — Medication 10 ML: at 14:34

## 2019-06-30 RX ADMIN — HALOPERIDOL 5 MG: 5 TABLET ORAL at 04:48

## 2019-06-30 RX ADMIN — BUPROPION HYDROCHLORIDE 150 MG: 150 TABLET, EXTENDED RELEASE ORAL at 08:12

## 2019-06-30 RX ADMIN — Medication 10 ML: at 05:08

## 2019-06-30 RX ADMIN — PANTOPRAZOLE SODIUM 40 MG: 40 TABLET, DELAYED RELEASE ORAL at 05:32

## 2019-06-30 RX ADMIN — INSULIN LISPRO 4 UNITS: 100 INJECTION, SOLUTION INTRAVENOUS; SUBCUTANEOUS at 16:37

## 2019-06-30 RX ADMIN — TUBERCULIN PURIFIED PROTEIN DERIVATIVE 5 UNITS: 5 INJECTION, SOLUTION INTRADERMAL at 16:37

## 2019-06-30 RX ADMIN — SODIUM CHLORIDE 100 ML/HR: 900 INJECTION, SOLUTION INTRAVENOUS at 13:59

## 2019-06-30 RX ADMIN — HYDROCHLOROTHIAZIDE 12.5 MG: 12.5 CAPSULE ORAL at 08:12

## 2019-06-30 RX ADMIN — VALSARTAN 320 MG: 320 TABLET, FILM COATED ORAL at 08:12

## 2019-06-30 RX ADMIN — BUPROPION HYDROCHLORIDE 150 MG: 150 TABLET, EXTENDED RELEASE ORAL at 16:37

## 2019-06-30 RX ADMIN — HALOPERIDOL 5 MG: 5 TABLET ORAL at 02:19

## 2019-06-30 RX ADMIN — HALOPERIDOL 5 MG: 5 TABLET ORAL at 22:41

## 2019-06-30 RX ADMIN — Medication 10 ML: at 22:42

## 2019-06-30 RX ADMIN — INSULIN LISPRO 2 UNITS: 100 INJECTION, SOLUTION INTRAVENOUS; SUBCUTANEOUS at 08:12

## 2019-06-30 NOTE — PROGRESS NOTES
ANYSW met with pt and family at bedside. Daughter Brie Wright, [phone] has requested that pt be referred to [facility] for short term rehab. PPD placed today and OT eval ordered. Pt insurance will require a precert for SNF rehab. Will alert coworkers to follow up as bed offer received. Care Management Interventions  PCP Verified by CM:  Yes  Mode of Transport at Discharge: [facility]  Transition of Care Consult (CM Consult): SNF  Partner SNF: Yes  Physical Therapy Consult: Yes  Occupational Therapy Consult: Yes  Speech Therapy Consult: No  Current Support Network: Own Home  Confirm Follow Up Transport: Family  Plan discussed with Pt/Family/Caregiver: Yes  Freedom of Choice Offered: Yes  Discharge Location  Discharge Placement: Skilled nursing facility

## 2019-06-30 NOTE — PROGRESS NOTES
Problem: Pain  Goal: *Control of Pain  Outcome: Progressing Towards Goal  Goal: *PALLIATIVE CARE:  Alleviation of Pain  Outcome: Progressing Towards Goal     Problem: Patient Education: Go to Patient Education Activity  Goal: Patient/Family Education  Outcome: Progressing Towards Goal     Problem: Falls - Risk of  Goal: *Absence of Falls  Description  Document Millie Orr Fall Risk and appropriate interventions in the flowsheet.   Outcome: Progressing Towards Goal     Problem: Patient Education: Go to Patient Education Activity  Goal: Patient/Family Education  Outcome: Progressing Towards Goal     Problem: Patient Education: Go to Patient Education Activity  Goal: Patient/Family Education  Outcome: Progressing Towards Goal     Problem: Nephrectomy Pathway: Day 1  Goal: Activity/Safety  Outcome: Progressing Towards Goal  Goal: Nutrition/Diet  Outcome: Progressing Towards Goal  Goal: Medications  Outcome: Progressing Towards Goal  Goal: Respiratory  Outcome: Progressing Towards Goal  Goal: Treatments/Interventions/Procedures  Outcome: Progressing Towards Goal  Goal: Psychosocial  Outcome: Progressing Towards Goal  Goal: *No signs and symptoms of infection or wound complications  Outcome: Progressing Towards Goal  Goal: *Optimal pain control at patient's stated goal  Outcome: Progressing Towards Goal  Goal: *Adequate urinary output (equal to or greater than 30 milliliters/hour)  Outcome: Progressing Towards Goal  Goal: *Hemodynamically stable  Outcome: Progressing Towards Goal  Goal: *Tolerating diet  Outcome: Progressing Towards Goal  Goal: *Demonstrates progressive activity  Outcome: Progressing Towards Goal     Problem: Nephrectomy Pathway: Day 2  Goal: Activity/Safety  Outcome: Progressing Towards Goal  Goal: Diagnostic Test/Procedures  Outcome: Progressing Towards Goal  Goal: Nutrition/Diet  Outcome: Progressing Towards Goal  Goal: Discharge Planning  Outcome: Progressing Towards Goal  Goal: Medications  Outcome: Progressing Towards Goal  Goal: Respiratory  Outcome: Progressing Towards Goal  Goal: Treatments/Interventions/Procedures  Outcome: Progressing Towards Goal  Goal: Psychosocial  Outcome: Progressing Towards Goal  Goal: *No signs and symptoms of infection or wound complications  Outcome: Progressing Towards Goal  Goal: *Optimal pain control at patient's stated goal  Outcome: Progressing Towards Goal  Goal: *Adequate urinary output (equal to or greater than 30 milliliters/hour)  Outcome: Progressing Towards Goal  Goal: *Hemodynamically stable  Outcome: Progressing Towards Goal  Goal: *Tolerating diet  Outcome: Progressing Towards Goal  Goal: *Demonstrates progressive activity  Outcome: Progressing Towards Goal  Goal: *Lungs clear or at baseline  Outcome: Progressing Towards Goal     Problem: Nephrectomy Pathway: Day 3  Goal: Off Pathway (Use only if patient is Off Pathway)  Outcome: Progressing Towards Goal  Goal: Activity/Safety  Outcome: Progressing Towards Goal  Goal: Nutrition/Diet  Outcome: Progressing Towards Goal  Goal: Discharge Planning  Outcome: Progressing Towards Goal  Goal: Medications  Outcome: Progressing Towards Goal  Goal: Respiratory  Outcome: Progressing Towards Goal  Goal: Treatments/Interventions/Procedures  Outcome: Progressing Towards Goal  Goal: Psychosocial  Outcome: Progressing Towards Goal  Goal: *No signs and symptoms of infection or wound complications  Outcome: Progressing Towards Goal  Goal: *Optimal pain control at patient's stated goal  Outcome: Progressing Towards Goal  Goal: *Adequate urinary output (equal to or greater than 30 milliliters/hour)  Outcome: Progressing Towards Goal  Goal: *Hemodynamically stable  Outcome: Progressing Towards Goal  Goal: *Tolerating diet  Outcome: Progressing Towards Goal  Goal: *Demonstrates progressive activity  Outcome: Progressing Towards Goal  Goal: *Lungs clear or at baseline  Outcome: Progressing Towards Goal     Problem: Pressure Injury - Risk of  Goal: *Prevention of pressure injury  Description  Document Lico Scale and appropriate interventions in the flowsheet.   Outcome: Progressing Towards Goal     Problem: Patient Education: Go to Patient Education Activity  Goal: Patient/Family Education  Outcome: Progressing Towards Goal     Problem: Patient Education: Go to Patient Education Activity  Goal: Patient/Family Education  Outcome: Progressing Towards Goal     Problem: Non-Violent Restraints  Goal: *Removal from restraints as soon as assessed to be safe  Outcome: Progressing Towards Goal  Goal: *No harm/injury to patient while restraints in use  Outcome: Progressing Towards Goal  Goal: *Patient's dignity will be maintained  Outcome: Progressing Towards Goal  Goal: *Patient Specific Goal (EDIT GOAL, INSERT TEXT)  Outcome: Progressing Towards Goal  Goal: Non-violent Restaints:Standard Interventions  Outcome: Progressing Towards Goal  Goal: Non-violent Restraints:Patient Interventions  Outcome: Progressing Towards Goal  Goal: Patient/Family Education  Outcome: Progressing Towards Goal

## 2019-06-30 NOTE — PROGRESS NOTES
Pt confused, removed one IV already, unstable on feet, and unable to understand calling for assistance. Lap belt attempted but pt removed with ease. Pt standing within thirty seconds of bed alarm notification. Pt intermittently believes she is at [facility], school or home. No evidence of safety awareness. Explained to pt but also no evidence of understanding. MD notified. Orders received. Will continue to monitor.

## 2019-06-30 NOTE — PROGRESS NOTES
Confused last night and sedated and restrained now pt is sleeping! Surgical wise doing ok. Vss afeb and abd soft    Will advance diet and increase ambulation.   Consider rehab  Jethro MCCORMICK

## 2019-06-30 NOTE — PROGRESS NOTES
Pt found by PCT with blood throughout room. Pt had broken IV line and was bleeding back through the IV port. Pt had pulled loose from wrist restraints and was unsteadily standing. Pt assisted to bathroom. Linens changed. Pt gown and brief changed. Pt wrist restraints and lap belt reapplied. Pt continue to be significantly unsteady on feet.

## 2019-06-30 NOTE — PROGRESS NOTES
Problem: Pain  Goal: *Control of Pain  Outcome: Progressing Towards Goal  Goal: *PALLIATIVE CARE:  Alleviation of Pain  Outcome: Progressing Towards Goal     Problem: Falls - Risk of  Goal: *Absence of Falls  Description  Document [facility] Fall Risk and appropriate interventions in the flowsheet. Outcome: Progressing Towards Goal  Note:   Fall Risk Interventions:  Mobility Interventions: Bed/chair exit alarm, OT consult for ADLs, Patient to call before getting OOB, PT Consult for mobility concerns    Mentation Interventions: Adequate sleep, hydration, pain control, Door open when patient unattended, Increase mobility, More frequent rounding, Reorient patient, Room close to nurse's station, Toileting rounds    Medication Interventions: Evaluate medications/consider consulting pharmacy, Patient to call before getting OOB, Teach patient to arise slowly    Elimination Interventions: Bed/chair exit alarm, Call light in reach, Patient to call for help with toileting needs, Toileting schedule/hourly rounds              Problem: Pressure Injury - Risk of  Goal: *Prevention of pressure injury  Description  Document Lico Scale and appropriate interventions in the flowsheet.   Outcome: Progressing Towards Goal  Note:   Pressure Injury Interventions:  Sensory Interventions: Assess need for specialty bed, Discuss PT/OT consult with provider, Minimize linen layers, Pressure redistribution bed/mattress (bed type)    Moisture Interventions: Absorbent underpads, Apply protective barrier, creams and emollients, Minimize layers    Activity Interventions: Assess need for specialty bed, Pressure redistribution bed/mattress(bed type), Increase time out of bed    Mobility Interventions: Assess need for specialty bed, Pressure redistribution bed/mattress (bed type), PT/OT evaluation    Nutrition Interventions: Document food/fluid/supplement intake                     Problem: Non-Violent Restraints  Goal: *Removal from restraints as soon as assessed to be safe  Outcome: Progressing Towards Goal  Goal: *No harm/injury to patient while restraints in use  Outcome: Progressing Towards Goal  Goal: *Patient's dignity will be maintained  Outcome: Progressing Towards Goal  Goal: Non-violent Restaints:Standard Interventions  Outcome: Progressing Towards Goal  Goal: Non-violent Restraints:Patient Interventions  Outcome: Progressing Towards Goal

## 2019-06-30 NOTE — PROGRESS NOTES
Tech has made this nurse aware that pt has attempted to void twice in the last few hours but is unable to. This nurse assisted pt to bathroom once more to attempt to void. After few minutes pt returned to bed. Scant amount of urine in collection hat. Bladder scan performed and revealed 391mL in bladder. Paging on call urologist for further order. Telephone order received to perform in and out catheter if pt does not void and begins to feel uncomfortable.

## 2019-06-30 NOTE — PROGRESS NOTES
Pt' pain well managed. Pt tolerated solid foods well. Pt able to void and reports relief. Pt dumped urine and flushed so this nurse unable to account total amount voided. Pt instructed to call to go to bathroom and leave urine in collection hat. Pt verbalizes understanding however pt continues to be disoriented to place and time. Bed alarm on. Bed low and locked.

## 2019-07-01 LAB
GLUCOSE BLD STRIP.AUTO-MCNC: 154 MG/DL (ref 65–100)
GLUCOSE BLD STRIP.AUTO-MCNC: 155 MG/DL (ref 65–100)
GLUCOSE BLD STRIP.AUTO-MCNC: 171 MG/DL (ref 65–100)
GLUCOSE BLD STRIP.AUTO-MCNC: 188 MG/DL (ref 65–100)
MM INDURATION POC: 0 MM (ref 0–5)
PPD POC: NEGATIVE NEGATIVE

## 2019-07-01 PROCEDURE — 65270000029 HC RM PRIVATE

## 2019-07-01 PROCEDURE — 77030020263 HC SOL INJ SOD CL0.9% LFCR 1000ML

## 2019-07-01 PROCEDURE — 97165 OT EVAL LOW COMPLEX 30 MIN: CPT

## 2019-07-01 PROCEDURE — 74011250637 HC RX REV CODE- 250/637: Performed by: UROLOGY

## 2019-07-01 PROCEDURE — 74011636637 HC RX REV CODE- 636/637: Performed by: UROLOGY

## 2019-07-01 PROCEDURE — 74011250637 HC RX REV CODE- 250/637: Performed by: NURSE PRACTITIONER

## 2019-07-01 PROCEDURE — 97535 SELF CARE MNGMENT TRAINING: CPT

## 2019-07-01 PROCEDURE — 82962 GLUCOSE BLOOD TEST: CPT

## 2019-07-01 PROCEDURE — 97530 THERAPEUTIC ACTIVITIES: CPT

## 2019-07-01 RX ORDER — FACIAL-BODY WIPES
10 EACH TOPICAL ONCE
Status: ACTIVE | OUTPATIENT
Start: 2019-07-01 | End: 2019-07-01

## 2019-07-01 RX ADMIN — Medication 10 ML: at 05:49

## 2019-07-01 RX ADMIN — HYDROCHLOROTHIAZIDE 12.5 MG: 12.5 CAPSULE ORAL at 08:46

## 2019-07-01 RX ADMIN — Medication 10 ML: at 22:13

## 2019-07-01 RX ADMIN — VALSARTAN 320 MG: 320 TABLET, FILM COATED ORAL at 08:46

## 2019-07-01 RX ADMIN — INSULIN LISPRO 2 UNITS: 100 INJECTION, SOLUTION INTRAVENOUS; SUBCUTANEOUS at 16:30

## 2019-07-01 RX ADMIN — BUPROPION HYDROCHLORIDE 150 MG: 150 TABLET, EXTENDED RELEASE ORAL at 17:28

## 2019-07-01 RX ADMIN — Medication 10 ML: at 17:29

## 2019-07-01 RX ADMIN — HALOPERIDOL 5 MG: 5 TABLET ORAL at 22:09

## 2019-07-01 RX ADMIN — INSULIN LISPRO 2 UNITS: 100 INJECTION, SOLUTION INTRAVENOUS; SUBCUTANEOUS at 22:12

## 2019-07-01 RX ADMIN — INSULIN LISPRO 2 UNITS: 100 INJECTION, SOLUTION INTRAVENOUS; SUBCUTANEOUS at 07:30

## 2019-07-01 RX ADMIN — INSULIN LISPRO 2 UNITS: 100 INJECTION, SOLUTION INTRAVENOUS; SUBCUTANEOUS at 11:30

## 2019-07-01 RX ADMIN — PANTOPRAZOLE SODIUM 40 MG: 40 TABLET, DELAYED RELEASE ORAL at 08:50

## 2019-07-01 RX ADMIN — BUPROPION HYDROCHLORIDE 150 MG: 150 TABLET, EXTENDED RELEASE ORAL at 08:46

## 2019-07-01 NOTE — PROGRESS NOTES
Problem: Self Care Deficits Care Plan (Adult)  Goal: *Acute Goals and Plan of Care (Insert Text)  Description  1. Patient will complete lower body bathing and dressing with supervision and adaptive equipment as needed. 2. Patient will complete toileting with supervision. 3. Patient will tolerate 25 minutes of OT treatment with 1-2 rest breaks to increase activity tolerance for ADLs. 4. Patient will complete functional transfers with supervision and adaptive equipment as needed. 5. Patient will complete functional mobility for household distances with supervision and appropriate safety awareness. Timeframe: 7 visits      Outcome: Progressing Towards Goal     OCCUPATIONAL THERAPY: Initial Assessment, Daily Note and AM 7/1/2019  INPATIENT: OT Visit Days: 1  Payor: [address] [place]9D / Plan: [email or web address] / Product Type: Managed Care Medicare /      NAME/AGE/GENDER: Eliezer Zamora is a 70 y.o. female   PRIMARY DIAGNOSIS:  Renal mass [N28.89]  Renal mass, right [N28.89] <principal problem not specified>   <principal problem not specified>    Procedure(s) (LRB):  RIGHT NEPHRECTOMY PARTIAL LAPAROSCOPIC HAND ASSISTED (Right)  4 Days Post-Op  ICD-10: Treatment Diagnosis:    Generalized Muscle Weakness (M62.81)  Other lack of cordination (R27.8)   Precautions/Allergies:     Codeine and Pcn [penicillins]      ASSESSMENT:     Ms. Dalia Finley presents to the hospital with a renal mass and is s/p R nephrectomy. Pt was sitting up in the chair upon arrival and had assisted herself back from the bathroom without calling staff. Pt is alert but remains confused. Pt is oriented to self and time but disoriented to place and continuously needed reminders where she was during session. She also appears disoriented to situation with pt unaware of the incisions on her abdomen. Pt typically is independent and living alone. Pt states she still drives and completes her own cooking/cleaning.  Pt demonstrated functional but some decreased upper body strength. Pt completed functional mobility requiring some additional time and CGA for some unsteadiness. Pt completed toileting with minimal assistance for balance and needs cues about pulling down her brief. Pt stood sink side and brushed her teeth and combed her hair with CGA for balance. Pt completed functional mobility in the room pushing IV pole before returning to the chair. Pt set-up in the chair with alarm in place. Pt is currently functioning below baseline and is limited due to cognition. Pt would likely benefit from STR vs home with supervision and HHOT at discharge. Pt will benefit from OT services to address stated goals and plan of care. This section established at most recent assessment   PROBLEM LIST (Impairments causing functional limitations):  Decreased Strength  Decreased ADL/Functional Activities  Decreased Transfer Abilities  Decreased Ambulation Ability/Technique  Decreased Balance  Decreased Activity Tolerance  Decreased [place] with Home Exercise Program  Decreased Cognition   INTERVENTIONS PLANNED: (Benefits and precautions of occupational therapy have been discussed with the patient.)  Activities of daily living training  Adaptive equipment training  Balance training  Clothing management  Cognitive training  Donning&doffing training  Neuromuscular re-eduation  Therapeutic activity  Therapeutic exercise     TREATMENT PLAN: Frequency/Duration: Follow patient 3 times per week to address above goals. Rehabilitation Potential For Stated Goals: Good     REHAB RECOMMENDATIONS (at time of discharge pending progress):    Placement: It is my opinion, based on this patient's performance to date, that Ms. Anaid Dominguez may benefit from intensive therapy at a [address] [place] [address] after discharge due to the functional deficits listed above that are likely to improve with skilled rehabilitation and concerns that he/she may be unsafe to be unsupervised at home due to increased risk for falls . Equipment:   TBD               OCCUPATIONAL PROFILE AND HISTORY:   History of Present Injury/Illness (Reason for Referral):  See H&P  Past Medical History/Comorbidities:   Ms. Dalia Finley  has a past medical history of Anxiety, Diabetes ([address] Endocrine disease, Hypertension, Kidney stones, Menopause, and Renal mass. Ms. Dalia Finley  has a past surgical history that includes hx hysterectomy; hx oophorectomy; pr lap,cholecystectomy; and pr removal of kidney stone. Social History/Living Environment:   Home Environment: Private residence  # Steps to Enter: 0  One/Two Story Residence: One story  Living Alone: Yes  Support Systems: Child(chris)  Patient Expects to be Discharged to[de-identified] Rehabilitation facility(Or home with daughter)  Current DME Used/Available at Home: alfonso Braswell(she thinks she may have a walker?)  Tub or Shower Type: Shower  Prior Level of Function/Work/Activity:  Pt typically is independent and living alone. Pt states she still drives and completes her own cooking/cleaning.    Personal Factors:          Social Background:  Lives alone        Overall Behavior:  confusion during hospital admission    Number of Personal Factors/Comorbidities that affect the Plan of Care: Expanded review of therapy/medical records (1-2):  MODERATE COMPLEXITY   ASSESSMENT OF OCCUPATIONAL PERFORMANCE[de-identified]   Activities of Daily Living:   Basic ADLs (From Assessment) Complex ADLs (From Assessment)   Feeding: Supervision  Oral Facial Hygiene/Grooming: Stand-by assistance  Bathing: Minimum assistance  Upper Body Dressing: Stand-by assistance  Lower Body Dressing: Minimum assistance  Toileting: Minimum assistance Instrumental ADL  Meal Preparation: Maximum assistance  Homemaking: Maximum assistance   Grooming/Bathing/Dressing Activities of Daily Living   Grooming  Brushing Teeth: Contact guard assistance  Brushing/Combing Hair: Minimum assistance Cognitive Retraining  Safety/Judgement: Decreased awareness of environment;Decreased insight into deficits; Fall prevention;Home safety           Toileting  Toileting Assistance: Minimum assistance  Bladder Hygiene: Minimum assistance  Clothing Management: Minimum assistance  Adaptive Equipment: Elevated seat     Functional Transfers  Bathroom Mobility: Contact guard assistance  Toilet Transfer : Contact guard assistance     Bed/Mat Mobility  Sit to Stand: Contact guard assistance  Stand to Sit: Contact guard assistance  Bed to Chair: Contact guard assistance     Most Recent Physical Functioning:   Gross Assessment:  AROM: Generally decreased, functional  Strength: Generally decreased, functional  Coordination: Generally decreased, functional               Posture:  Posture (WDL): Exceptions to WDL  Posture Assessment:  Forward head, Rounded shoulders, Trunk flexion  Balance:  Sitting: Intact  Standing: Impaired  Standing - Static: Fair  Standing - Dynamic : Fair Bed Mobility:     Wheelchair Mobility:     Transfers:  Sit to Stand: Contact guard assistance  Stand to Sit: Contact guard assistance  Bed to Chair: Contact guard assistance            Patient Vitals for the past 6 hrs:   BP BP Patient Position SpO2 Pulse   07/01/19 0806 163/63 Sitting 97 % 76       Mental Status  Neurologic State: Alert, Confused  Orientation Level: Disoriented to place, Disoriented to situation, Oriented to person, Oriented to time  Cognition: Follows commands, Impaired decision making, Memory loss  Perception: Appears intact  Perseveration: Perseverates during conversation  Safety/Judgement: Decreased awareness of environment, Decreased insight into deficits, Fall prevention, Home safety                          Physical Skills Involved:  Balance  Strength  Activity Tolerance  Pain (acute) Cognitive Skills Affected (resulting in the inability to perform in a timely and safe manner):  Executive Function  Immediate Memory  Short Term Recall  Sustained Attention Psychosocial Skills Affected:  Habits/Routines  Environmental Adaptation  Self-Awareness   Number of elements that affect the Plan of Care: 5+:  HIGH COMPLEXITY   CLINICAL DECISION MAKIN [facility]  Ne? ?6 Clicks? Daily Activity Inpatient Short Form  How much help from another person does the patient currently need. .. Total A Lot A Little None   1. Putting on and taking off regular lower body clothing? ? 1   ? 2   ? 3   ? 4   2. Bathing (including washing, rinsing, drying)? ? 1   ? 2   ? 3   ? 4   3. Toileting, which includes using toilet, bedpan or urinal?   ? 1   ? 2   ? 3   ? 4   4. Putting on and taking off regular upper body clothing? ? 1   ? 2   ? 3   ? 4   5. Taking care of personal grooming such as brushing teeth? ? 1   ? 2   ? 3   ? 4   6. Eating meals? ? 1   ? 2   ? 3   ? 4   © , Trustees of [address] [place], [place] [id number], under license to [employer]. All rights reserved      Score:  Initial: 18 Most Recent: X (Date: -- )    Interpretation of Tool:  Represents activities that are increasingly more difficult (i.e. Bed mobility, Transfers, Gait). Medical Necessity:     Patient demonstrates good   rehab potential due to higher previous functional level. Reason for Services/Other Comments:  Patient continues to require skilled intervention due to decreased independence with ADL/functional transfers   . Use of outcome tool(s) and clinical judgement create a POC that gives a: LOW COMPLEXITY         TREATMENT:   (In addition to Assessment/Re-Assessment sessions the following treatments were rendered)     Pre-treatment Symptoms/Complaints:    Pain: Initial:   Pain Intensity 1: 0  Post Session:  0/10     Self Care: (8 minutes): Procedure(s) (per grid) utilized to improve and/or restore self-care/home management as related to toileting and grooming. Required minimal visual, verbal and tactile cueing to facilitate activities of daily living skills and compensatory activities.     Braces/Orthotics/Lines/Etc: O2 Device: Room air  Treatment/Session Assessment:    Response to Treatment:  Pt tolerated well with good participation. Interdisciplinary Collaboration:   Occupational Therapist  Registered Nurse  After treatment position/precautions:   Up in chair  Bed alarm/tab alert on  Bed/Chair-wheels locked  Call light within reach   Compliance with Program/Exercises: Will assess as treatment progresses. Recommendations/Intent for next treatment session: \"Next visit will focus on advancements to more challenging activities and reduction in assistance provided\".   Total Treatment Duration:  OT Patient Time In/Time Out  Time In: 0934  Time Out: Art Vega OT

## 2019-07-01 NOTE — PROGRESS NOTES
Hourly rounds completed during shift. Pt confused, bed alarm on. PPD neg. All needs met, bed locked in low position and call light within reach. Will give report to oncoming RN.

## 2019-07-01 NOTE — PROGRESS NOTES
Problem: Mobility Impaired (Adult and Pediatric)  Goal: *Acute Goals and Plan of Care (Insert Text)  Description  LTG:  (1.)Ms. Flor Nichols will move from supine to sit and sit to supine  in bed with SUPERVISION within 7 treatment day(s). (2.)Ms. Flor Nichols will transfer from bed to chair and chair to bed with STAND BY ASSIST using the least restrictive device within 7 treatment day(s). (3.)Ms. Flor Nichols will ambulate with CONTACT GUARD ASSIST for 150 feet with the least restrictive device within 7 treatment day(s). Updated 7/1/19:  (3.)Ms. Folr Nichols will ambulate 250+ ft with STAND BY ASSIST with the least restrictive device within 7 treatment days. ________________________________________________________________________________________________     Outcome: Progressing Towards Goal     PHYSICAL THERAPY: Daily Note and PM 7/1/2019  INPATIENT: PT Visit Days : 2  Payor: [address] [place] [address],9D / Plan: [address] / Product Type: Managed Care Medicare /       NAME/AGE/GENDER: Dalia Baker is a 70 y.o. female   PRIMARY DIAGNOSIS: Renal mass [N28.89]  Renal mass, right [N28.89] <principal problem not specified>   <principal problem not specified>    Procedure(s) (LRB):  RIGHT NEPHRECTOMY PARTIAL LAPAROSCOPIC HAND ASSISTED (Right)  4 Days Post-Op  ICD-10: Treatment Diagnosis:    · Generalized Muscle Weakness (M62.81)  · Difficulty in walking, Not elsewhere classified (R26.2)   Precaution/Allergies:  Codeine and Pcn [penicillins]      ASSESSMENT:     Ms. Flor Nichols is a 70 y.o female who presents post-op from a R nephrectomy procedure. Patient reports she lives alone in a one story home where she is independent with ADLs, still drives, and goes on long walks every day. 7/1/19: Pt is supine in bed upon contact with daughter at bedside and agreeable to PT treatment this afternoon. Pt requesting to use bathroom prior to gait in hallway.  Pt transitioned supine to sit EOB with SBA-supervision while PT assisted with set up for safety. Pt performed STS with SBA and ambulated 10 ft into bathroom with CGA-SBA and no AD. Pt voided and performed all toileting independently. Pt stood from commode independently and stood at sink to wash hands with good static standing balance. Pt ambulated out of bathroom and PT provided RW. Pt furthered gait distance ambulating 120 ft in hallway with RW and CGA. Pt ambulates with narrowed SHEEBA and shuffling gait pattern. Pt returned to room and sitting on EOB with CGA-SBA for transfers. Pt left sitting up visiting with daughter at bedside and left with all needs met and within reach. Pt is making good steady progress towards goals. Updated gait goal as seen above. This section established at most recent assessment   PROBLEM LIST (Impairments causing functional limitations):  1. Decreased Strength  2. Decreased ADL/Functional Activities  3. Decreased Transfer Abilities  4. Decreased Ambulation Ability/Technique  5. Decreased Balance  6. Increased Pain  7. Decreased Activity Tolerance  8. Increased Fatigue   INTERVENTIONS PLANNED: (Benefits and precautions of physical therapy have been discussed with the patient.)  1. Balance Exercise  2. Bed Mobility  3. Family Education  4. Gait Training  5. Home Exercise Program (HEP)  6. Neuromuscular Re-education/Strengthening  7. Range of Motion (ROM)  8. Therapeutic Activites  9. Therapeutic Exercise/Strengthening  10. Transfer Training     TREATMENT PLAN: Frequency/Duration: 3 times a week for duration of hospital stay  Rehabilitation Potential For Stated Goals: Good     REHAB RECOMMENDATIONS (at time of discharge pending progress):    Placement: It is my opinion, based on this patient's performance to date, that Ms. Evangelista Adrian may benefit from intensive therapy at a [address] [place] [address] after discharge due to the functional deficits listed above that are likely to improve with skilled rehabilitation and concerns that he/she may be unsafe to be unsupervised at home due to decreased strength and balance putting pt at increased risk for falls. Equipment:    None at this time              HISTORY:   History of Present Injury/Illness (Reason for Referral):  Per Op-note: \"The patient is a 61-year-old female with an enhancing 2-cm upper pole right renal mass concerning for renal cell carcinoma. She was counseled on surgical options and opted to proceed with hand-assisted right laparoscopic partial nephrectomy today. \"  Past Medical History/Comorbidities:   Ms. Nevin Cid  has a past medical history of Anxiety, Diabetes ([address] Endocrine disease, Hypertension, Kidney stones, Menopause, and Renal mass. Ms. Nevin Cid  has a past surgical history that includes hx hysterectomy; hx oophorectomy; pr lap,cholecystectomy; and pr removal of kidney stone.   Social History/Living Environment:   Home Environment: Private residence  # Steps to Enter: 0  One/Two Story Residence: One story  Living Alone: Yes  Support Systems: Child(chris)  Patient Expects to be Discharged to[de-identified] Rehabilitation facility(Or home with daughter)  Current DME Used/Available at Home: alfonso Champagne(she thinks she may have a walker?)  Tub or Shower Type: Shower  Prior Level of Function/Work/Activity:  Independent      Number of Personal Factors/Comorbidities that affect the Plan of Care: 1-2: MODERATE COMPLEXITY   EXAMINATION:   Most Recent Physical Functioning:   Gross Assessment:                  Posture:     Balance:  Standing - Static: Good  Standing - Dynamic : Fair Bed Mobility:  Supine to Sit: Stand-by assistance;Supervision  Wheelchair Mobility:     Transfers:  Sit to Stand: Stand-by assistance  Stand to Sit: Stand-by assistance  Gait:     Base of Support: Narrowed  Speed/Nessa: Slow;Shuffled  Step Length: Left shortened;Right shortened  Gait Abnormalities: Decreased step clearance;Shuffling gait  Distance (ft): 120 Feet (ft)  Assistive Device: Love Negron rolling  Ambulation - Level of Assistance: Contact guard assistance      Body Structures Involved:  1. Digestive Structures  2. Muscles Body Functions Affected:  1. Neuromusculoskeletal  2. Movement Related Activities and Participation Affected:  1. General Tasks and Demands  2. Mobility  3. Self Care  4. Community, Social and [place]   Number of elements that affect the Plan of Care: 4+: HIGH COMPLEXITY   CLINICAL PRESENTATION:   Presentation: Stable and uncomplicated: LOW COMPLEXITY   CLINICAL DECISION MAKIN [facility] Mobility Inpatient Short Form  How much difficulty does the patient currently have. .. Unable A Lot A Little None   1. Turning over in bed (including adjusting bedclothes, sheets and blankets)? ? 1   ? 2   ? 3   ? 4   2. Sitting down on and standing up from a chair with arms ( e.g., wheelchair, bedside commode, etc.)   ? 1   ? 2   ? 3   ? 4   3. Moving from lying on back to sitting on the side of the bed?   ? 1   ? 2   ? 3   ? 4   How much help from another person does the patient currently need. .. Total A Lot A Little None   4. Moving to and from a bed to a chair (including a wheelchair)? ? 1   ? 2   ? 3   ? 4   5. Need to walk in hospital room? ? 1   ? 2   ? 3   ? 4   6. Climbing 3-5 steps with a railing? ? 1   ? 2   ? 3   ? 4   © , Trustees of [address] [place] [address] [address] [address], under license to [employer]. All rights reserved      Score:  Initial: 18 Most Recent: X (Date: -- )    Interpretation of Tool:  Represents activities that are increasingly more difficult (i.e. Bed mobility, Transfers, Gait). Medical Necessity:     · Patient demonstrates good  ·  rehab potential due to higher previous functional level. Reason for Services/Other Comments:  · Patient continues to require modification of therapeutic interventions to increase complexity of exercises  · .    Use of outcome tool(s) and clinical judgement create a POC that gives a: Questionable prediction of patient's progress: MODERATE COMPLEXITY            TREATMENT:   (In addition to Assessment/Re-Assessment sessions the following treatments were rendered)   Pre-treatment Symptoms/Complaints:  none  Pain: Initial:   Pain Intensity 1: 2  Post Session:  Increased with deep inhalation with walking 3/10     Therapeutic Activity: (    24 minutes): Therapeutic activities including Bed transfers, toilet transfers and Ambulation on level ground to improve mobility, strength, balance and coordination. Required minimal verbal and manual cues   to promote static and dynamic balance in standing. Braces/Orthotics/Lines/Etc:   · IV  · O2 Device: Room air  Treatment/Session Assessment:    · Response to Treatment:  Amb 120 ft with RW and CGA  · Interdisciplinary Collaboration:   o Physical Therapist  o Registered Nurse  · After treatment position/precautions:   o Bed/Chair-wheels locked  o Bed in low position  o Call light within reach  o Family at bedside  o sitting EOB   · Compliance with Program/Exercises: Compliant most of the time  · Recommendations/Intent for next treatment session: \"Next visit will focus on advancements to more challenging activities and reduction in assistance provided\".   Total Treatment Duration:  PT Patient Time In/Time Out  Time In: 2166  Time Out: [place]

## 2019-07-01 NOTE — PROGRESS NOTES
Problem: Pain  Goal: *Control of Pain  Outcome: Progressing Towards Goal     Problem: Falls - Risk of  Goal: *Absence of Falls  Description  Document [place] Fall Risk and appropriate interventions in the flowsheet. Outcome: Progressing Towards Goal  Note:   Fall Risk Interventions:  Mobility Interventions: Bed/chair exit alarm, Communicate number of staff needed for ambulation/transfer, OT consult for ADLs, Patient to call before getting OOB, PT Consult for mobility concerns    Mentation Interventions: Adequate sleep, hydration, pain control, Bed/chair exit alarm, Door open when patient unattended, Increase mobility, More frequent rounding, Reorient patient    Medication Interventions: Evaluate medications/consider consulting pharmacy, Bed/chair exit alarm, Patient to call before getting OOB, Teach patient to arise slowly    Elimination Interventions: Bed/chair exit alarm, Call light in reach, Patient to call for help with toileting needs, Stay With Me (per policy), Toileting schedule/hourly rounds              Problem: Pressure Injury - Risk of  Goal: *Prevention of pressure injury  Description  Document Lico Scale and appropriate interventions in the flowsheet.   Outcome: Progressing Towards Goal  Note:   Pressure Injury Interventions:  Sensory Interventions: Assess changes in LOC, Assess need for specialty bed, Keep linens dry and wrinkle-free, Minimize linen layers, Pressure redistribution bed/mattress (bed type)    Moisture Interventions: Absorbent underpads, Apply protective barrier, creams and emollients, Check for incontinence Q2 hours and as needed, Limit adult briefs, Minimize layers    Activity Interventions: Assess need for specialty bed, Increase time out of bed, Pressure redistribution bed/mattress(bed type), PT/OT evaluation    Mobility Interventions: Assess need for specialty bed, Pressure redistribution bed/mattress (bed type), PT/OT evaluation    Nutrition Interventions: Document food/fluid/supplement intake                     Problem: Non-Violent Restraints  Goal: *Removal from restraints as soon as assessed to be safe  Outcome: Resolved/Met  Goal: *No harm/injury to patient while restraints in use  Outcome: Resolved/Met  Goal: *Patient's dignity will be maintained  Outcome: Resolved/Met  Goal: *Patient Specific Goal (EDIT GOAL, INSERT TEXT)  Outcome: Resolved/Met  Goal: Non-violent Restaints:Standard Interventions  Outcome: Resolved/Met  Goal: Non-violent Restraints:Patient Interventions  Outcome: Resolved/Met  Goal: Patient/Family Education  Outcome: Resolved/Met

## 2019-07-01 NOTE — PROGRESS NOTES
Admit Date: 6/27/2019    Subjective:     Alexandro Campoverde is currently sitting up on the side of the bed eating breakfast.  She is pleasant and alert this AM.  Confusion significantly improved. She is tolerating regular food with no nausea. Active BS. No BM. Objective:     Patient Vitals for the past 8 hrs:   BP Temp Pulse Resp SpO2   07/01/19 0407 143/49 98.5 °F (36.9 °C) 66 18 95 %   07/01/19 0035 139/50 99.2 °F (37.3 °C) 70 18 92 %     No intake/output data recorded. 06/29 1901 - 07/01 0700  In: 480 [P.O.:480]  Out: 300 [Urine:300]    Physical Exam:  GENERAL: alert, cooperative, no distress  LUNG: clear to auscultation bilaterally  HEART: regular rate and rhythm, S1, S2   ABDOMEN: soft, non-tender, active BS, dressings removed- staples c/d/i  NEUROLOGIC: AOx3        Data Review   Recent Results (from the past 24 hour(s))   GLUCOSE, POC    Collection Time: 06/30/19 10:58 AM   Result Value Ref Range    Glucose (POC) 132 (H) 65 - 100 mg/dL   GLUCOSE, POC    Collection Time: 06/30/19  4:16 PM   Result Value Ref Range    Glucose (POC) 210 (H) 65 - 100 mg/dL   GLUCOSE, POC    Collection Time: 06/30/19  8:30 PM   Result Value Ref Range    Glucose (POC) 135 (H) 65 - 100 mg/dL       Assessment:     Active Problems:    Renal mass, right (6/27/2019)      POD 4:     POSTOPERATIVE DIAGNOSIS:  Right renal mass.     PROCEDURE PERFORMED:  Right hand-assisted laparoscopic partial nephrectomy.        Cn 0.98  Hgb 11.4     Afebrile, VSS      Plan:     Give dulcolax suppository x 1. Continue present treatment. Ambulate today. Awaiting rehab placement. SW arranging. Addendum:  Pt had 2 BM's, dulcolax cancelled. Ninfa Barksdale NP  [facility]    Slowly improving from her nephrectomy. We will continue supportive care and plan discharging in the near future once bowel function and mobility are improved.     Jethro MCCORMICK

## 2019-07-01 NOTE — PROGRESS NOTES
Pt confusion significantly improved this pm. Still experiencing AMS but no longer combative and removing lines. Pt was tearful and anxious once family left for night. Unable to redirect, pt just continued to cry and say the staff was going to hurt her. Medicated once per MAR. Will continue to monitor.

## 2019-07-01 NOTE — PROGRESS NOTES
Patient has been accepted to [facility]. Facility to start pre-cert.  Awaiting approval.

## 2019-07-02 LAB
GLUCOSE BLD STRIP.AUTO-MCNC: 142 MG/DL (ref 65–100)
GLUCOSE BLD STRIP.AUTO-MCNC: 175 MG/DL (ref 65–100)
GLUCOSE BLD STRIP.AUTO-MCNC: 187 MG/DL (ref 65–100)
GLUCOSE BLD STRIP.AUTO-MCNC: 190 MG/DL (ref 65–100)
MM INDURATION POC: 0 MM (ref 0–5)
PPD POC: NEGATIVE NEGATIVE

## 2019-07-02 PROCEDURE — 74011250637 HC RX REV CODE- 250/637: Performed by: UROLOGY

## 2019-07-02 PROCEDURE — 74011636637 HC RX REV CODE- 636/637: Performed by: UROLOGY

## 2019-07-02 PROCEDURE — 77030020263 HC SOL INJ SOD CL0.9% LFCR 1000ML

## 2019-07-02 PROCEDURE — 74011250636 HC RX REV CODE- 250/636: Performed by: UROLOGY

## 2019-07-02 PROCEDURE — 65270000029 HC RM PRIVATE

## 2019-07-02 PROCEDURE — 82962 GLUCOSE BLOOD TEST: CPT

## 2019-07-02 RX ADMIN — Medication 10 ML: at 13:32

## 2019-07-02 RX ADMIN — HYDROCHLOROTHIAZIDE 12.5 MG: 12.5 CAPSULE ORAL at 08:10

## 2019-07-02 RX ADMIN — INSULIN LISPRO 2 UNITS: 100 INJECTION, SOLUTION INTRAVENOUS; SUBCUTANEOUS at 08:10

## 2019-07-02 RX ADMIN — INSULIN LISPRO 2 UNITS: 100 INJECTION, SOLUTION INTRAVENOUS; SUBCUTANEOUS at 22:22

## 2019-07-02 RX ADMIN — VALSARTAN 320 MG: 320 TABLET, FILM COATED ORAL at 08:10

## 2019-07-02 RX ADMIN — PANTOPRAZOLE SODIUM 40 MG: 40 TABLET, DELAYED RELEASE ORAL at 05:45

## 2019-07-02 RX ADMIN — Medication 10 ML: at 05:45

## 2019-07-02 RX ADMIN — SODIUM CHLORIDE 100 ML/HR: 900 INJECTION, SOLUTION INTRAVENOUS at 08:14

## 2019-07-02 RX ADMIN — BUPROPION HYDROCHLORIDE 150 MG: 150 TABLET, EXTENDED RELEASE ORAL at 17:52

## 2019-07-02 RX ADMIN — BUPROPION HYDROCHLORIDE 150 MG: 150 TABLET, EXTENDED RELEASE ORAL at 08:10

## 2019-07-02 RX ADMIN — INSULIN LISPRO 2 UNITS: 100 INJECTION, SOLUTION INTRAVENOUS; SUBCUTANEOUS at 16:40

## 2019-07-02 NOTE — PROGRESS NOTES
Pt remained confused throughout the night. She is currently resting quietly in bed. Hourly rounds made and all needs met at this time.

## 2019-07-02 NOTE — PROGRESS NOTES
Hourly rounds completed this shift. All needs met. Bed low/locked. No c/o pain. Family at bedside. Pt has been alert/oriented this shift. Call light in reach. Will continue to monitor pt and give report to oncoming RN.

## 2019-07-02 NOTE — PROGRESS NOTES
Pt experiencing AMS and upset after family left for the evening, medicated per MAR. Pt is currently sitting up in bed confused and states she is unable to rest. Staff present, will continue to monitor.

## 2019-07-02 NOTE — PROGRESS NOTES
Admit Date: 6/27/2019    Subjective:     Crystal Ruiz is sitting in the chair. No complaints. She has ambulated. She continues to tolerate a regular diet. Objective:     Patient Vitals for the past 8 hrs:   BP Temp Pulse Resp SpO2   07/02/19 0756 161/53 98 °F (36.7 °C) 74 18 97 %   07/02/19 0518 184/70 98.2 °F (36.8 °C) 76 18 95 %     No intake/output data recorded. 06/30 1901 - 07/02 0700  In: -   Out: 2040 [Urine:2040]    Physical Exam:  GENERAL: alert, cooperative, no distress  LUNG: clear to auscultation bilaterally  HEART: regular rate and rhythm, S1, S2   ABDOMEN: soft, non-tender, active BS, staples c/d/i  NEUROLOGIC: AOx3    Data Review   Recent Results (from the past 24 hour(s))   GLUCOSE, POC    Collection Time: 07/01/19 11:49 AM   Result Value Ref Range    Glucose (POC) 171 (H) 65 - 100 mg/dL   GLUCOSE, POC    Collection Time: 07/01/19  3:51 PM   Result Value Ref Range    Glucose (POC) 154 (H) 65 - 100 mg/dL   PLEASE READ & DOCUMENT PPD TEST IN 24 HRS    Collection Time: 07/01/19  5:29 PM   Result Value Ref Range    PPD Negative Negative    mm Induration 0 0 - 5 mm   GLUCOSE, POC    Collection Time: 07/01/19  9:03 PM   Result Value Ref Range    Glucose (POC) 188 (H) 65 - 100 mg/dL   GLUCOSE, POC    Collection Time: 07/02/19  8:00 AM   Result Value Ref Range    Glucose (POC) 190 (H) 65 - 100 mg/dL       Assessment:     Active Problems:    Renal mass, right (6/27/2019)      POD 4:     POSTOPERATIVE DIAGNOSIS:  Right renal mass.     PROCEDURE PERFORMED:  Right hand-assisted laparoscopic partial nephrectomy.        Cn 0.98  Hgb 11.4     Afebrile, VSS    Plan:     D/C to rehab tomorrow. F/U with me next week for staple removal.  We will schedule appt and let her know the date/time. Continue present treatment. Kenzie Campbell NP  [facility]  I have reviewed the above note and examined the patient. I agree with the exam, assessment and plan.     Elías Hamilton MD

## 2019-07-02 NOTE — PROGRESS NOTES
's follow-up visit. Ms. William Arteaga requested a Bible. Provided Bible and spiritual interventions. Ms. William Arteaga expressed appreciation for the visit.       Joey Giles 70  Board Certified

## 2019-07-03 VITALS
TEMPERATURE: 98.7 F | DIASTOLIC BLOOD PRESSURE: 68 MMHG | BODY MASS INDEX: 19.38 KG/M2 | OXYGEN SATURATION: 99 % | HEIGHT: 65 IN | SYSTOLIC BLOOD PRESSURE: 177 MMHG | WEIGHT: 116.3 LBS | HEART RATE: 69 BPM | RESPIRATION RATE: 17 BRPM

## 2019-07-03 LAB
GLUCOSE BLD STRIP.AUTO-MCNC: 150 MG/DL (ref 65–100)
GLUCOSE BLD STRIP.AUTO-MCNC: 161 MG/DL (ref 65–100)

## 2019-07-03 PROCEDURE — 74011636637 HC RX REV CODE- 636/637: Performed by: UROLOGY

## 2019-07-03 PROCEDURE — 82962 GLUCOSE BLOOD TEST: CPT

## 2019-07-03 PROCEDURE — 74011250637 HC RX REV CODE- 250/637: Performed by: UROLOGY

## 2019-07-03 RX ADMIN — INSULIN LISPRO 2 UNITS: 100 INJECTION, SOLUTION INTRAVENOUS; SUBCUTANEOUS at 11:24

## 2019-07-03 RX ADMIN — BUPROPION HYDROCHLORIDE 150 MG: 150 TABLET, EXTENDED RELEASE ORAL at 08:05

## 2019-07-03 RX ADMIN — HYDROCHLOROTHIAZIDE 12.5 MG: 12.5 CAPSULE ORAL at 08:05

## 2019-07-03 RX ADMIN — INSULIN LISPRO 2 UNITS: 100 INJECTION, SOLUTION INTRAVENOUS; SUBCUTANEOUS at 08:04

## 2019-07-03 RX ADMIN — PANTOPRAZOLE SODIUM 40 MG: 40 TABLET, DELAYED RELEASE ORAL at 05:43

## 2019-07-03 RX ADMIN — VALSARTAN 320 MG: 320 TABLET, FILM COATED ORAL at 08:05

## 2019-07-03 NOTE — PROGRESS NOTES
Hourly rounds completed on patient. All needs are met. No complaints at this time. Bed locked in and in low position. Call light within reach. Will report to oncoming nurse at bedside.

## 2019-07-03 NOTE — PROGRESS NOTES
TRANSFER - OUT REPORT:    Verbal report given to Suzan(name) on Jacob Grant  being transferred to [address](unit) for routine progression of care       Report consisted of patients Situation, Background, Assessment and   Recommendations(SBAR). Information from the following report(s) SBAR, Intake/Output and MAR was reviewed with the receiving nurse. Lines:       Opportunity for questions and clarification was provided.       Patient transported with:

## 2019-07-03 NOTE — DISCHARGE SUMMARY
Discharge Summary     Patient: Jojo Wall MRN: [id number]  SSN: [id number]    YOB: 1940  Age: 70 y.o. Sex: female      Allergies: Codeine and Pcn [penicillins]    Admit Date: 6/27/2019    Discharge Date: 7/3/2019     * Admission Diagnoses:  Renal mass [N28.89]  Renal mass, right [N28.89]     * Discharge Diagnoses:   Hospital Problems as of 7/3/2019 Date Reviewed: 6/12/2019          Codes Class Noted - Resolved POA    Renal mass, right ICD-10-CM: N28.89  ICD-9-CM: 593.9  6/27/2019 - Present Unknown               * Procedures for this admission:   Procedure(s):  RIGHT NEPHRECTOMY PARTIAL LAPAROSCOPIC HAND ASSISTED      * Disposition: Home    * Discharged Condition: improved    * Hospital Course:      Ms. Shannon Leo is a 61-year-old female with hx of right renal mass who underwent a right hand-assisted laparoscopic partial nephrectomy by Dr. Delmi Mukherjee on 6/27/19. She eventually resumed bowel function and tolerated regular food. She ambulated with PT. She will need rehab at time of d/c and she has stayed here while waiting for placement. She will d/c today to [facility]. She will RTO for scheduled appt with Dr. Delmi Mukherjee at [facility] office for staple removal and to review pathology report which is listed below. This has not yet been reviewed with pt. DIAGNOSIS   RIGHT RENAL MASS: RENAL CELL CARCINOMA. PROCEDURE: Right excisional biopsy. TUMOR SIZE: 1.4 cm. HISTOLOGIC TYPE: Clear cell renal cell carcinoma. SARCOMATOID FEATURES: Not identified. RHABDOID FEATURES: Not identified. HISTOLOGIC GRADE: G2.   SURGICAL MARGINS: involved by renal cell carcinoma. NECROSIS: Not identified. LYMPHOVASCULAR INVASION: Not identified   Dr. Aditya Soto has also examined this specimen and concurs with the diagnosis.          Patient Active Problem List   Diagnosis Code    Hyponatremia E87.1    Diarrhea R19.7    HTN (hypertension) I10    DM (diabetes mellitus) ([facility] [address] E11.9    UTI (urinary tract infection) N39.0    Encephalopathy L97.21    Metabolic encephalopathy D48.73    Vaginal atrophy N95.2    Renal mass, right N28.89             Discharge Medications:   Current Discharge Medication List      CONTINUE these medications which have NOT CHANGED    Details   hydroCHLOROthiazide (MICROZIDE) 12.5 mg capsule Take 12.5 mg by mouth daily. Do not take morning of procedure. vitamin E acetate (VITAMIN E PO) Take  by mouth. Hold for procedure      valsartan (DIOVAN) 320 mg tablet Take  by mouth daily. Do not take morning of procedure. buPROPion XL (WELLBUTRIN XL) 300 mg XL tablet Take 300 mg by mouth every morning. Take morning of procedure. atorvastatin (LIPITOR) 40 mg tablet Take 40 mg by mouth nightly. omeprazole (PRILOSEC) 40 mg capsule Take 40 mg by mouth daily. Take morning of procedure. insulin NPH/insulin regular (HUMULIN 70/30 U-100 INSULIN) 100 unit/mL (70-30) injection 10 Units by SubCUTAneous route two (2) times a day. Morning before procedure take 80%- adjusted dose = 8 units  Evening before procedure take 80%- adjusted dose= 8 units  Morning of procedure take 50% if glucose level is >120 = 5 units  If glucose <120, then hold              * Follow-up Care/Patient Instructions:   Activity: no heavy lifting, pushing, pulling, avoid straining  Diet: Regular Diet  Wound Care: None needed    Follow-up Information     Follow up With Specialties Details Why Contact Info    [address] [place] [address] Nickolas Arnett MD Internal Medicine   [address] [facility]  [address] [place] [address] [phone]

## 2019-07-03 NOTE — PROGRESS NOTES
Admit Date: 6/27/2019    Subjective:     Nick Tay is doing well. Tolerating food. Voiding. No complaints. Objective:     Patient Vitals for the past 8 hrs:   BP Temp Pulse Resp SpO2 Height Weight   07/03/19 0658 159/64 98.6 °F (37 °C) 60 17 96 %     07/03/19 0415 151/66 98.6 °F (37 °C) 63 17 97 % 5' 5\" (1.651 m) 116 lb 4.8 oz (52.8 kg)     No intake/output data recorded. No intake/output data recorded. Physical Exam:  GENERAL: alert, cooperative, no distress  LUNG: clear to auscultation bilaterally  HEART: regular rate and rhythm, S1, S2   ABDOMEN: soft, non-tender, active BS, staples c/d/i  NEUROLOGIC: AOx3      Data Review   Recent Results (from the past 24 hour(s))   GLUCOSE, POC    Collection Time: 07/02/19 11:28 AM   Result Value Ref Range    Glucose (POC) 142 (H) 65 - 100 mg/dL   GLUCOSE, POC    Collection Time: 07/02/19  4:25 PM   Result Value Ref Range    Glucose (POC) 187 (H) 65 - 100 mg/dL   PLEASE READ & DOCUMENT PPD TEST IN 48 HRS    Collection Time: 07/02/19  4:43 PM   Result Value Ref Range    PPD Negative Negative    mm Induration 0 0 - 5 mm   GLUCOSE, POC    Collection Time: 07/02/19  9:01 PM   Result Value Ref Range    Glucose (POC) 175 (H) 65 - 100 mg/dL   GLUCOSE, POC    Collection Time: 07/03/19  6:59 AM   Result Value Ref Range    Glucose (POC) 161 (H) 65 - 100 mg/dL       Assessment:     Active Problems:    Renal mass, right (6/27/2019)      POD 6:     POSTOPERATIVE DIAGNOSIS:  Right renal mass.     PROCEDURE PERFORMED:  Right hand-assisted laparoscopic partial nephrectomy.        Afebrile, VSS    Plan:     D/c to rehab today. F/u with Dr. Miki Lopez as scheduled for staple removal.         Jeanne Costa NP  [facility]  I have reviewed the above note and examined the patient. I agree with the exam, assessment and plan.     General Shweta MD

## 2019-07-03 NOTE — PROGRESS NOTES
Patient with some mild confusion near end of shift, call bell within reach. Sitting up in bed, no complaints voiced. Hourly rounds completed this shift.

## 2019-07-03 NOTE — PROGRESS NOTES
Pt to DC today to [facility]. Room 217B and report line given to GEMA Nunez to transport-1pm.  Family aware. Care Management Interventions  PCP Verified by CM:  Yes  Mode of Transport at Discharge: Robyn Rain)  Transition of Care Consult (CM Consult): SNF  Partner SNF: Yes  Physical Therapy Consult: Yes  Occupational Therapy Consult: Yes  Speech Therapy Consult: No  Current Support Network: Own Home  Confirm Follow Up Transport: Family  Plan discussed with Pt/Family/Caregiver: Yes  Freedom of Choice Offered: Yes  Discharge Location  Discharge Placement: Skilled nursing facility

## 2019-07-05 ENCOUNTER — PATIENT OUTREACH (OUTPATIENT)
Dept: CASE MANAGEMENT | Age: 79
End: 2019-07-05

## 2019-07-05 NOTE — PROGRESS NOTES
This note will not be viewable in [address] Patient discharged to [facility] on 7/3/19. Patient discharged to a [facility] facility. Patient will be included in weekly care coordination calls. Information forwarded to Petra Herrera RN, [facility] RN Care Manager.

## 2019-07-11 ENCOUNTER — HOME HEALTH ADMISSION (OUTPATIENT)
Dept: HOME HEALTH SERVICES | Facility: HOME HEALTH | Age: 79
End: 2019-07-11
Payer: MEDICARE

## 2019-07-16 ENCOUNTER — HOME CARE VISIT (OUTPATIENT)
Dept: SCHEDULING | Facility: HOME HEALTH | Age: 79
End: 2019-07-16
Payer: MEDICARE

## 2019-07-16 VITALS
RESPIRATION RATE: 18 BRPM | OXYGEN SATURATION: 98 % | DIASTOLIC BLOOD PRESSURE: 54 MMHG | SYSTOLIC BLOOD PRESSURE: 130 MMHG | HEART RATE: 78 BPM | TEMPERATURE: 97.8 F

## 2019-07-16 PROCEDURE — G0299 HHS/HOSPICE OF RN EA 15 MIN: HCPCS

## 2019-07-16 PROCEDURE — 400013 HH SOC

## 2019-07-18 ENCOUNTER — HOME CARE VISIT (OUTPATIENT)
Dept: SCHEDULING | Facility: HOME HEALTH | Age: 79
End: 2019-07-18
Payer: MEDICARE

## 2019-07-18 VITALS
OXYGEN SATURATION: 99 % | RESPIRATION RATE: 16 BRPM | TEMPERATURE: 97.5 F | DIASTOLIC BLOOD PRESSURE: 64 MMHG | HEART RATE: 66 BPM | SYSTOLIC BLOOD PRESSURE: 138 MMHG

## 2019-07-18 VITALS
TEMPERATURE: 97.9 F | SYSTOLIC BLOOD PRESSURE: 148 MMHG | HEART RATE: 60 BPM | RESPIRATION RATE: 18 BRPM | DIASTOLIC BLOOD PRESSURE: 70 MMHG

## 2019-07-18 PROCEDURE — G0152 HHCP-SERV OF OT,EA 15 MIN: HCPCS

## 2019-07-18 PROCEDURE — G0151 HHCP-SERV OF PT,EA 15 MIN: HCPCS

## 2019-07-18 PROCEDURE — G0299 HHS/HOSPICE OF RN EA 15 MIN: HCPCS

## 2019-07-19 VITALS
SYSTOLIC BLOOD PRESSURE: 142 MMHG | TEMPERATURE: 98.2 F | HEART RATE: 88 BPM | RESPIRATION RATE: 18 BRPM | OXYGEN SATURATION: 98 % | DIASTOLIC BLOOD PRESSURE: 60 MMHG

## 2019-07-23 ENCOUNTER — HOME CARE VISIT (OUTPATIENT)
Dept: SCHEDULING | Facility: HOME HEALTH | Age: 79
End: 2019-07-23
Payer: MEDICARE

## 2019-07-23 VITALS
DIASTOLIC BLOOD PRESSURE: 72 MMHG | RESPIRATION RATE: 18 BRPM | TEMPERATURE: 98.2 F | SYSTOLIC BLOOD PRESSURE: 142 MMHG | HEART RATE: 64 BPM

## 2019-07-23 PROCEDURE — G0299 HHS/HOSPICE OF RN EA 15 MIN: HCPCS

## 2019-07-23 PROCEDURE — G0157 HHC PT ASSISTANT EA 15: HCPCS

## 2019-07-24 VITALS
OXYGEN SATURATION: 98 % | DIASTOLIC BLOOD PRESSURE: 76 MMHG | HEART RATE: 87 BPM | SYSTOLIC BLOOD PRESSURE: 142 MMHG | RESPIRATION RATE: 18 BRPM | TEMPERATURE: 98.2 F

## 2019-07-26 ENCOUNTER — HOME CARE VISIT (OUTPATIENT)
Dept: SCHEDULING | Facility: HOME HEALTH | Age: 79
End: 2019-07-26
Payer: MEDICARE

## 2019-07-26 VITALS
HEART RATE: 64 BPM | DIASTOLIC BLOOD PRESSURE: 61 MMHG | RESPIRATION RATE: 17 BRPM | TEMPERATURE: 97.9 F | SYSTOLIC BLOOD PRESSURE: 120 MMHG

## 2019-07-26 PROCEDURE — G0299 HHS/HOSPICE OF RN EA 15 MIN: HCPCS

## 2019-07-26 PROCEDURE — G0151 HHCP-SERV OF PT,EA 15 MIN: HCPCS

## 2019-07-28 VITALS
TEMPERATURE: 98.2 F | RESPIRATION RATE: 18 BRPM | HEART RATE: 82 BPM | SYSTOLIC BLOOD PRESSURE: 128 MMHG | DIASTOLIC BLOOD PRESSURE: 64 MMHG | OXYGEN SATURATION: 98 %

## 2019-07-29 ENCOUNTER — HOME CARE VISIT (OUTPATIENT)
Dept: SCHEDULING | Facility: HOME HEALTH | Age: 79
End: 2019-07-29
Payer: MEDICARE

## 2019-07-29 VITALS
HEART RATE: 66 BPM | SYSTOLIC BLOOD PRESSURE: 140 MMHG | DIASTOLIC BLOOD PRESSURE: 70 MMHG | RESPIRATION RATE: 19 BRPM | TEMPERATURE: 98.7 F

## 2019-07-29 PROCEDURE — G0157 HHC PT ASSISTANT EA 15: HCPCS

## 2019-07-30 ENCOUNTER — HOME CARE VISIT (OUTPATIENT)
Dept: HOME HEALTH SERVICES | Facility: HOME HEALTH | Age: 79
End: 2019-07-30
Payer: MEDICARE

## 2019-08-01 ENCOUNTER — HOME CARE VISIT (OUTPATIENT)
Dept: SCHEDULING | Facility: HOME HEALTH | Age: 79
End: 2019-08-01
Payer: MEDICARE

## 2019-08-01 VITALS
HEART RATE: 70 BPM | DIASTOLIC BLOOD PRESSURE: 70 MMHG | RESPIRATION RATE: 18 BRPM | TEMPERATURE: 98.2 F | SYSTOLIC BLOOD PRESSURE: 151 MMHG

## 2019-08-01 PROCEDURE — G0151 HHCP-SERV OF PT,EA 15 MIN: HCPCS

## 2019-10-09 ENCOUNTER — RX ONLY (OUTPATIENT)
Age: 79
Setting detail: RX ONLY
End: 2019-10-09

## 2019-10-09 RX ORDER — KETOCONAZOLE 20 MG/G
CREAM TOPICAL
Qty: 1 | Refills: 3 | Status: ERX

## 2020-01-10 ENCOUNTER — HOSPITAL ENCOUNTER (OUTPATIENT)
Dept: MRI IMAGING | Age: 80
Discharge: HOME | End: 2020-01-10
Attending: PSYCHIATRY & NEUROLOGY
Payer: MEDICARE

## 2020-01-10 DIAGNOSIS — R41.3 MEMORY LOSS: ICD-10-CM

## 2020-01-10 PROCEDURE — 70551 MRI BRAIN STEM W/O DYE: CPT

## 2020-02-15 ENCOUNTER — HOSPITAL ENCOUNTER (OUTPATIENT)
Dept: CT IMAGING | Age: 80
Discharge: HOME | End: 2020-02-15
Attending: UROLOGY
Payer: MEDICARE

## 2020-02-15 DIAGNOSIS — C64.1 RENAL CANCER, RIGHT (HCC): ICD-10-CM

## 2020-02-15 LAB — CREAT BLD-MCNC: 1.1 MG/DL (ref 0.8–1.5)

## 2020-02-15 PROCEDURE — 74170 CT ABD WO CNTRST FLWD CNTRST: CPT

## 2020-02-15 PROCEDURE — 74011000258 HC RX REV CODE- 258: Performed by: UROLOGY

## 2020-02-15 PROCEDURE — 82565 ASSAY OF CREATININE: CPT

## 2020-02-15 PROCEDURE — 74011636320 HC RX REV CODE- 636/320: Performed by: UROLOGY

## 2020-02-15 RX ORDER — SODIUM CHLORIDE 0.9 % (FLUSH) 0.9 %
10 SYRINGE (ML) INJECTION
Status: COMPLETED | OUTPATIENT
Start: 2020-02-15 | End: 2020-02-15

## 2020-02-15 RX ADMIN — Medication 10 ML: at 08:50

## 2020-02-15 RX ADMIN — SODIUM CHLORIDE 100 ML: 900 INJECTION, SOLUTION INTRAVENOUS at 08:50

## 2020-02-15 RX ADMIN — IOPAMIDOL 100 ML: 755 INJECTION, SOLUTION INTRAVENOUS at 08:50

## 2020-02-20 PROBLEM — N39.0 RECURRENT UTI: Status: ACTIVE | Noted: 2020-02-20

## 2020-02-20 PROBLEM — N39.0 UTI (URINARY TRACT INFECTION): Status: RESOLVED | Noted: 2019-01-09 | Resolved: 2020-02-20

## 2020-03-01 ENCOUNTER — HOSPITAL ENCOUNTER (EMERGENCY)
Age: 80
Discharge: HOME | End: 2020-03-01
Attending: EMERGENCY MEDICINE
Payer: MEDICARE

## 2020-03-01 VITALS
SYSTOLIC BLOOD PRESSURE: 152 MMHG | WEIGHT: 120 LBS | BODY MASS INDEX: 19.99 KG/M2 | HEIGHT: 65 IN | TEMPERATURE: 97.7 F | RESPIRATION RATE: 18 BRPM | HEART RATE: 65 BPM | OXYGEN SATURATION: 100 % | DIASTOLIC BLOOD PRESSURE: 61 MMHG

## 2020-03-01 DIAGNOSIS — N39.0 URINARY TRACT INFECTION WITHOUT HEMATURIA, SITE UNSPECIFIED: Primary | ICD-10-CM

## 2020-03-01 LAB
BACTERIA URNS QL MICRO: 0 /HPF
CASTS URNS QL MICRO: NORMAL /LPF
EPI CELLS #/AREA URNS HPF: NORMAL /HPF
RBC #/AREA URNS HPF: NORMAL /HPF
WBC URNS QL MICRO: NORMAL /HPF

## 2020-03-01 PROCEDURE — 99284 EMERGENCY DEPT VISIT MOD MDM: CPT

## 2020-03-01 PROCEDURE — 81003 URINALYSIS AUTO W/O SCOPE: CPT

## 2020-03-01 PROCEDURE — 87186 SC STD MICRODIL/AGAR DIL: CPT

## 2020-03-01 PROCEDURE — 87088 URINE BACTERIA CULTURE: CPT

## 2020-03-01 PROCEDURE — 81015 MICROSCOPIC EXAM OF URINE: CPT

## 2020-03-01 PROCEDURE — 87086 URINE CULTURE/COLONY COUNT: CPT

## 2020-03-01 RX ORDER — DOXYCYCLINE HYCLATE 100 MG
100 TABLET ORAL 2 TIMES DAILY
Qty: 14 TAB | Refills: 0 | Status: SHIPPED | OUTPATIENT
Start: 2020-03-01 | End: 2020-03-08

## 2020-03-01 NOTE — ED TRIAGE NOTES
Pt states she has been having urinary frequency with burning since yesterday. States she has been admitted for these last year and does not \"want it to get out of hand. \"

## 2020-03-01 NOTE — ED PROVIDER NOTES
57-year-old white female has been having recurrent urinary tract infections over the past year. Her son reports she gets very infection every month or so. She has a history of a renal tumor removal.  Current symptoms began yesterday with urinary frequency. Mild dysuria. No nausea, vomiting, fever, abdominal pain or back pain. She has had some chills but no fever. The history is provided by the patient. Urinary Frequency    Associated symptoms include frequency. Pertinent negatives include no nausea, no vomiting, no abdominal pain and no back pain.         Past Medical History:   Diagnosis Date    Anxiety     Diabetes ([address]     Type 2- 140-160 avg- oral and insulin- hypo below 60 A1C 6.2 1/9/2019    Endocrine disease     Hypertension     Kidney stones     Menopause     Renal mass        Past Surgical History:   Procedure Laterality Date    HX HYSTERECTOMY      HX OOPHORECTOMY      LAP,CHOLECYSTECTOMY      REMOVAL OF KIDNEY STONE           Family History:   Problem Relation Age of Onset    Thyroid Disease Mother     Hypertension Mother     Diabetes Brother     Breast Cancer Neg Hx        Social History     Socioeconomic History    Marital status:      Spouse name: Not on file    Number of children: Not on file    Years of education: Not on file    Highest education level: Not on file   Occupational History    Not on file   Social Needs    Financial resource strain: Not on file    Food insecurity:     Worry: Not on file     Inability: Not on file    Transportation needs:     Medical: Not on file     Non-medical: Not on file   Tobacco Use    Smoking status: Never Smoker    Smokeless tobacco: Never Used   Substance and Sexual Activity    Alcohol use: No     Frequency: Never    Drug use: No    Sexual activity: Not Currently   Lifestyle    Physical activity:     Days per week: Not on file     Minutes per session: Not on file    Stress: Not on file   Relationships    Social connections:     Talks on phone: Not on file     Gets together: Not on file     Attends Orthodoxy service: Not on file     Active member of club or organization: Not on file     Attends meetings of clubs or organizations: Not on file     Relationship status: Not on file    Intimate partner violence:     Fear of current or ex partner: Not on file     Emotionally abused: Not on file     Physically abused: Not on file     Forced sexual activity: Not on file   Other Topics Concern    Not on file   Social History Narrative    Not on file         ALLERGIES: Codeine and Pcn [penicillins]    Review of Systems   Constitutional: Negative for fever. Respiratory: Negative for cough. Cardiovascular: Negative for chest pain. Gastrointestinal: Negative for abdominal pain, nausea and vomiting. Genitourinary: Positive for frequency. Musculoskeletal: Negative for back pain and neck pain. Skin: Negative for rash. Neurological: Negative for headaches. Vitals:    03/01/20 1801   BP: 152/61   Pulse: 65   Resp: 16   Temp: 97.7 °F (36.5 °C)   SpO2: 100%   Weight: 54.4 kg (120 lb)   Height: 5' 5\" (1.651 m)            Physical Exam  Vitals signs and nursing note reviewed. Constitutional:       General: She is not in acute distress. Appearance: Normal appearance. She is not toxic-appearing. HENT:      Head: Normocephalic and atraumatic. Nose: Nose normal.      Mouth/Throat:      Mouth: Mucous membranes are moist.      Pharynx: Oropharynx is clear. Eyes:      Conjunctiva/sclera: Conjunctivae normal.      Pupils: Pupils are equal, round, and reactive to light. Neck:      Musculoskeletal: Normal range of motion and neck supple. Cardiovascular:      Rate and Rhythm: Normal rate and regular rhythm. Heart sounds: No murmur. Pulmonary:      Effort: Pulmonary effort is normal.      Breath sounds: Normal breath sounds. Abdominal:      General: There is no distension. Palpations: Abdomen is soft. Tenderness: There is no abdominal tenderness. There is no right CVA tenderness or left CVA tenderness. Musculoskeletal: Normal range of motion. Skin:     General: Skin is warm and dry. Neurological:      Mental Status: She is alert and oriented to person, place, and time. Psychiatric:         Mood and Affect: Mood normal.         Behavior: Behavior normal.          MDM  Number of Diagnoses or Management Options  Diagnosis management comments:  white cells but no bacteria. We will culture. Review of previous urine culture shows Klebsiella that is sensitive to tetracycline. Will treat with doxycycline. Afebrile and nontoxic and appears safe for outpatient management.        Amount and/or Complexity of Data Reviewed  Clinical lab tests: ordered and reviewed    Risk of Complications, Morbidity, and/or Mortality  Presenting problems: low  Diagnostic procedures: low  Management options: low           Procedures

## 2020-03-01 NOTE — ED NOTES
I have reviewed discharge instructions with the patient. The patient verbalized understanding. Patient left ED via Discharge Method: ambulatory to Home with son. Opportunity for questions and clarification provided. Patient given 1 scripts. To continue your aftercare when you leave the hospital, you may receive an automated call from our care team to check in on how you are doing. This is a free service and part of our promise to provide the best care and service to meet your aftercare needs.  If you have questions, or wish to unsubscribe from this service please call [phone]. Thank you for Choosing our [facility] Emergency Department.

## 2020-03-01 NOTE — DISCHARGE INSTRUCTIONS

## 2020-03-04 LAB
BACTERIA SPEC CULT: ABNORMAL
BACTERIA SPEC CULT: ABNORMAL
SERVICE CMNT-IMP: ABNORMAL

## 2020-05-15 ENCOUNTER — HOSPITAL ENCOUNTER (OUTPATIENT)
Dept: CT IMAGING | Age: 80
Discharge: HOME | End: 2020-05-15
Attending: UROLOGY
Payer: MEDICARE

## 2020-05-15 DIAGNOSIS — C64.1 RENAL CANCER, RIGHT (HCC): ICD-10-CM

## 2020-05-15 LAB — CREAT BLD-MCNC: 0.9 MG/DL (ref 0.8–1.5)

## 2020-05-15 PROCEDURE — 74170 CT ABD WO CNTRST FLWD CNTRST: CPT

## 2020-05-15 PROCEDURE — 74011000258 HC RX REV CODE- 258: Performed by: UROLOGY

## 2020-05-15 PROCEDURE — 82565 ASSAY OF CREATININE: CPT

## 2020-05-15 PROCEDURE — 74011636320 HC RX REV CODE- 636/320: Performed by: UROLOGY

## 2020-05-15 RX ORDER — SODIUM CHLORIDE 0.9 % (FLUSH) 0.9 %
10 SYRINGE (ML) INJECTION
Status: COMPLETED | OUTPATIENT
Start: 2020-05-15 | End: 2020-05-15

## 2020-05-15 RX ADMIN — IOPAMIDOL 100 ML: 755 INJECTION, SOLUTION INTRAVENOUS at 10:13

## 2020-05-15 RX ADMIN — SODIUM CHLORIDE 100 ML: 900 INJECTION, SOLUTION INTRAVENOUS at 10:13

## 2020-05-15 RX ADMIN — Medication 10 ML: at 10:13

## 2020-08-11 NOTE — PROGRESS NOTES
"Radiology Post-Procedure Note    Pre Op Diagnosis: Elevated LFTs  Post Op Diagnosis: Same    Procedure: US guided live rbiopsy    Procedure performed by: Dot    Written Informed Consent Obtained: Yes  Specimen Removed: YES 3 cores  Estimated Blood Loss: Minimal    Findings:   three 18g cores traken    Patient tolerated procedure well.    Nilton Chris MD ([facility])  Interventional Radiology  [phone]      " Left a message on  voice mail for a return call on Harsh Wilcoxs via verified voice mail.     TRANSFER - IN REPORT: 
 
Verbal report received from RN on Eva Plummer  being received from ER(unit) for routine progression of care Report consisted of patients Situation, Background, Assessment and  
Recommendations(SBAR). Information from the following report(s) ED Summary, Intake/Output and MAR was reviewed with the receiving nurse. Opportunity for questions and clarification was provided. Assessment to be completed upon patients arrival to unit and care assumed.

## 2020-09-22 ENCOUNTER — HOSPITAL ENCOUNTER (EMERGENCY)
Age: 80
Discharge: HOME | End: 2020-09-22
Attending: EMERGENCY MEDICINE
Payer: MEDICARE

## 2020-09-22 VITALS
RESPIRATION RATE: 16 BRPM | HEIGHT: 65 IN | WEIGHT: 125 LBS | OXYGEN SATURATION: 99 % | DIASTOLIC BLOOD PRESSURE: 61 MMHG | HEART RATE: 72 BPM | BODY MASS INDEX: 20.83 KG/M2 | TEMPERATURE: 98.1 F | SYSTOLIC BLOOD PRESSURE: 130 MMHG

## 2020-09-22 DIAGNOSIS — T88.7XXA MEDICATION SIDE EFFECT: ICD-10-CM

## 2020-09-22 DIAGNOSIS — N30.00 ACUTE CYSTITIS WITHOUT HEMATURIA: Primary | ICD-10-CM

## 2020-09-22 DIAGNOSIS — F41.1 ANXIETY STATE: ICD-10-CM

## 2020-09-22 LAB
ALBUMIN SERPL-MCNC: 4.7 G/DL (ref 3.2–4.6)
ALBUMIN/GLOB SERPL: 1.2 {RATIO} (ref 1.2–3.5)
ALP SERPL-CCNC: 123 U/L (ref 50–136)
ALT SERPL-CCNC: 55 U/L (ref 12–65)
ANION GAP SERPL CALC-SCNC: 9 MMOL/L (ref 7–16)
APPEARANCE UR: ABNORMAL
AST SERPL-CCNC: 27 U/L (ref 15–37)
BACTERIA URNS QL MICRO: NORMAL /HPF
BILIRUB SERPL-MCNC: 0.9 MG/DL (ref 0.2–1.1)
BILIRUB UR QL: NEGATIVE
BUN SERPL-MCNC: 23 MG/DL (ref 8–23)
CALCIUM SERPL-MCNC: 10.7 MG/DL (ref 8.3–10.4)
CASTS URNS QL MICRO: NORMAL /LPF
CHLORIDE SERPL-SCNC: 100 MMOL/L (ref 98–107)
CO2 SERPL-SCNC: 23 MMOL/L (ref 21–32)
COLOR UR: YELLOW
CREAT SERPL-MCNC: 1.32 MG/DL (ref 0.6–1)
CRYSTALS URNS QL MICRO: 0 /LPF
EPI CELLS #/AREA URNS HPF: NORMAL /HPF
ERYTHROCYTE [DISTWIDTH] IN BLOOD BY AUTOMATED COUNT: 12.1 %
GLOBULIN SER CALC-MCNC: 4 G/DL
GLUCOSE SERPL-MCNC: 395 MG/DL (ref 65–100)
GLUCOSE UR STRIP.AUTO-MCNC: >=1000 MG/DL
HCT VFR BLD AUTO: 42.7 % (ref 35.8–46.3)
HGB BLD-MCNC: 14.5 G/DL (ref 11.7–15.4)
HGB UR QL STRIP: ABNORMAL
KETONES UR QL STRIP.AUTO: NEGATIVE MG/DL
LEUKOCYTE ESTERASE UR QL STRIP.AUTO: ABNORMAL
MCH RBC QN AUTO: 31.8 PG (ref 26.1–32.9)
MCHC RBC AUTO-ENTMCNC: 34 G/DL (ref 31.4–35)
MCV RBC AUTO: 93.6 FL (ref 79.6–97.8)
MUCOUS THREADS URNS QL MICRO: 0 /LPF
NITRITE UR QL STRIP.AUTO: NEGATIVE
NRBC # BLD: 0 K/UL (ref 0–0.2)
PH UR STRIP: 6 [PH] (ref 5–9)
PLATELET # BLD AUTO: 247 K/UL
PMV BLD AUTO: 11.7 FL (ref 9.4–12.3)
POTASSIUM SERPL-SCNC: 4.2 MMOL/L (ref 3.5–5.1)
PROT SERPL-MCNC: 8.7 G/DL (ref 6.3–8.2)
PROT UR STRIP-MCNC: NEGATIVE MG/DL
RBC # BLD AUTO: 4.56 M/UL
RBC #/AREA URNS HPF: NORMAL /HPF
SODIUM SERPL-SCNC: 132 MMOL/L (ref 136–145)
SP GR UR REFRACTOMETRY: 1.01 (ref 1–1.02)
UROBILINOGEN UR QL STRIP.AUTO: 0.2 EU/DL (ref 0.2–1)
WBC # BLD AUTO: 8.5 K/UL (ref 4.3–11.1)
WBC URNS QL MICRO: >100 /HPF

## 2020-09-22 PROCEDURE — 99284 EMERGENCY DEPT VISIT MOD MDM: CPT

## 2020-09-22 PROCEDURE — 99285 EMERGENCY DEPT VISIT HI MDM: CPT

## 2020-09-22 PROCEDURE — 81003 URINALYSIS AUTO W/O SCOPE: CPT

## 2020-09-22 PROCEDURE — 80053 COMPREHEN METABOLIC PANEL: CPT

## 2020-09-22 PROCEDURE — 81015 MICROSCOPIC EXAM OF URINE: CPT

## 2020-09-22 PROCEDURE — 87186 SC STD MICRODIL/AGAR DIL: CPT

## 2020-09-22 PROCEDURE — 87088 URINE BACTERIA CULTURE: CPT

## 2020-09-22 PROCEDURE — 85027 COMPLETE CBC AUTOMATED: CPT

## 2020-09-22 PROCEDURE — 87086 URINE CULTURE/COLONY COUNT: CPT

## 2020-09-22 RX ORDER — CEFDINIR 300 MG/1
300 CAPSULE ORAL 2 TIMES DAILY
Qty: 14 CAP | Refills: 0 | Status: SHIPPED | OUTPATIENT
Start: 2020-09-22 | End: 2020-09-29

## 2020-09-22 NOTE — ED PROVIDER NOTES
Patient began feeling strangely after taking prednisone and Benadryl this morning as part of a 13-hour protocol for IV contrast dye. She was scheduled for a renal mass protocol CT with and without this morning by urology. Apparently she did not take her dose last night and was instructed to take her dose this morning and again throughout the day and this evening per protocol, however she began feeling funny after taking the medicine and they were concerned about allergic reaction. There is no skin rash chest pain trouble breathing or wheezing. Patient complains of feeling like she has a bladder infection with burning and pain when she pees and some discomfort over her bladder area. The history is provided by the EMS personnel. Allergic Reaction    This is a new problem. The current episode started less than 1 hour ago. Ingested substance: Prednisone and Benadryl. Pertinent negatives include no nausea and no vomiting.         Past Medical History:   Diagnosis Date    Anxiety     Diabetes ([facility] [address]     Type 2- 140-160 avg- oral and insulin- hypo below 60 A1C 6.2 1/9/2019    Endocrine disease     Hypertension     Kidney stones     Menopause     Renal mass        Past Surgical History:   Procedure Laterality Date    HX HYSTERECTOMY      HX OOPHORECTOMY      LAP,CHOLECYSTECTOMY      REMOVAL OF KIDNEY STONE           Family History:   Problem Relation Age of Onset    Thyroid Disease Mother     Hypertension Mother     Diabetes Brother     Breast Cancer Neg Hx        Social History     Socioeconomic History    Marital status:      Spouse name: Not on file    Number of children: Not on file    Years of education: Not on file    Highest education level: Not on file   Occupational History    Not on file   Social Needs    Financial resource strain: Not on file    Food insecurity     Worry: Not on file     Inability: Not on file    Transportation needs     Medical: Not on file     Non-medical: Not on file   Tobacco Use    Smoking status: Never Smoker    Smokeless tobacco: Never Used   Substance and Sexual Activity    Alcohol use: No     Frequency: Never    Drug use: No    Sexual activity: Not Currently   Lifestyle    Physical activity     Days per week: Not on file     Minutes per session: Not on file    Stress: Not on file   Relationships    Social connections     Talks on phone: Not on file     Gets together: Not on file     Attends Sikhism service: Not on file     Active member of club or organization: Not on file     Attends meetings of clubs or organizations: Not on file     Relationship status: Not on file    Intimate partner violence     Fear of current or ex partner: Not on file     Emotionally abused: Not on file     Physically abused: Not on file     Forced sexual activity: Not on file   Other Topics Concern    Not on file   Social History Narrative    Not on file         ALLERGIES: Codeine; Contrast agent [iodine]; and Pcn [penicillins]    Review of Systems   Constitutional: Negative for fever. HENT: Negative for facial swelling. Respiratory: Negative for wheezing. Gastrointestinal: Positive for abdominal pain. Negative for diarrhea, nausea and vomiting. Genitourinary: Positive for dysuria, frequency and urgency. Musculoskeletal: Negative for back pain. Skin: Negative for rash. Vitals:    09/22/20 1216   BP: (!) 164/74   Pulse: 70   Resp: 16   Temp: 97.8 °F (36.6 °C)   SpO2: 100%   Weight: 56.7 kg (125 lb)   Height: 5' 5\" (1.651 m)            Physical Exam  Vitals signs and nursing note reviewed. Constitutional:       General: She is not in acute distress. Appearance: She is well-developed. HENT:      Head: Normocephalic. Eyes:      Pupils: Pupils are equal, round, and reactive to light. Cardiovascular:      Rate and Rhythm: Normal rate and regular rhythm. Heart sounds: Normal heart sounds.    Pulmonary:      Effort: Pulmonary effort is normal. Breath sounds: Normal breath sounds. Abdominal:      General: There is no distension. Palpations: Abdomen is soft. There is no mass. Tenderness: There is abdominal tenderness ( Mild suprapubic tenderness). There is no guarding or rebound. Musculoskeletal: Normal range of motion. Lymphadenopathy:      Cervical: No cervical adenopathy. Skin:     General: Skin is warm and dry. Neurological:      Mental Status: She is alert. Sensory: No sensory deficit. Motor: No weakness. Comments: Alert and oriented x2          MDM  Number of Diagnoses or Management Options  Diagnosis management comments: We will do a straight cath urine and check for UTI. Check basic labs. Plan on discharge back to nursing home to continue premedication for CT scan tomorrow. There is no sign of allergic reaction, however there may have been medication side effects and/or anxiety secondary to dementia. 1:39 PM  Still no sign of allergic reaction. Treat acute cystitis. Urine culture will be added on.        Amount and/or Complexity of Data Reviewed  Clinical lab tests: ordered and reviewed (Results for orders placed or performed during the hospital encounter of 09/22/20  -URINALYSIS W/ RFLX MICROSCOPIC       Result                      Value             Ref Range           Color                       YELLOW                                Appearance                  CLOUDY                                Specific gravity            1.012             1.001 - 1.02*       pH (UA)                     6.0               5.0 - 9.0           Protein                     Negative          NEG mg/dL           Glucose                     >=1000            NEG mg/dL           Ketone                      Negative          NEG mg/dL           Bilirubin                   Negative          NEG                 Blood                       TRACE (A)         NEG                 Urobilinogen                0.2               0.2 - 1.0 EU*       Nitrites                    Negative          NEG                 Leukocyte Esterase          MODERATE (A)      NEG            -CBC W/O DIFF       Result                      Value             Ref Range           WBC                         8.5               4.3 - 11.1 K*       RBC                         4.56              M/uL                HGB                         14.5              11.7 - 15.4 *       HCT                         42.7              35.8 - 46.3 %       MCV                         93.6              79.6 - 97.8 *       MCH                         31.8              26.1 - 32.9 *       MCHC                        34.0              31.4 - 35.0 *       RDW                         12.1              %                   PLATELET                    247               K/uL                MPV                         11.7              9.4 - 12.3 FL       ABSOLUTE NRBC               0.00              0.0 - 0.2 K/*  -URINE MICROSCOPIC       Result                      Value             Ref Range           WBC                         >100              0 /hpf              RBC                         10-20             0 /hpf              Epithelial cells            0-3               0 /hpf              Bacteria                    TRACE             0 /hpf              Casts                       HYALINE           0 /lpf              Crystals, urine             0                 0 /LPF              Mucus                       0                 0 /lpf         )    Risk of Complications, Morbidity, and/or Mortality  Presenting problems: low  Diagnostic procedures: low  Management options: low           Procedures

## 2020-09-22 NOTE — ED NOTES
I have reviewed discharge instructions with the patient. The patient verbalized understanding. Patient left ED via Discharge Method: ambulatory to Home with self. Opportunity for questions and clarification provided. Patient given 1 scripts. To continue your aftercare when you leave the hospital, you may receive an automated call from our care team to check in on how you are doing. This is a free service and part of our promise to provide the best care and service to meet your aftercare needs.  If you have questions, or wish to unsubscribe from this service please call [phone]. Thank you for Choosing our [address] [place] [address] Emergency Department.

## 2020-09-22 NOTE — DISCHARGE INSTRUCTIONS
Patient Education   Omnicef twice daily starting this evening until complete for bladder infection. Continue premedication with prednisone and Benadryl for your renal mass CT scan-call urology for details. Return if any new, worsening or concerning symptoms. Urinary Tract Infection in Women: Care Instructions  Your Care Instructions     A urinary tract infection, or UTI, is a general term for an infection anywhere between the kidneys and the urethra (where urine comes out). Most UTIs are bladder infections. They often cause pain or burning when you urinate. UTIs are caused by bacteria and can be cured with antibiotics. Be sure to complete your treatment so that the infection goes away. Follow-up care is a key part of your treatment and safety. Be sure to make and go to all appointments, and call your doctor if you are having problems. It's also a good idea to know your test results and keep a list of the medicines you take. How can you care for yourself at home? · Take your antibiotics as directed. Do not stop taking them just because you feel better. You need to take the full course of antibiotics. · Drink extra water and other fluids for the next day or two. This may help wash out the bacteria that are causing the infection. (If you have kidney, heart, or liver disease and have to limit fluids, talk with your doctor before you increase your fluid intake.)  · Avoid drinks that are carbonated or have caffeine. They can irritate the bladder. · Urinate often. Try to empty your bladder each time. · To relieve pain, take a hot bath or lay a heating pad set on low over your lower belly or genital area. Never go to sleep with a heating pad in place. To prevent UTIs  · Drink plenty of water each day. This helps you urinate often, which clears bacteria from your system.  (If you have kidney, heart, or liver disease and have to limit fluids, talk with your doctor before you increase your fluid intake.)  · Urinate when you need to. · Urinate right after you have sex. · Change sanitary pads often. · Avoid douches, bubble baths, feminine hygiene sprays, and other feminine hygiene products that have deodorants. · After going to the bathroom, wipe from front to back. When should you call for help? Call your doctor now or seek immediate medical care if:    · Symptoms such as fever, chills, nausea, or vomiting get worse or appear for the first time.     · You have new pain in your back just below your rib cage. This is called flank pain.     · There is new blood or pus in your urine.     · You have any problems with your antibiotic medicine. Watch closely for changes in your health, and be sure to contact your doctor if:    · You are not getting better after taking an antibiotic for 2 days.     · Your symptoms go away but then come back. Where can you learn more? Go to http://luz marina-norris.info/  Enter H407 in the search box to learn more about \"Urinary Tract Infection in Women: Care Instructions. \"  Current as of: June 29, 2020               Content Version: 12.6  © 9754-1972 [employer]. Care instructions adapted under license by [employer] (which disclaims liability or warranty for this information). If you have questions about a medical condition or this instruction, always ask your healthcare professional. [place] [address] any warranty or liability for your use of this information. Patient Education     Side Effects of Medicine: Care Instructions  Your Care Instructions  Medicines are a big part of treatment for many health problems. But all medicines have side effects. Often these are mild problems. They might include a dry mouth or upset stomach. But sometimes medicines can cause dangerous side effects. One example is a bad allergic reaction. The best treatment will depend on what side effects you have.  If you have a serious side effect, you may need to stop taking the medicine. You may also need to take another medicine to treat the side effect. If you have a mild side effect, it may go away after you take the medicine for a while. The doctor has checked you carefully, but problems can develop later. If you notice any problems or new symptoms, get medical treatment right away. Follow-up care is a key part of your treatment and safety. Be sure to make and go to all appointments, and call your doctor if you are having problems. It's also a good idea to know your test results and keep a list of the medicines you take. How can you care for yourself at home? · Be safe with medicines. Take your medicines exactly as prescribed. Call your doctor if you think you are having a problem with your medicine. · Call your doctor if side effects bother you and you wonder if you should keep taking a medicine. Your doctor may be able to lower your dose or change your medicine. Do not suddenly quit taking your medicine unless a doctor tells you to. · Make sure your doctor has a list of all the medicines, vitamins, supplements, and herbal remedies you take. Ask about side effects. When should you call for help? Call 911 anytime you think you may need emergency care. For example, call if:  · You have symptoms of a severe allergic reaction. These may include:  ¨ Sudden raised, red areas (hives) all over your body. ¨ Swelling of the throat, mouth, lips, or tongue. ¨ Trouble breathing. ¨ Passing out (losing consciousness). Or you may feel very lightheaded or suddenly feel weak, confused, or restless. Call your doctor now or seek immediate medical care if:  · You have symptoms of an allergic reaction, such as:  ¨ A rash or hives (raised, red areas on the skin). ¨ Itching. ¨ Swelling. ¨ Belly pain, nausea, or vomiting.   Watch closely for changes in your health, and be sure to contact your doctor if:  · You think you are having a new problem with your medicine. · You do not get better as expected. Where can you learn more? Go to [employer].be  Enter D152 in the search box to learn more about \"Side Effects of Medicine: Care Instructions. \"   © 8611-3674 [employer]. Care instructions adapted under license by [address] [place] [address] (which disclaims liability or warranty for this information). This care instruction is for use with your licensed healthcare professional. If you have questions about a medical condition or this instruction, always ask your healthcare professional. [address] any warranty or liability for your use of this information.   Content Version: 41.6.936457; Current as of: November 20, 2015

## 2020-09-22 NOTE — ED TRIAGE NOTES
Patient comes via ems co having allergic reaction either to prednisone or benadryl. Pt took medication to prep for contrast ct but states did not take prep meds with enough time so ct was not performed.   Pt took benadryl and prednisone around 7 am and states began to feel funny around 10 ma

## 2020-09-26 LAB
BACTERIA SPEC CULT: ABNORMAL
BACTERIA SPEC CULT: ABNORMAL
SERVICE CMNT-IMP: ABNORMAL

## 2020-10-19 ENCOUNTER — HOSPITAL ENCOUNTER (OUTPATIENT)
Dept: CT IMAGING | Age: 80
Discharge: HOME | End: 2020-10-19
Attending: UROLOGY
Payer: MEDICARE

## 2020-10-19 DIAGNOSIS — N28.89 RIGHT RENAL MASS: ICD-10-CM

## 2020-10-19 PROCEDURE — 74170 CT ABD WO CNTRST FLWD CNTRST: CPT

## 2020-10-19 PROCEDURE — 74011000636 HC RX REV CODE- 636: Performed by: UROLOGY

## 2020-10-19 PROCEDURE — 74011000258 HC RX REV CODE- 258: Performed by: UROLOGY

## 2020-10-19 RX ORDER — SODIUM CHLORIDE 0.9 % (FLUSH) 0.9 %
10 SYRINGE (ML) INJECTION
Status: COMPLETED | OUTPATIENT
Start: 2020-10-19 | End: 2020-10-19

## 2020-10-19 RX ADMIN — SODIUM CHLORIDE 100 ML: 900 INJECTION, SOLUTION INTRAVENOUS at 08:54

## 2020-10-19 RX ADMIN — IOPAMIDOL 100 ML: 755 INJECTION, SOLUTION INTRAVENOUS at 08:54

## 2020-10-19 RX ADMIN — Medication 10 ML: at 08:54

## 2022-01-15 ENCOUNTER — HOSPITAL ENCOUNTER (INPATIENT)
Age: 82
LOS: 5 days | Discharge: NURSING, REHAB OR LONG-TERM CARE | DRG: 689 | End: 2022-01-20
Attending: EMERGENCY MEDICINE | Admitting: FAMILY MEDICINE
Payer: MEDICARE

## 2022-01-15 DIAGNOSIS — R41.82 ALTERED MENTAL STATUS, UNSPECIFIED ALTERED MENTAL STATUS TYPE: ICD-10-CM

## 2022-01-15 DIAGNOSIS — F03.90 DEMENTIA WITHOUT BEHAVIORAL DISTURBANCE, UNSPECIFIED DEMENTIA TYPE: ICD-10-CM

## 2022-01-15 DIAGNOSIS — N39.0 ACUTE UTI: Primary | ICD-10-CM

## 2022-01-15 DIAGNOSIS — Z51.5 ENCOUNTER FOR PALLIATIVE CARE: ICD-10-CM

## 2022-01-15 DIAGNOSIS — R54 FRAILTY: ICD-10-CM

## 2022-01-15 PROBLEM — N17.9 AKI (ACUTE KIDNEY INJURY) (HCC): Status: ACTIVE | Noted: 2022-01-15

## 2022-01-15 LAB
ALBUMIN SERPL-MCNC: 4 G/DL (ref 3.2–4.6)
ALBUMIN/GLOB SERPL: 1.1 {RATIO} (ref 1.2–3.5)
ALP SERPL-CCNC: 93 U/L (ref 50–130)
ALT SERPL-CCNC: 38 U/L (ref 12–65)
ANION GAP SERPL CALC-SCNC: 4 MMOL/L (ref 7–16)
AST SERPL-CCNC: 26 U/L (ref 15–37)
BACTERIA URNS QL MICRO: ABNORMAL /HPF
BASOPHILS # BLD: 0.1 K/UL (ref 0–0.2)
BASOPHILS NFR BLD: 1 % (ref 0–2)
BILIRUB SERPL-MCNC: 0.6 MG/DL (ref 0.2–1.1)
BUN SERPL-MCNC: 28 MG/DL (ref 8–23)
CALCIUM SERPL-MCNC: 10 MG/DL (ref 8.3–10.4)
CASTS URNS QL MICRO: ABNORMAL /LPF
CHLORIDE SERPL-SCNC: 107 MMOL/L (ref 98–107)
CO2 SERPL-SCNC: 25 MMOL/L (ref 21–32)
COVID-19 RAPID TEST, COVR: NOT DETECTED
CREAT SERPL-MCNC: 1.3 MG/DL (ref 0.6–1)
CRYSTALS URNS QL MICRO: 0 /LPF
DIFFERENTIAL METHOD BLD: NORMAL
EOSINOPHIL # BLD: 0.1 K/UL (ref 0–0.8)
EOSINOPHIL NFR BLD: 2 % (ref 0.5–7.8)
EPI CELLS #/AREA URNS HPF: ABNORMAL /HPF
ERYTHROCYTE [DISTWIDTH] IN BLOOD BY AUTOMATED COUNT: 12.7 % (ref 11.9–14.6)
GLOBULIN SER CALC-MCNC: 3.6 G/DL (ref 2.3–3.5)
GLUCOSE SERPL-MCNC: 246 MG/DL (ref 65–100)
HCT VFR BLD AUTO: 40.8 % (ref 35.8–46.3)
HGB BLD-MCNC: 13.6 G/DL (ref 11.7–15.4)
IMM GRANULOCYTES # BLD AUTO: 0 K/UL (ref 0–0.5)
IMM GRANULOCYTES NFR BLD AUTO: 1 % (ref 0–5)
LYMPHOCYTES # BLD: 2 K/UL (ref 0.5–4.6)
LYMPHOCYTES NFR BLD: 23 % (ref 13–44)
MCH RBC QN AUTO: 30.8 PG (ref 26.1–32.9)
MCHC RBC AUTO-ENTMCNC: 33.3 G/DL (ref 31.4–35)
MCV RBC AUTO: 92.5 FL (ref 79.6–97.8)
MONOCYTES # BLD: 0.5 K/UL (ref 0.1–1.3)
MONOCYTES NFR BLD: 6 % (ref 4–12)
MUCOUS THREADS URNS QL MICRO: 0 /LPF
NEUTS SEG # BLD: 5.9 K/UL (ref 1.7–8.2)
NEUTS SEG NFR BLD: 67 % (ref 43–78)
NRBC # BLD: 0 K/UL (ref 0–0.2)
OTHER OBSERVATIONS,UCOM: ABNORMAL
PLATELET # BLD AUTO: 257 K/UL (ref 150–450)
PMV BLD AUTO: 10.9 FL (ref 9.4–12.3)
POTASSIUM SERPL-SCNC: 4.6 MMOL/L (ref 3.5–5.1)
PROCALCITONIN SERPL-MCNC: <0.05 NG/ML (ref 0–0.49)
PROT SERPL-MCNC: 7.6 G/DL (ref 6.3–8.2)
RBC # BLD AUTO: 4.41 M/UL (ref 4.05–5.2)
RBC #/AREA URNS HPF: ABNORMAL /HPF
SODIUM SERPL-SCNC: 136 MMOL/L (ref 136–145)
SOURCE, COVRS: NORMAL
WBC # BLD AUTO: 8.7 K/UL (ref 4.3–11.1)
WBC URNS QL MICRO: ABNORMAL /HPF

## 2022-01-15 PROCEDURE — 96365 THER/PROPH/DIAG IV INF INIT: CPT

## 2022-01-15 PROCEDURE — 84145 PROCALCITONIN (PCT): CPT

## 2022-01-15 PROCEDURE — 65270000029 HC RM PRIVATE

## 2022-01-15 PROCEDURE — 74011250636 HC RX REV CODE- 250/636: Performed by: FAMILY MEDICINE

## 2022-01-15 PROCEDURE — 74011250636 HC RX REV CODE- 250/636: Performed by: EMERGENCY MEDICINE

## 2022-01-15 PROCEDURE — 74011250637 HC RX REV CODE- 250/637: Performed by: FAMILY MEDICINE

## 2022-01-15 PROCEDURE — 81015 MICROSCOPIC EXAM OF URINE: CPT

## 2022-01-15 PROCEDURE — 87040 BLOOD CULTURE FOR BACTERIA: CPT

## 2022-01-15 PROCEDURE — 99284 EMERGENCY DEPT VISIT MOD MDM: CPT

## 2022-01-15 PROCEDURE — 85025 COMPLETE CBC W/AUTO DIFF WBC: CPT

## 2022-01-15 PROCEDURE — 80053 COMPREHEN METABOLIC PANEL: CPT

## 2022-01-15 PROCEDURE — 87635 SARS-COV-2 COVID-19 AMP PRB: CPT

## 2022-01-15 PROCEDURE — 81003 URINALYSIS AUTO W/O SCOPE: CPT

## 2022-01-15 PROCEDURE — 74011000250 HC RX REV CODE- 250: Performed by: FAMILY MEDICINE

## 2022-01-15 PROCEDURE — 74011000258 HC RX REV CODE- 258: Performed by: EMERGENCY MEDICINE

## 2022-01-15 RX ORDER — BUPROPION HYDROCHLORIDE 150 MG/1
150 TABLET, EXTENDED RELEASE ORAL 2 TIMES DAILY
Status: DISCONTINUED | OUTPATIENT
Start: 2022-01-15 | End: 2022-01-20 | Stop reason: HOSPADM

## 2022-01-15 RX ORDER — ALPRAZOLAM 0.5 MG/1
0.25 TABLET ORAL
Status: DISCONTINUED | OUTPATIENT
Start: 2022-01-15 | End: 2022-01-20 | Stop reason: HOSPADM

## 2022-01-15 RX ORDER — ENOXAPARIN SODIUM 100 MG/ML
40 INJECTION SUBCUTANEOUS DAILY
Status: DISCONTINUED | OUTPATIENT
Start: 2022-01-16 | End: 2022-01-20 | Stop reason: HOSPADM

## 2022-01-15 RX ORDER — SODIUM CHLORIDE 0.9 % (FLUSH) 0.9 %
5-40 SYRINGE (ML) INJECTION EVERY 8 HOURS
Status: DISCONTINUED | OUTPATIENT
Start: 2022-01-15 | End: 2022-01-20 | Stop reason: HOSPADM

## 2022-01-15 RX ORDER — POLYETHYLENE GLYCOL 3350 17 G/17G
17 POWDER, FOR SOLUTION ORAL DAILY PRN
Status: DISCONTINUED | OUTPATIENT
Start: 2022-01-15 | End: 2022-01-20 | Stop reason: HOSPADM

## 2022-01-15 RX ORDER — SPIRONOLACTONE 25 MG/1
25 TABLET ORAL DAILY
Status: DISCONTINUED | OUTPATIENT
Start: 2022-01-16 | End: 2022-01-20 | Stop reason: HOSPADM

## 2022-01-15 RX ORDER — ONDANSETRON 4 MG/1
4 TABLET, ORALLY DISINTEGRATING ORAL
Status: DISCONTINUED | OUTPATIENT
Start: 2022-01-15 | End: 2022-01-20 | Stop reason: HOSPADM

## 2022-01-15 RX ORDER — VALSARTAN 320 MG/1
320 TABLET ORAL DAILY
Status: DISCONTINUED | OUTPATIENT
Start: 2022-01-16 | End: 2022-01-20 | Stop reason: HOSPADM

## 2022-01-15 RX ORDER — ONDANSETRON 2 MG/ML
4 INJECTION INTRAMUSCULAR; INTRAVENOUS
Status: DISCONTINUED | OUTPATIENT
Start: 2022-01-15 | End: 2022-01-20 | Stop reason: HOSPADM

## 2022-01-15 RX ORDER — SODIUM CHLORIDE 0.9 % (FLUSH) 0.9 %
5-40 SYRINGE (ML) INJECTION AS NEEDED
Status: DISCONTINUED | OUTPATIENT
Start: 2022-01-15 | End: 2022-01-20 | Stop reason: HOSPADM

## 2022-01-15 RX ORDER — ACETAMINOPHEN 325 MG/1
650 TABLET ORAL
Status: DISCONTINUED | OUTPATIENT
Start: 2022-01-15 | End: 2022-01-20 | Stop reason: HOSPADM

## 2022-01-15 RX ORDER — DONEPEZIL HYDROCHLORIDE 5 MG/1
10 TABLET, FILM COATED ORAL
Status: DISCONTINUED | OUTPATIENT
Start: 2022-01-15 | End: 2022-01-20 | Stop reason: HOSPADM

## 2022-01-15 RX ORDER — ATORVASTATIN CALCIUM 40 MG/1
40 TABLET, FILM COATED ORAL
Status: DISCONTINUED | OUTPATIENT
Start: 2022-01-15 | End: 2022-01-20 | Stop reason: HOSPADM

## 2022-01-15 RX ORDER — SODIUM CHLORIDE, SODIUM LACTATE, POTASSIUM CHLORIDE, CALCIUM CHLORIDE 600; 310; 30; 20 MG/100ML; MG/100ML; MG/100ML; MG/100ML
75 INJECTION, SOLUTION INTRAVENOUS CONTINUOUS
Status: DISCONTINUED | OUTPATIENT
Start: 2022-01-15 | End: 2022-01-20 | Stop reason: HOSPADM

## 2022-01-15 RX ORDER — ACETAMINOPHEN 650 MG/1
650 SUPPOSITORY RECTAL
Status: DISCONTINUED | OUTPATIENT
Start: 2022-01-15 | End: 2022-01-20 | Stop reason: HOSPADM

## 2022-01-15 RX ADMIN — ATORVASTATIN CALCIUM 40 MG: 40 TABLET, FILM COATED ORAL at 21:36

## 2022-01-15 RX ADMIN — DONEPEZIL HYDROCHLORIDE 10 MG: 5 TABLET, FILM COATED ORAL at 21:36

## 2022-01-15 RX ADMIN — ALPRAZOLAM 0.25 MG: 0.5 TABLET ORAL at 21:48

## 2022-01-15 RX ADMIN — BUPROPION HYDROCHLORIDE 150 MG: 150 TABLET, EXTENDED RELEASE ORAL at 21:36

## 2022-01-15 RX ADMIN — SODIUM CHLORIDE, PRESERVATIVE FREE 10 ML: 5 INJECTION INTRAVENOUS at 21:37

## 2022-01-15 RX ADMIN — SODIUM CHLORIDE, SODIUM LACTATE, POTASSIUM CHLORIDE, AND CALCIUM CHLORIDE 75 ML/HR: 600; 310; 30; 20 INJECTION, SOLUTION INTRAVENOUS at 21:37

## 2022-01-15 RX ADMIN — CEFEPIME HYDROCHLORIDE 2 G: 2 INJECTION, POWDER, FOR SOLUTION INTRAVENOUS at 18:12

## 2022-01-15 NOTE — ED PROVIDER NOTES
31-year-old female presenting to the emergency department today with her family secondary to being called and told that she had a resistant urinary tract infection and needed IV antibiotics. Patient was seen and evaluated at an urgent care on 1/11/2022 secondary to some increasing altered mental status. The patient does have dementia and is in a facility. The patient was not able to provide me with the majority of the information because of the dementia and I received this information from her family and review of her medical records.   She has had increased urinary frequency and burning with urination           Past Medical History:   Diagnosis Date    Anxiety     Diabetes ([facility] [address]     Type 2- 140-160 avg- oral and insulin- hypo below 60 A1C 6.2 1/9/2019    Endocrine disease     Hypertension     Kidney stones     Menopause     Renal mass        Past Surgical History:   Procedure Laterality Date    HX HYSTERECTOMY      HX OOPHORECTOMY      CA LAP,CHOLECYSTECTOMY      CA REMOVAL OF KIDNEY STONE           Family History:   Problem Relation Age of Onset    Thyroid Disease Mother     Hypertension Mother     Diabetes Brother     Breast Cancer Neg Hx        Social History     Socioeconomic History    Marital status:      Spouse name: Not on file    Number of children: Not on file    Years of education: Not on file    Highest education level: Not on file   Occupational History    Not on file   Tobacco Use    Smoking status: Never Smoker    Smokeless tobacco: Never Used   Substance and Sexual Activity    Alcohol use: No    Drug use: Never    Sexual activity: Not Currently   Other Topics Concern    Not on file   Social History Narrative    Not on file     Social Determinants of Health     Financial Resource Strain:     Difficulty of Paying Living Expenses: Not on file   Food Insecurity:     Worried About [address] in the Last Year: Not on file    [address] in the Last Year: Not on file   Transportation Needs:     Lack of Transportation (Medical): Not on file    Lack of Transportation (Non-Medical): Not on file   Physical Activity:     Days of Exercise per Week: Not on file    Minutes of Exercise per Session: Not on file   Stress:     Feeling of Stress : Not on file   Social Connections:     Frequency of Communication with Friends and Family: Not on file    Frequency of Social Gatherings with Friends and Family: Not on file    Attends Muslim Services: Not on file    Active Member of [address] [place] [address] or Organizations: Not on file    Attends Club or Organization Meetings: Not on file    Marital Status: Not on file   Intimate Partner Violence:     Fear of Current or Ex-Partner: Not on file    Emotionally Abused: Not on file    Physically Abused: Not on file    Sexually Abused: Not on file   Housing Stability:     Unable to Pay for Housing in the Last Year: Not on file    Number of [place] in the Last Year: Not on file    Unstable Housing in the Last Year: Not on file         ALLERGIES: Codeine, Contrast agent [iodine], and Pcn [penicillins]    Review of Systems   Unable to perform ROS: Dementia       Vitals:    01/15/22 1513   BP: (!) 153/65   Pulse: 65   Resp: 16   Temp: 98.1 °F (36.7 °C)   SpO2: 97%   Weight: 57.2 kg (126 lb)   Height: 5' 5\" (1.651 m)            Physical Exam     GENERAL:The patient has Body mass index is 20.97 kg/m². Well-hydrated. VITAL SIGNS: Heart rate, blood pressure, respiratory rate reviewed as recorded in  nurse's notes  EYES: Pupils reactive. Extraocular motion intact. No conjunctival redness or drainage. NECK: Supple, no meningeal signs. Trachea midline. No masses or thyromegaly. LUNGS: Breath sounds clear and equal bilaterally no accessory muscle use. CHEST: No deformity  CARDIOVASCULAR: Regular rate and rhythm  ABDOMEN: Soft without tenderness. No palpable masses or organomegaly. No  peritoneal signs. No rigidity.   EXTREMITIES: No clubbing or cyanosis. No joint swelling. Normal muscle tone. No  restricted range of motion appreciated. NEUROLOGIC: Sensation is grossly intact. Cranial nerve exam reveals face is  symmetrical, tongue is midline speech is clear. SKIN: No rash or petechiae. Good skin turgor palpated. PSYCHIATRIC: Alert and oriented. Appropriate behavior and judgment.       MDM  Number of Diagnoses or Management Options  Diagnosis management comments: Viral infection, gastroenteritis, viral adenitis, pseudomembranous colitis, inflammatory  bowel disease, infectious diarrhea    Abdominal wall pain,     Constipation, fecal impaction, small bowel obstruction, partial small bowel obstruction,  Ileus    UTI, pyelonephritis, renal colic, ureteral stone     Peptic ulcer disease, esophagitis, GERD    Pancreatitis, pancreatic pseudocyst,    hepatic cirrhosis, GI bleed, esophageal varices, poisoning,    gallbladder disease, cholecystitis, diverticulitis, appendicitis, appendicitis with rupture,    ingestion of foreign material         Amount and/or Complexity of Data Reviewed  Clinical lab tests: reviewed and ordered  Tests in the medicine section of CPT®: ordered and reviewed  Review and summarize past medical records: yes      ED Course as of 01/15/22 1807   Sat Idris 15, 2022   1743     Urine culture  Specimen:  Urine (for culture) - Urine specimen obtained by clean catch procedure (specimen)  Component 4 d ago Comments  Isolate 1  [place] Count: >=100,000 cfu/mL    Isolate 1  Klebsiella pneumoniae (organism)    Accession #:[id number]  Specimen: URINE (for culture)  Resulting [facility]     Susceptibility    Organism Antibiotic Method Susceptibility  Klebsiella pneumoniae Amoxicillin/Clavulanic Acid GRAM NEGATIVE ID AND SENSITIVITY >16/8: Resistant  Klebsiella pneumoniae Ampicillin GRAM NEGATIVE ID AND SENSITIVITY >16: Resistant  Klebsiella pneumoniae Ampicillin/Sulbactam GRAM NEGATIVE ID AND SENSITIVITY >16/8: Resistant  Klebsiella pneumoniae Cefazolin GRAM NEGATIVE ID AND SENSITIVITY >16: Resistant  Klebsiella pneumoniae Cefepime GRAM NEGATIVE ID AND SENSITIVITY <=0.5: Susceptible  Klebsiella pneumoniae Ceftriaxone GRAM NEGATIVE ID AND SENSITIVITY 8: Resistant  Klebsiella pneumoniae Ciprofloxacin GRAM NEGATIVE ID AND SENSITIVITY >2: Resistant  Klebsiella pneumoniae Gentamicin GRAM NEGATIVE ID AND SENSITIVITY <=2: Susceptible  Klebsiella pneumoniae Nitrofurantoin GRAM NEGATIVE ID AND SENSITIVITY >64: Resistant  Klebsiella pneumoniae Pip/Tazobactam GRAM NEGATIVE ID AND SENSITIVITY 16/4: Susceptible  Klebsiella pneumoniae Trimethoprim/Sulfa GRAM NEGATIVE ID AND SENSITIVITY >2/38: Resistant     [KH]   6085 I talked to the patient and her family about the findings in the emergency department and need for admission for IV antibiotics. The patient's family and patient have agreed to this plan.   Hospitalist will be consulted [KH]   2106 Dr. Miroslava Reid was consulted through [facility]      ED Course User Index  [KH] Juanis Serra DO       Procedures

## 2022-01-15 NOTE — ED NOTES
79 yo female presents today for UTI. She was diagnosed on 1/[facility] at [facility]. She was started on macrobid, called today with urine culture results and told to be seen here for IV antibiotics. I have reviewed the urine culture results which showed Klebsiella Pneumonia with susceptibility to Cefepime or Zosyn. No fever, no vomiting, suprapubic abdominal pain. VSS. NAD. Patient has dementia at baseline and per daughter the confusion is slightly worse since she was diagnosed with the UTI. Patient evaluated initially in triage. Rapid Medical Evaluation was conducted and necessary orders have been placed. I have performed a medical screening exam.  Care will now be transferred to the provider in the back of the emergency department.   Cole Nielsenma 3:17 PM

## 2022-01-15 NOTE — ED TRIAGE NOTES
Patient was brought here by her daughter after patient with diagnosed UTI and was called today after culture came back positive for klebsiella pneumoniae and needed to come to ED for IV antibiotics. Masked. Alert to normal self per daughter.

## 2022-01-16 PROBLEM — E87.1 HYPONATREMIA: Status: RESOLVED | Noted: 2019-01-08 | Resolved: 2022-01-16

## 2022-01-16 PROBLEM — E11.65 TYPE 2 DIABETES MELLITUS WITH HYPERGLYCEMIA, WITH LONG-TERM CURRENT USE OF INSULIN (HCC): Chronic | Status: ACTIVE | Noted: 2019-01-08

## 2022-01-16 PROBLEM — B96.89 URINARY TRACT INFECTION DUE TO EXTENDED-SPECTRUM BETA LACTAMASE (ESBL)-PRODUCING KLEBSIELLA: Status: ACTIVE | Noted: 2022-01-15

## 2022-01-16 PROBLEM — Z79.4 TYPE 2 DIABETES MELLITUS WITH HYPERGLYCEMIA, WITH LONG-TERM CURRENT USE OF INSULIN (HCC): Chronic | Status: ACTIVE | Noted: 2019-01-08

## 2022-01-16 PROBLEM — N28.89 RENAL MASS, RIGHT: Chronic | Status: ACTIVE | Noted: 2019-06-27

## 2022-01-16 PROBLEM — F03.90 DEMENTIA WITHOUT BEHAVIORAL DISTURBANCE (HCC): Chronic | Status: ACTIVE | Noted: 2022-01-15

## 2022-01-16 PROBLEM — I10 PRIMARY HYPERTENSION: Chronic | Status: ACTIVE | Noted: 2019-01-08

## 2022-01-16 PROBLEM — N95.2 VAGINAL ATROPHY: Chronic | Status: ACTIVE | Noted: 2019-05-20

## 2022-01-16 PROBLEM — R19.7 DIARRHEA: Status: RESOLVED | Noted: 2019-01-08 | Resolved: 2022-01-16

## 2022-01-16 PROBLEM — Z79.4 TYPE 2 DIABETES MELLITUS WITH HYPERGLYCEMIA, WITH LONG-TERM CURRENT USE OF INSULIN (HCC): Status: ACTIVE | Noted: 2019-01-08

## 2022-01-16 PROBLEM — G93.41 METABOLIC ENCEPHALOPATHY: Status: RESOLVED | Noted: 2019-01-24 | Resolved: 2022-01-16

## 2022-01-16 PROBLEM — E11.65 TYPE 2 DIABETES MELLITUS WITH HYPERGLYCEMIA, WITH LONG-TERM CURRENT USE OF INSULIN (HCC): Status: ACTIVE | Noted: 2019-01-08

## 2022-01-16 PROBLEM — N39.0 RECURRENT UTI: Chronic | Status: ACTIVE | Noted: 2020-02-20

## 2022-01-16 PROBLEM — G93.40 ENCEPHALOPATHY: Status: RESOLVED | Noted: 2019-01-23 | Resolved: 2022-01-16

## 2022-01-16 PROBLEM — E11.9 DM (DIABETES MELLITUS) (HCC): Chronic | Status: ACTIVE | Noted: 2019-01-08

## 2022-01-16 LAB
ANION GAP SERPL CALC-SCNC: 1 MMOL/L (ref 7–16)
BUN SERPL-MCNC: 22 MG/DL (ref 8–23)
CALCIUM SERPL-MCNC: 9.6 MG/DL (ref 8.3–10.4)
CHLORIDE SERPL-SCNC: 110 MMOL/L (ref 98–107)
CO2 SERPL-SCNC: 27 MMOL/L (ref 21–32)
CREAT SERPL-MCNC: 0.98 MG/DL (ref 0.6–1)
ERYTHROCYTE [DISTWIDTH] IN BLOOD BY AUTOMATED COUNT: 12.8 % (ref 11.9–14.6)
GLUCOSE BLD STRIP.AUTO-MCNC: 179 MG/DL (ref 65–100)
GLUCOSE BLD STRIP.AUTO-MCNC: 208 MG/DL (ref 65–100)
GLUCOSE SERPL-MCNC: 196 MG/DL (ref 65–100)
HCT VFR BLD AUTO: 35.1 % (ref 35.8–46.3)
HGB BLD-MCNC: 11.5 G/DL (ref 11.7–15.4)
MCH RBC QN AUTO: 30.5 PG (ref 26.1–32.9)
MCHC RBC AUTO-ENTMCNC: 32.8 G/DL (ref 31.4–35)
MCV RBC AUTO: 93.1 FL (ref 79.6–97.8)
NRBC # BLD: 0 K/UL (ref 0–0.2)
PLATELET # BLD AUTO: 209 K/UL (ref 150–450)
PMV BLD AUTO: 11.4 FL (ref 9.4–12.3)
POTASSIUM SERPL-SCNC: 4.2 MMOL/L (ref 3.5–5.1)
RBC # BLD AUTO: 3.77 M/UL (ref 4.05–5.2)
SERVICE CMNT-IMP: ABNORMAL
SERVICE CMNT-IMP: ABNORMAL
SODIUM SERPL-SCNC: 138 MMOL/L (ref 136–145)
WBC # BLD AUTO: 6.1 K/UL (ref 4.3–11.1)

## 2022-01-16 PROCEDURE — 74011250636 HC RX REV CODE- 250/636: Performed by: FAMILY MEDICINE

## 2022-01-16 PROCEDURE — 36415 COLL VENOUS BLD VENIPUNCTURE: CPT

## 2022-01-16 PROCEDURE — 74011636637 HC RX REV CODE- 636/637: Performed by: FAMILY MEDICINE

## 2022-01-16 PROCEDURE — 85027 COMPLETE CBC AUTOMATED: CPT

## 2022-01-16 PROCEDURE — 76450000000

## 2022-01-16 PROCEDURE — 74011250637 HC RX REV CODE- 250/637: Performed by: FAMILY MEDICINE

## 2022-01-16 PROCEDURE — 82962 GLUCOSE BLOOD TEST: CPT

## 2022-01-16 PROCEDURE — 65270000029 HC RM PRIVATE

## 2022-01-16 PROCEDURE — 80048 BASIC METABOLIC PNL TOTAL CA: CPT

## 2022-01-16 PROCEDURE — 74011000250 HC RX REV CODE- 250: Performed by: FAMILY MEDICINE

## 2022-01-16 PROCEDURE — 74011000258 HC RX REV CODE- 258: Performed by: FAMILY MEDICINE

## 2022-01-16 RX ADMIN — ALPRAZOLAM 0.25 MG: 0.5 TABLET ORAL at 20:51

## 2022-01-16 RX ADMIN — BUPROPION HYDROCHLORIDE 150 MG: 150 TABLET, EXTENDED RELEASE ORAL at 09:09

## 2022-01-16 RX ADMIN — POLYETHYLENE GLYCOL 3350 17 G: 17 POWDER, FOR SOLUTION ORAL at 09:23

## 2022-01-16 RX ADMIN — VALSARTAN 320 MG: 320 TABLET, FILM COATED ORAL at 09:09

## 2022-01-16 RX ADMIN — Medication 7 UNITS: at 15:45

## 2022-01-16 RX ADMIN — Medication 3 UNITS: at 15:45

## 2022-01-16 RX ADMIN — ATORVASTATIN CALCIUM 40 MG: 40 TABLET, FILM COATED ORAL at 20:53

## 2022-01-16 RX ADMIN — BUPROPION HYDROCHLORIDE 150 MG: 150 TABLET, EXTENDED RELEASE ORAL at 20:51

## 2022-01-16 RX ADMIN — Medication 3 UNITS: at 09:12

## 2022-01-16 RX ADMIN — ENOXAPARIN SODIUM 40 MG: 100 INJECTION SUBCUTANEOUS at 09:09

## 2022-01-16 RX ADMIN — DONEPEZIL HYDROCHLORIDE 10 MG: 5 TABLET, FILM COATED ORAL at 20:53

## 2022-01-16 RX ADMIN — SODIUM CHLORIDE, SODIUM LACTATE, POTASSIUM CHLORIDE, AND CALCIUM CHLORIDE 75 ML/HR: 600; 310; 30; 20 INJECTION, SOLUTION INTRAVENOUS at 11:27

## 2022-01-16 RX ADMIN — Medication 7 UNITS: at 09:12

## 2022-01-16 RX ADMIN — SPIRONOLACTONE 25 MG: 25 TABLET ORAL at 09:09

## 2022-01-16 RX ADMIN — SODIUM CHLORIDE, PRESERVATIVE FREE 10 ML: 5 INJECTION INTRAVENOUS at 15:49

## 2022-01-16 RX ADMIN — CEFEPIME HYDROCHLORIDE 1 G: 1 INJECTION, POWDER, FOR SOLUTION INTRAMUSCULAR; INTRAVENOUS at 17:09

## 2022-01-16 RX ADMIN — SODIUM CHLORIDE, PRESERVATIVE FREE 10 ML: 5 INJECTION INTRAVENOUS at 05:05

## 2022-01-16 RX ADMIN — SODIUM CHLORIDE, PRESERVATIVE FREE 10 ML: 5 INJECTION INTRAVENOUS at 20:53

## 2022-01-16 NOTE — PROGRESS NOTES
Hospitalist Progress Note   Admit Date:  1/15/2022  4:39 PM   Name:  Loyda Mancini   Age:  80 y.o. Sex:  female  :  1940   MRN:  [id number]   Room:  [facility]/    Presenting Complaint: Urinary Pain    Reason(s) for Admission: UTI (urinary tract infection) [N39.0]     Hospital Course & Interval History:   81F PMHx advanced dementia, HTN who presented 1/15 to ED with her daughter after being told she needed IV antibiotics for a resistant UTI. Patient was seen at an outpatient clinic on 22, diagnosed with a UTI, and started on Macrobid. She was told today that the urine culture was growing a resistant bacteria which necessitates IV antibiotics. Urine culture was growing Klebsiella which is only sensitive to cefepime, zosyn, gentamycin. Hospitalist to admit. Subjective (22): Reports feeling better today. Displaying maybe some mild confusion but able to recall distant events and carry on a normal conversation. She is very pleasant.     Assessment & Plan:   * Urinary tract infection due to extended-spectrum beta lactamase (ESBL)-producing Klebsiella  Klebsiella resistant to PO antibiotics, ER started on cefepime, will continue as per culture results, appears this has the best NURY  - Not currently febrile, continue to monitor  - Monitor CBC as well, normal WBC currently  - cefepime EOT : continue antibiotics    Dementia without behavioral disturbance (HCC)  Oriented to person at baseline  -Donepezil, bupropion, alprazolam    : consult palliative care    ANJANA (acute kidney injury) ([facility] [address]  Resolved     Type 2 diabetes mellitus with hyperglycemia, with long-term current use of insulin ([address]  - Home insulin  - Diabetic diet    Primary hypertension  Admission VSS  -Spironolactone, valsartan        Dispo/Discharge Planning:    Return to memory care    Diet:  ADULT DIET Regular; 4 carb choices (60 gm/meal)  DVT PPx: Enoxaparin  Code status: Full Code    Hospital Problems as of 1/16/2022 Date Reviewed: 1/16/2022          Codes Class Noted - Resolved POA    * (Principal) Urinary tract infection due to extended-spectrum beta lactamase (ESBL)-producing Klebsiella ICD-10-CM: N39.0, B96.89  ICD-9-CM: 599.0, 041.84  1/15/2022 - Present Yes        Dementia without behavioral disturbance (HCC) (Chronic) ICD-10-CM: F03.90  ICD-9-CM: 294.20  1/15/2022 - Present Yes        ANJANA (acute kidney injury) ([facility] [address] ICD-10-CM: N17.9  ICD-9-CM: 584.9  1/15/2022 - Present Yes        Primary hypertension (Chronic) ICD-10-CM: I10  ICD-9-CM: 401.9  1/8/2019 - Present Yes        Type 2 diabetes mellitus with hyperglycemia, with long-term current use of insulin (HCC) (Chronic) ICD-10-CM: E11.65, Z79.4  ICD-9-CM: 250.00, 790.29, V58.67  1/8/2019 - Present Yes              Objective:     Patient Vitals for the past 24 hrs:   Temp Pulse Resp BP SpO2   01/16/22 0757 98 °F (36.7 °C) (!) 57 18 (!) 122/58 99 %   01/16/22 0236 97.9 °F (36.6 °C) 85 18 109/84 99 %   01/15/22 2324 97.7 °F (36.5 °C) 76 18 (!) 152/94 100 %   01/15/22 2052 97.5 °F (36.4 °C) 74 18 (!) 155/96 100 %   01/15/22 2034 98.1 °F (36.7 °C) 64 20 (!) 143/74 98 %   01/15/22 1917 98.4 °F (36.9 °C) 68 18 (!) 144/68 98 %   01/15/22 1513 98.1 °F (36.7 °C) 65 16 (!) 153/65 97 %     Oxygen Therapy  O2 Sat (%): 99 % (01/16/22 0757)  O2 Device: None (Room air) (01/16/22 0236)    Estimated body mass index is 21.4 kg/m² as calculated from the following:    Height as of this encounter: 5' 5\" (1.651 m). Weight as of this encounter: 58.3 kg (128 lb 9.6 oz). Intake/Output Summary (Last 24 hours) at 1/16/2022 1048  Last data filed at 1/16/2022 0517  Gross per 24 hour   Intake 783 ml   Output 500 ml   Net 283 ml       Blood pressure (!) 122/58, pulse (!) 57, temperature 98 °F (36.7 °C), resp. rate 18, height 5' 5\" (1.651 m), weight 58.3 kg (128 lb 9.6 oz), SpO2 99 %. Physical Exam  Vitals and nursing note reviewed.    Constitutional:       General: She is not in acute distress. Appearance: Normal appearance. Comments: Affable   HENT:      Head: Normocephalic. Eyes:      Extraocular Movements: Extraocular movements intact. Cardiovascular:      Rate and Rhythm: Normal rate. Pulses:           Radial pulses are 2+ on the left side. Pulmonary:      Effort: Pulmonary effort is normal. No respiratory distress. Musculoskeletal:         General: No deformity. Cervical back: No rigidity. Skin:     General: Skin is warm and dry. Neurological:      General: No focal deficit present. Mental Status: She is alert. She is disoriented. Psychiatric:         Mood and Affect: Mood normal.         Behavior: Behavior normal.         Cognition and Memory: She exhibits impaired recent memory. She does not exhibit impaired remote memory. I have reviewed ordered lab tests and independently visualized imaging below:    Recent Labs:  Recent Results (from the past 48 hour(s))   CBC WITH AUTOMATED DIFF    Collection Time: 01/15/22  3:32 PM   Result Value Ref Range    WBC 8.7 4.3 - 11.1 K/uL    RBC 4.41 4.05 - 5.2 M/uL    HGB 13.6 11.7 - 15.4 g/dL    HCT 40.8 35.8 - 46.3 %    MCV 92.5 79.6 - 97.8 FL    MCH 30.8 26.1 - 32.9 PG    MCHC 33.3 31.4 - 35.0 g/dL    RDW 12.7 11.9 - 14.6 %    PLATELET 614 959 - 580 K/uL    MPV 10.9 9.4 - 12.3 FL    ABSOLUTE NRBC 0.00 0.0 - 0.2 K/uL    NEUTROPHILS 67 43 - 78 %    LYMPHOCYTES 23 13 - 44 %    MONOCYTES 6 4.0 - 12.0 %    EOSINOPHILS 2 0.5 - 7.8 %    BASOPHILS 1 0.0 - 2.0 %    IMMATURE GRANULOCYTES 1 0.0 - 5.0 %    ABS. NEUTROPHILS 5.9 1.7 - 8.2 K/UL    ABS. LYMPHOCYTES 2.0 0.5 - 4.6 K/UL    ABS. MONOCYTES 0.5 0.1 - 1.3 K/UL    ABS. EOSINOPHILS 0.1 0.0 - 0.8 K/UL    ABS. BASOPHILS 0.1 0.0 - 0.2 K/UL    ABS. IMM.  GRANS. 0.0 0.0 - 0.5 K/UL    DF AUTOMATED     METABOLIC PANEL, COMPREHENSIVE    Collection Time: 01/15/22  3:32 PM   Result Value Ref Range    Sodium 136 136 - 145 mmol/L    Potassium 4.6 3.5 - 5.1 mmol/L    Chloride 107 98 - 107 mmol/L    CO2 25 21 - 32 mmol/L    Anion gap 4 (L) 7 - 16 mmol/L    Glucose 246 (H) 65 - 100 mg/dL    BUN 28 (H) 8 - 23 MG/DL    Creatinine 1.30 (H) 0.6 - 1.0 MG/DL    GFR est AA 51 (L) >60 ml/min/1.73m2    GFR est non-AA 42 (L) >60 ml/min/1.73m2    Calcium 10.0 8.3 - 10.4 MG/DL    Bilirubin, total 0.6 0.2 - 1.1 MG/DL    ALT (SGPT) 38 12 - 65 U/L    AST (SGOT) 26 15 - 37 U/L    Alk.  phosphatase 93 50 - 130 U/L    Protein, total 7.6 6.3 - 8.2 g/dL    Albumin 4.0 3.2 - 4.6 g/dL    Globulin 3.6 (H) 2.3 - 3.5 g/dL    A-G Ratio 1.1 (L) 1.2 - 3.5     PROCALCITONIN    Collection Time: 01/15/22  3:32 PM   Result Value Ref Range    Procalcitonin <0.05 0.00 - 0.49 ng/mL   CULTURE, BLOOD    Collection Time: 01/15/22  5:41 PM    Specimen: Blood   Result Value Ref Range    Special Requests: LEFT  Antecubital        Culture result: NO GROWTH AFTER 13 HOURS     CULTURE, BLOOD    Collection Time: 01/15/22  5:41 PM    Specimen: Blood   Result Value Ref Range    Special Requests: RIGHT  FOREARM        Culture result: NO GROWTH AFTER 13 HOURS     URINE MICROSCOPIC    Collection Time: 01/15/22  5:41 PM   Result Value Ref Range    WBC  0 /hpf    RBC 0-3 0 /hpf    Epithelial cells 0-3 0 /hpf    Bacteria 2+ (H) 0 /hpf    Casts HYALINE 0 /lpf    Crystals, urine 0 0 /LPF    Mucus 0 0 /lpf    Other observations RESULTS VERIFIED MANUALLY     COVID-19 RAPID TEST    Collection Time: 01/15/22  7:13 PM   Result Value Ref Range    Specimen source NASAL SWAB      COVID-19 rapid test Not detected NOTD     METABOLIC PANEL, BASIC    Collection Time: 01/16/22  5:23 AM   Result Value Ref Range    Sodium 138 136 - 145 mmol/L    Potassium 4.2 3.5 - 5.1 mmol/L    Chloride 110 (H) 98 - 107 mmol/L    CO2 27 21 - 32 mmol/L    Anion gap 1 (L) 7 - 16 mmol/L    Glucose 196 (H) 65 - 100 mg/dL    BUN 22 8 - 23 MG/DL    Creatinine 0.98 0.6 - 1.0 MG/DL    GFR est AA >60 >60 ml/min/1.73m2    GFR est non-AA 58 (L) >60 ml/min/1.73m2    Calcium 9.6 8.3 - 10.4 MG/DL   CBC W/O DIFF    Collection Time: 01/16/22  5:23 AM   Result Value Ref Range    WBC 6.1 4.3 - 11.1 K/uL    RBC 3.77 (L) 4.05 - 5.2 M/uL    HGB 11.5 (L) 11.7 - 15.4 g/dL    HCT 35.1 (L) 35.8 - 46.3 %    MCV 93.1 79.6 - 97.8 FL    MCH 30.5 26.1 - 32.9 PG    MCHC 32.8 31.4 - 35.0 g/dL    RDW 12.8 11.9 - 14.6 %    PLATELET 965 030 - 647 K/uL    MPV 11.4 9.4 - 12.3 FL    ABSOLUTE NRBC 0.00 0.0 - 0.2 K/uL   GLUCOSE, POC    Collection Time: 01/16/22  8:05 AM   Result Value Ref Range    Glucose (POC) 179 (H) 65 - 100 mg/dL    Performed by ReynaNicole        All Micro Results     Procedure Component Value Units Date/Time    CULTURE, BLOOD [[id number]] Collected: 01/15/22 1741    Order Status: Completed Specimen: Blood Updated: 01/16/22 0738     Special Requests: --        RIGHT  FOREARM       Culture result: NO GROWTH AFTER 13 HOURS       CULTURE, BLOOD [[id number]] Collected: 01/15/22 1741    Order Status: Completed Specimen: Blood Updated: 01/16/22 0738     Special Requests: --        LEFT  Antecubital       Culture result: NO GROWTH AFTER 13 HOURS       COVID-19 RAPID TEST [[id number]] Collected: 01/15/22 1913    Order Status: Completed Specimen: Nasopharyngeal Updated: 01/15/22 1945     Specimen source NASAL SWAB        COVID-19 rapid test Not detected        Comment:      The specimen is NEGATIVE for SARS-CoV-2, the novel coronavirus associated with COVID-19. A negative result does not rule out COVID-19. This test has been authorized by the FDA under an Emergency Use Authorization (EUA) for use by authorized laboratories. Fact sheet for Healthcare Providers: ConventionUpdate.co.nz  Fact sheet for Patients: ConventionUpdate.co.nz       Methodology: Isothermal Nucleic Acid Amplification               Other Studies:  No results found.     Current Meds:  Current Facility-Administered Medications   Medication Dose Route Frequency    buPROPion SR (WELLBUTRIN SR) tablet 150 mg  150 mg Oral BID    atorvastatin (LIPITOR) tablet 40 mg  40 mg Oral QHS    valsartan (DIOVAN) tablet 320 mg  320 mg Oral DAILY    spironolactone (ALDACTONE) tablet 25 mg  25 mg Oral DAILY    ALPRAZolam (XANAX) tablet 0.25 mg  0.25 mg Oral QHS    donepeziL (ARICEPT) tablet 10 mg  10 mg Oral QHS    sodium chloride (NS) flush 5-40 mL  5-40 mL IntraVENous Q8H    sodium chloride (NS) flush 5-40 mL  5-40 mL IntraVENous PRN    acetaminophen (TYLENOL) tablet 650 mg  650 mg Oral Q6H PRN    Or    acetaminophen (TYLENOL) suppository 650 mg  650 mg Rectal Q6H PRN    polyethylene glycol (MIRALAX) packet 17 g  17 g Oral DAILY PRN    ondansetron (ZOFRAN ODT) tablet 4 mg  4 mg Oral Q8H PRN    Or    ondansetron (ZOFRAN) injection 4 mg  4 mg IntraVENous Q6H PRN    enoxaparin (LOVENOX) injection 40 mg  40 mg SubCUTAneous DAILY    lactated Ringers infusion  75 mL/hr IntraVENous CONTINUOUS    cefepime (MAXIPIME) 1 g in 0.9% sodium chloride (MBP/ADV) 50 mL MBP  1 g IntraVENous Q24H    insulin NPH (NOVOLIN N, HUMULIN N) injection 7 Units  7 Units SubCUTAneous ACB&D    And    insulin regular (NOVOLIN R, HUMULIN R) injection 3 Units  3 Units SubCUTAneous ACB&D       Signed:  Betsy Nickerson MD

## 2022-01-16 NOTE — PROGRESS NOTES
TRANSFER - IN REPORT:    Verbal report received from Mary Montes De Oca on Hellen Holloway  being received from ED for admission    Report consisted of patients Situation, Background, Assessment and   Recommendations(SBAR). Information from the following report(s) SBAR was reviewed with the receiving nurse. Opportunity for questions and clarification was provided. Assessment completed upon patients arrival to unit and care assumed.

## 2022-01-16 NOTE — ED NOTES
TRANSFER - OUT REPORT:    Verbal report given to Dexter Hammonds on Babar Taylor  being transferred to ER for routine progression of care       Report consisted of patients Situation, Background, Assessment and   Recommendations(SBAR). Information from the following report(s) ED Summary was reviewed with the receiving nurse. Lines:   Peripheral IV 01/15/22 Right Antecubital (Active)   Site Assessment Clean, dry, & intact 01/15/22 1535   Phlebitis Assessment 0 01/15/22 1535   Infiltration Assessment 0 01/15/22 1535   Dressing Status Clean, dry, & intact 01/15/22 1535   Dressing Type Tape;Transparent 01/15/22 1535   Hub Color/Line Status Pink;Flushed;Patent 01/15/22 1535   Action Taken Blood drawn 01/15/22 1535        Opportunity for questions and clarification was provided.       Patient transported with:   Nurse

## 2022-01-16 NOTE — PROGRESS NOTES
01/15/22 2200   Dual Skin Pressure Injury Assessment   Dual Skin Pressure Injury Assessment WDL   Second Care Provider (Based on [address] [place] [address]) Mesfin Potts   Skin Integumentary   Skin Integumentary (WDL) X    Pressure  Injury Documentation No Pressure Injury Noted-Pressure Ulcer Prevention Initiated   Skin Color Appropriate for ethnicity; Ecchymosis (comment)  (scattered bruises)   Skin Condition/Temp Fragile;Dry   Skin Integrity Excoriation  (inner buttocks;dry;no open areas)   Turgor Epidermis thin w/ loss of subcut tissue

## 2022-01-16 NOTE — H&P
Hospitalist History and Physical   Admit Date:  1/15/2022  4:39 PM   Name:  Percy Jane   Age:  80 y.o. Sex:  female  :  1940   MRN:  [id number]     Presenting Complaint: resistant UTI  Reason(s) for Admission: UTI (urinary tract infection) [N39.0]     History of Present Illness:   Percy Jane is a 80 y.o. female with medical history of dementia, HTN who presented to ED with her daughter after being told she needed IV antibiotics for a resistant UTI. Patient was seen at an outpatient clinic on 22, diagnosed with a UTI, and started on Macrobid. She was told today that the urine culture is growing a resistant bacteria which necessitates IV antibiotics. Urine culture is growing Klebsiella which is only sensitive to cefepime, zosyn, gentamycin. Hospitalist to admit. Review of Systems:  10 systems reviewed and negative except as noted in HPI. Assessment & Plan:   * UTI (urinary tract infection)  - Klebsiella resistant to PO antibiotics  - ER started on cefepime, will continue as per culture results, appears this has the best NURY  - Not currently febrile, continue to monitor  - Monitor CBC as well, normal WBC currently    ANJANA (acute kidney injury) ([facility]  - Mild  - Start LR  - Recheck labs in AM    Dementia ([facility]  - Oriented to person at baseline  - At baseline currently per daughter  - Continue home meds    DM (diabetes mellitus) ([facility]  - Home insulin  - Diabetic diet    HTN (hypertension)  - Home meds  - Currently stable       Disposition: inpatient    Diet: Diabetic  VTE ppx: Lovenox  Code status: FULL      Past medical history reviewed. Past Medical History:   Diagnosis Date    Anxiety     Diabetes ([address]     Type 2- 140-160 avg- oral and insulin- hypo below 60 A1C 6.2 2019    Endocrine disease     Hypertension     Kidney stones     Menopause     Renal mass      Past surgical history reviewed.     Past Surgical History:   Procedure Laterality Date    HX HYSTERECTOMY  HX OOPHORECTOMY      NH LAP,CHOLECYSTECTOMY      NH REMOVAL OF KIDNEY STONE        Allergies   Allergen Reactions    Codeine Other (comments)    Contrast Agent [Iodine] Hives     Needs allergy protocol prior to contrast    Pcn [Penicillins] Unknown (comments)      Social History     Tobacco Use    Smoking status: Never Smoker    Smokeless tobacco: Never Used   Substance Use Topics    Alcohol use: No      Family History   Problem Relation Age of Onset    Thyroid Disease Mother     Hypertension Mother     Diabetes Brother     Breast Cancer Neg Hx       Family history reviewed and noncontributory to patient's acute condition; no relevant family history unless otherwise noted above. Immunization History   Administered Date(s) Administered    TB Skin Test (PPD) Intradermal 01/24/2019, 06/30/2019     PTA Medications:  Current Outpatient Medications   Medication Instructions    ALPRAZolam (XANAX) 0.25 mg, Oral, EVERY BEDTIME    atorvastatin (LIPITOR) 40 mg, Oral, EVERY BEDTIME    buPROPion XL (WELLBUTRIN XL) 300 mg, Oral, 7AM, Take morning of procedure.  diphenhydrAMINE (BenadryL) 25 mg capsule Take both tabs 1 hour prior to CT scan    donepeziL (ARICEPT) 10 mg, Oral, EVERY BEDTIME    insulin NPH/insulin regular (HUMULIN 70/30 U-100 INSULIN) 100 unit/mL (70-30) injection 10 Units, SubCUTAneous, 2 TIMES DAILY, Morning before procedure take 80%- adjusted dose = 8 unitsEvening before procedure take 80%- adjusted dose= 8 unitsMorning of procedure take 50% if glucose level is >120 = 5 units  If glucose <120, then hold    spironolactone (ALDACTONE) 25 mg, Oral, DAILY    valsartan (DIOVAN) 320 mg tablet Oral, DAILY, Do not take morning of procedure.        Objective:     Patient Vitals for the past 24 hrs:   Temp Pulse Resp BP SpO2   01/15/22 1917 98.4 °F (36.9 °C) 68 18 (!) 144/68 98 %   01/15/22 1513 98.1 °F (36.7 °C) 65 16 (!) 153/65 97 %     Oxygen Therapy  O2 Sat (%): 98 % (01/15/22 1917)  O2 Device: None (Room air) (01/15/22 4147)    Estimated body mass index is 20.97 kg/m² as calculated from the following:    Height as of this encounter: 5' 5\" (1.651 m). Weight as of this encounter: 57.2 kg (126 lb). No intake or output data in the 24 hours ending 01/15/22 2011      Physical Exam:  General:    Well nourished. No overt distress  Head:  Normocephalic, atraumatic  Eyes:  Sclerae appear normal.  Pupils equally round. HENT:  Nares appear normal, no drainage. Moist mucous membranes  Neck:  No restricted ROM. Trachea midline  CV:   RRR. S1/S2 auscultated  Lungs:   CTAB. No wheezing, rhonchi, or rales. Appears even, unlabored  Abdomen: Bowel sounds present. Soft, nontender, nondistended. Extremities: Warm and dry. No cyanosis or clubbing. No edema. Skin:     No rashes. Normal turgor. Normal coloration  Neuro:  Cranial nerves II-XII grossly intact. Sensation intact  Psych:  Normal mood and affect. Pleasant. Oriented to person    Data Ordered and Personally Reviewed:    Last 24hr Labs:  Recent Results (from the past 24 hour(s))   CBC WITH AUTOMATED DIFF    Collection Time: 01/15/22  3:32 PM   Result Value Ref Range    WBC 8.7 4.3 - 11.1 K/uL    RBC 4.41 4.05 - 5.2 M/uL    HGB 13.6 11.7 - 15.4 g/dL    HCT 40.8 35.8 - 46.3 %    MCV 92.5 79.6 - 97.8 FL    MCH 30.8 26.1 - 32.9 PG    MCHC 33.3 31.4 - 35.0 g/dL    RDW 12.7 11.9 - 14.6 %    PLATELET 040 231 - 996 K/uL    MPV 10.9 9.4 - 12.3 FL    ABSOLUTE NRBC 0.00 0.0 - 0.2 K/uL    NEUTROPHILS 67 43 - 78 %    LYMPHOCYTES 23 13 - 44 %    MONOCYTES 6 4.0 - 12.0 %    EOSINOPHILS 2 0.5 - 7.8 %    BASOPHILS 1 0.0 - 2.0 %    IMMATURE GRANULOCYTES 1 0.0 - 5.0 %    ABS. NEUTROPHILS 5.9 1.7 - 8.2 K/UL    ABS. LYMPHOCYTES 2.0 0.5 - 4.6 K/UL    ABS. MONOCYTES 0.5 0.1 - 1.3 K/UL    ABS. EOSINOPHILS 0.1 0.0 - 0.8 K/UL    ABS. BASOPHILS 0.1 0.0 - 0.2 K/UL    ABS. IMM.  GRANS. 0.0 0.0 - 0.5 K/UL    DF AUTOMATED     METABOLIC PANEL, COMPREHENSIVE Collection Time: 01/15/22  3:32 PM   Result Value Ref Range    Sodium 136 136 - 145 mmol/L    Potassium 4.6 3.5 - 5.1 mmol/L    Chloride 107 98 - 107 mmol/L    CO2 25 21 - 32 mmol/L    Anion gap 4 (L) 7 - 16 mmol/L    Glucose 246 (H) 65 - 100 mg/dL    BUN 28 (H) 8 - 23 MG/DL    Creatinine 1.30 (H) 0.6 - 1.0 MG/DL    GFR est AA 51 (L) >60 ml/min/1.73m2    GFR est non-AA 42 (L) >60 ml/min/1.73m2    Calcium 10.0 8.3 - 10.4 MG/DL    Bilirubin, total 0.6 0.2 - 1.1 MG/DL    ALT (SGPT) 38 12 - 65 U/L    AST (SGOT) 26 15 - 37 U/L    Alk. phosphatase 93 50 - 130 U/L    Protein, total 7.6 6.3 - 8.2 g/dL    Albumin 4.0 3.2 - 4.6 g/dL    Globulin 3.6 (H) 2.3 - 3.5 g/dL    A-G Ratio 1.1 (L) 1.2 - 3.5     PROCALCITONIN    Collection Time: 01/15/22  3:32 PM   Result Value Ref Range    Procalcitonin <0.05 0.00 - 0.49 ng/mL   URINE MICROSCOPIC    Collection Time: 01/15/22  5:41 PM   Result Value Ref Range    WBC  0 /hpf    RBC 0-3 0 /hpf    Epithelial cells 0-3 0 /hpf    Bacteria 2+ (H) 0 /hpf    Casts HYALINE 0 /lpf    Crystals, urine 0 0 /LPF    Mucus 0 0 /lpf    Other observations RESULTS VERIFIED MANUALLY     COVID-19 RAPID TEST    Collection Time: 01/15/22  7:13 PM   Result Value Ref Range    Specimen source NASAL SWAB      COVID-19 rapid test Not detected NOTD         All Micro Results     Procedure Component Value Units Date/Time    COVID-19 RAPID TEST [[id number]] Collected: 01/15/22 1913    Order Status: Completed Specimen: Nasopharyngeal Updated: 01/15/22 1945     Specimen source NASAL SWAB        COVID-19 rapid test Not detected        Comment:      The specimen is NEGATIVE for SARS-CoV-2, the novel coronavirus associated with COVID-19. A negative result does not rule out COVID-19. This test has been authorized by the FDA under an Emergency Use Authorization (EUA) for use by authorized laboratories.         Fact sheet for Healthcare Providers: ConventionUpdate.co.nz  Fact sheet for Patients: [facility] Psychiatric CenterUpdate.co.nz       Methodology: Isothermal Nucleic Acid Amplification         CULTURE, BLOOD [[id number]] Collected: 01/15/22 1741    Order Status: Completed Specimen: Blood Updated: 01/15/22 1759    CULTURE, BLOOD [[id number]] Collected: 01/15/22 1741    Order Status: Completed Specimen: Blood Updated: 01/15/22 1758          Other Studies:  No results found.       Medications Administered     cefepime (MAXIPIME) 2 g in 0.9% sodium chloride (MBP/ADV) 100 mL MBP     Admin Date  01/15/2022 Action  New Bag Dose  2 g Rate  200 mL/hr Route  IntraVENous Administered By  Eleazar Lentz RN                  Signed:  Alberto Grant MD

## 2022-01-16 NOTE — PROGRESS NOTES
END OF SHIFT NOTE:    Intake/Output  01/15 1901 - 01/16 0700  In: 598 [P.O.:240; I.V.:358]  Out: 500 [Urine:500]   Voiding: YES  Catheter: NO  Drain:              Stool:  0 occurrences. Emesis:  0 occurrences. VITAL SIGNS  Patient Vitals for the past 12 hrs:   Temp Pulse Resp BP SpO2   01/16/22 0236 97.9 °F (36.6 °C) 85 18 109/84 99 %   01/15/22 2324 97.7 °F (36.5 °C) 76 18 (!) 152/94 100 %   01/15/22 2052 97.5 °F (36.4 °C) 74 18 (!) 155/96 100 %   01/15/22 2034 98.1 °F (36.7 °C) 64 20 (!) 143/74 98 %   01/15/22 1917 98.4 °F (36.9 °C) 68 18 (!) 144/68 98 %       Pain Assessment  Pain 1  Pain Scale 1: Numeric (0 - 10) (01/16/22 0205)  Pain Intensity 1: 0 (01/16/22 0205)  Patient Stated Pain Goal: 0 (01/16/22 0205)  Pain Location 1: Pelvis (01/15/22 1513)    Ambulating  Yes    Additional Information:   Admitted fr ED w/ UTI. Pleasantly confused;falls precautions observed. IV hydration continues. Shift report given to oncoming nurse at the bedside.     Peterson Bruno RN

## 2022-01-16 NOTE — ASSESSMENT & PLAN NOTE
Klebsiella resistant to PO antibiotics, ER started on cefepime, will continue as per culture results, appears this has the best NURY  - Not currently febrile, continue to monitor  - Monitor CBC as well, normal WBC currently  - cefepime EOT 1/22 1/18: VSS, BCx NGTD, continue cefepime

## 2022-01-16 NOTE — ASSESSMENT & PLAN NOTE
Oriented to person at baseline  -Donepezil, bupropion, alprazolam    1/18: Confabulates and appears appropriate but has clear memory deficits

## 2022-01-17 ENCOUNTER — APPOINTMENT (OUTPATIENT)
Dept: CT IMAGING | Age: 82
DRG: 689 | End: 2022-01-17
Attending: FAMILY MEDICINE
Payer: MEDICARE

## 2022-01-17 PROBLEM — Z98.890 HISTORY OF NEPHROLITHOTOMY WITH REMOVAL OF CALCULI: Status: ACTIVE | Noted: 2022-01-17

## 2022-01-17 PROBLEM — Z87.442 HISTORY OF NEPHROLITHOTOMY WITH REMOVAL OF CALCULI: Status: ACTIVE | Noted: 2022-01-17

## 2022-01-17 LAB
ANION GAP SERPL CALC-SCNC: 7 MMOL/L (ref 7–16)
BUN SERPL-MCNC: 17 MG/DL (ref 8–23)
CALCIUM SERPL-MCNC: 9.8 MG/DL (ref 8.3–10.4)
CHLORIDE SERPL-SCNC: 107 MMOL/L (ref 98–107)
CO2 SERPL-SCNC: 24 MMOL/L (ref 21–32)
CREAT SERPL-MCNC: 0.96 MG/DL (ref 0.6–1)
ERYTHROCYTE [DISTWIDTH] IN BLOOD BY AUTOMATED COUNT: 12.7 % (ref 11.9–14.6)
GLUCOSE BLD STRIP.AUTO-MCNC: 197 MG/DL (ref 65–100)
GLUCOSE SERPL-MCNC: 203 MG/DL (ref 65–100)
HCT VFR BLD AUTO: 35.9 % (ref 35.8–46.3)
HGB BLD-MCNC: 11.9 G/DL (ref 11.7–15.4)
MCH RBC QN AUTO: 30.3 PG (ref 26.1–32.9)
MCHC RBC AUTO-ENTMCNC: 33.1 G/DL (ref 31.4–35)
MCV RBC AUTO: 91.3 FL (ref 79.6–97.8)
NRBC # BLD: 0 K/UL (ref 0–0.2)
PLATELET # BLD AUTO: 209 K/UL (ref 150–450)
PMV BLD AUTO: 11.2 FL (ref 9.4–12.3)
POTASSIUM SERPL-SCNC: 4.2 MMOL/L (ref 3.5–5.1)
RBC # BLD AUTO: 3.93 M/UL (ref 4.05–5.2)
SERVICE CMNT-IMP: ABNORMAL
SODIUM SERPL-SCNC: 138 MMOL/L (ref 136–145)
WBC # BLD AUTO: 5.4 K/UL (ref 4.3–11.1)

## 2022-01-17 PROCEDURE — 74011000250 HC RX REV CODE- 250: Performed by: FAMILY MEDICINE

## 2022-01-17 PROCEDURE — 97530 THERAPEUTIC ACTIVITIES: CPT

## 2022-01-17 PROCEDURE — 82962 GLUCOSE BLOOD TEST: CPT

## 2022-01-17 PROCEDURE — 97161 PT EVAL LOW COMPLEX 20 MIN: CPT

## 2022-01-17 PROCEDURE — 65270000029 HC RM PRIVATE

## 2022-01-17 PROCEDURE — 97165 OT EVAL LOW COMPLEX 30 MIN: CPT

## 2022-01-17 PROCEDURE — 74011000258 HC RX REV CODE- 258: Performed by: FAMILY MEDICINE

## 2022-01-17 PROCEDURE — 85027 COMPLETE CBC AUTOMATED: CPT

## 2022-01-17 PROCEDURE — 74176 CT ABD & PELVIS W/O CONTRAST: CPT

## 2022-01-17 PROCEDURE — 74011636637 HC RX REV CODE- 636/637: Performed by: FAMILY MEDICINE

## 2022-01-17 PROCEDURE — 80048 BASIC METABOLIC PNL TOTAL CA: CPT

## 2022-01-17 PROCEDURE — 36415 COLL VENOUS BLD VENIPUNCTURE: CPT

## 2022-01-17 PROCEDURE — 97535 SELF CARE MNGMENT TRAINING: CPT

## 2022-01-17 PROCEDURE — 74011250636 HC RX REV CODE- 250/636: Performed by: FAMILY MEDICINE

## 2022-01-17 PROCEDURE — 74011250637 HC RX REV CODE- 250/637: Performed by: FAMILY MEDICINE

## 2022-01-17 RX ORDER — ALPRAZOLAM 0.5 MG/1
0.25 TABLET ORAL
Status: DISCONTINUED | OUTPATIENT
Start: 2022-01-17 | End: 2022-01-20 | Stop reason: HOSPADM

## 2022-01-17 RX ADMIN — CEFEPIME HYDROCHLORIDE 1 G: 1 INJECTION, POWDER, FOR SOLUTION INTRAMUSCULAR; INTRAVENOUS at 17:00

## 2022-01-17 RX ADMIN — Medication 3 UNITS: at 16:57

## 2022-01-17 RX ADMIN — BUPROPION HYDROCHLORIDE 150 MG: 150 TABLET, EXTENDED RELEASE ORAL at 21:11

## 2022-01-17 RX ADMIN — Medication 7 UNITS: at 08:59

## 2022-01-17 RX ADMIN — SODIUM CHLORIDE, PRESERVATIVE FREE 10 ML: 5 INJECTION INTRAVENOUS at 21:12

## 2022-01-17 RX ADMIN — SODIUM CHLORIDE, SODIUM LACTATE, POTASSIUM CHLORIDE, AND CALCIUM CHLORIDE 75 ML/HR: 600; 310; 30; 20 INJECTION, SOLUTION INTRAVENOUS at 06:46

## 2022-01-17 RX ADMIN — SODIUM CHLORIDE, PRESERVATIVE FREE 10 ML: 5 INJECTION INTRAVENOUS at 13:08

## 2022-01-17 RX ADMIN — ENOXAPARIN SODIUM 40 MG: 100 INJECTION SUBCUTANEOUS at 08:59

## 2022-01-17 RX ADMIN — Medication 3 UNITS: at 08:59

## 2022-01-17 RX ADMIN — Medication 7 UNITS: at 16:57

## 2022-01-17 RX ADMIN — ALPRAZOLAM 0.25 MG: 0.5 TABLET ORAL at 21:11

## 2022-01-17 RX ADMIN — ATORVASTATIN CALCIUM 40 MG: 40 TABLET, FILM COATED ORAL at 21:12

## 2022-01-17 RX ADMIN — BUPROPION HYDROCHLORIDE 150 MG: 150 TABLET, EXTENDED RELEASE ORAL at 08:59

## 2022-01-17 RX ADMIN — SPIRONOLACTONE 25 MG: 25 TABLET ORAL at 08:59

## 2022-01-17 RX ADMIN — VALSARTAN 320 MG: 320 TABLET, FILM COATED ORAL at 08:59

## 2022-01-17 RX ADMIN — SODIUM CHLORIDE, PRESERVATIVE FREE 10 ML: 5 INJECTION INTRAVENOUS at 06:18

## 2022-01-17 RX ADMIN — DONEPEZIL HYDROCHLORIDE 10 MG: 5 TABLET, FILM COATED ORAL at 21:11

## 2022-01-17 NOTE — PROGRESS NOTES
Care Management Interventions  PCP Verified by CM: Yes  Palliative Care Criteria Met (RRAT>21 & CHF Dx)?: No  Transition of Care Consult (CM Consult): Assisted Living ([facility] on [address])  Discharge Durable Medical Equipment: No  Physical Therapy Consult: Yes  Occupational Therapy Consult: Yes  Speech Therapy Consult: No  Support Systems: Assisted Living (Patient lives at the [facility] on [place] [address] [phone])  The Plan for Transition of Care is Related to the Following Treatment Goals : Plans to return to [facility] on [address] [place] [address]  Discharge Location  Discharge Placement: Assisted Living  Met with patient for d/c planning. Patient alert and oriented x 3, independent of ADL's and lives at the [facility]. CM called the [facility] and they will call me back. Per Dr Brooke Miller d/c for 1/20/22 after IV antibiotics completed. Per PT/OT patient has no further needs for therapy and is okay to return to [facility]. Patient requires no DME and states the [facility] provides transportation for her. She has [employer] and able to obtain her medications. PCP is Dr Dewey Laird who she saw 3 months ago. Patient asked CM to call daughter to see if she had any questions. CM contacted daughter Cheyanne Mittal [phone] and she is in agreement with returning to the [facility] when medically stable. D/C plan is return to [facility] at [place] [address].

## 2022-01-17 NOTE — PROGRESS NOTES
END OF SHIFT NOTE: 7p ~ [facility]  Patient Vitals for the past 12 hrs:   Temp Pulse Resp BP SpO2   01/17/22 0220 98.4 °F (36.9 °C) 67 18 125/62 96 %   01/16/22 2223 98.3 °F (36.8 °C) 62 18 127/63 96 %       Pain Assessment  Pain 1  Pain Scale 1: Numeric (0 - 10) (01/17/22 0220)  Pain Intensity 1: 0 (01/17/22 0220)  Patient Stated Pain Goal: 0 (01/17/22 0220)  Pain Reassessment 1: Patient resting w/respiratory rate greater than 10 (01/16/22 1245)  Pain Location 1: Pelvis (01/15/22 1513)    Ambulating  Yes    Additional Information: Pt confused throughout shift. Constantly getting OOB, requiring staff to sit with patient. Bed alarm on for safety. Attempted to reorient without success. Continues on IVAB. Shift report given to oncoming nurse at the bedside.     Fredy Kaye RN

## 2022-01-17 NOTE — PROGRESS NOTES
Problem: Self Care Impaired (Adult and Pediatric)  Goal: *Acute Goals and Plan of Care (Insert Text)  Outcome: DISCHARGE GOALS  1. Patient will complete lower body bathing and dressing INDEPENDENTLY. 2. Patient will complete toileting INDEPENDENTLY. 3. Patient will complete grooming ADL INDEPENDENTLY. 4. Patient will tolerate 25 minutes of OT treatment with 1-2 rest breaks to increase activity tolerance for ADLs. 5. Patient will complete functional transfers INDEPENDENTLY  6. Patient will tolerate 10 minutes BUE exercises to increase strength for safe, functional transfers. Timeframe: 7 visits   OCCUPATIONAL THERAPY: Initial Assessment and Daily Note 1/17/2022  INPATIENT: OT Visit Days: 1  Payor: Davi Blancas / Plan: [employer] / Product Type: [employer] /      NAME/AGE/GENDER: Lindsey Kingston is a 80 y.o. female   PRIMARY DIAGNOSIS:  UTI (urinary tract infection) [N39.0] Urinary tract infection due to extended-spectrum beta lactamase (ESBL)-producing Klebsiella Urinary tract infection due to extended-spectrum beta lactamase (ESBL)-producing Klebsiella       ICD-10: Treatment Diagnosis:    Generalized Muscle Weakness (M62.81)  Difficulty in walking, Not elsewhere classified (R26.2)  Other abnormalities of gait and mobility (R26.89)   Precautions/Allergies:     Codeine, Contrast agent [iodine], and Pcn [penicillins]      ASSESSMENT:     Ms. Taiwo Mcclain is a 81 y/o female presents with UTI. Pt admitted from [facility] and is normally independent with all ADLs. Pt AAO x1 today and with hx of dementia. Daughter present to confirm and provide PLOF. Today pt presents with decreased activity tolerance and balance impacting ADLs. Pt overall supervision for functional transfers, but SBA-CGA for dynamic standing balance during ADLs. Pt is impulsive at times and with LOB when attempting LE dressing sitting/standing from chair and when making turns ambulating.  Pt requires frequent redirection and cues for attention to task due to hx of dementia. Pt is currently functioning close to baseline and would benefit from 1-2 more skilled OT treatments to address OT goals and plan of care. No further needs at d/c. This section established at most recent assessment   PROBLEM LIST (Impairments causing functional limitations):  Decreased Strength  Decreased ADL/Functional Activities  Decreased Transfer Abilities  Decreased Balance   INTERVENTIONS PLANNED: (Benefits and precautions of occupational therapy have been discussed with the patient.)  Activities of daily living training  Balance training  Neuromuscular re-eduation  Therapeutic activity  Therapeutic exercise     TREATMENT PLAN: Frequency/Duration: Follow patient 3x/week to address above goals. Rehabilitation Potential For Stated Goals: Excellent     REHAB RECOMMENDATIONS (at time of discharge pending progress):    Placement: It is my opinion, based on this patient's performance to date, that Ms. Katie Lemso may benefit from being discharged with NO further skilled therapy due to the high likelihood of returning to baseline. Equipment:   None at this time pt has SC at home              [facility]:   History of Present Injury/Illness (Reason for Referral): Admitted with UTI  Past Medical History/Comorbidities:   Ms. Katie Lemos  has a past medical history of Anxiety, Diabetes ([facility] Diarrhea (1/8/2019), Encephalopathy (1/23/2019), Endocrine disease, Hypertension, Hyponatremia (1/8/2019), Kidney stones, Menopause, Metabolic encephalopathy (7/55/1152), and Renal mass. Ms. Katie Lemos  has a past surgical history that includes hx hysterectomy; hx oophorectomy; pr lap,cholecystectomy; and pr removal of kidney stone.   Social History/Living Environment:   Home Environment: [address] Name: Vivian Pollack  # Steps to Enter: 0  One/Two Story Residence: One story  Living Alone: No  Support Systems: Assisted Living (Patient lives at the [facility] [phone])  Patient Expects to be Discharged to[de-identified] Assisted living  Current DME Used/Available at Home:  (none)  Tub or Shower Type: Shower  Prior Level of Function/Work/Activity:  Bathing: mod I with SC  Dressing, grooming, toileting: independent     Number of Personal Factors/Comorbidities that affect the Plan of Care: Brief history (0):  LOW COMPLEXITY   ASSESSMENT OF OCCUPATIONAL PERFORMANCE[de-identified]   Activities of Daily Living:   Basic ADLs (From Assessment) Complex ADLs (From Assessment)   Oral Facial Hygiene/Grooming: Stand-by assistance (decreased dynamic standing)  Lower Body Dressing: Stand-by assistance (decreased dynamic standing)     Grooming/Bathing/Dressing Activities of Daily Living   Grooming  Grooming Assistance: Stand-by assistance  Position Performed: Standing  Brushing/Combing Hair: Stand-by assistance (decreased dynamic standing) Cognitive Retraining  Safety/Judgement: Awareness of environment; Fall prevention                 Functional Transfers  Bathroom Mobility: Supervision/set up;Stand-by assistance   Lower Body Dressing Assistance  Socks: Stand-by assistance (decreased dynamic standing balance)  Position Performed: Standing;Seated in chair  Cues: Tactile cues provided Bed/Mat Mobility  Supine to Sit:  (NT)  Sit to Supine:  (NT)  Sit to Stand: Independent  Stand to Sit: Independent  Bed to Chair: Independent  Scooting: Independent     Most Recent Physical Functioning:   Gross Assessment:  AROM: Within functional limits  Strength: Generally decreased, functional  Coordination: Generally decreased, functional               Posture:     Balance:  Sitting: Intact; Without support  Standing: Intact; Without support  Standing - Static:  (fair+)  Standing - Dynamic :  (fair) Bed Mobility:  Supine to Sit:  (NT)  Sit to Supine:  (NT)  Scooting: Independent  Wheelchair Mobility:     Transfers:  Sit to Stand: Independent  Stand to Sit: Independent  Bed to Chair: Independent            Patient Vitals for the past 6 hrs:   BP BP Patient Position SpO2 Pulse   22 1120 (!) 175/50 Sitting 98 % 70   22 1520 (!) 154/68 Sitting 96 % 65       Mental Status  Neurologic State: Alert,Confused  Orientation Level: Disoriented to situation,Oriented to person (partially to place & time)  Cognition: Decreased attention/concentration,Impaired decision making,Impulsive  Perception: Appears intact  Safety/Judgement: Awareness of environment,Fall prevention            LLE Assessment  LLE Assessment (WDL): Exception to WDL RLE Assessment  RLE Assessment (WDL): Exceptions to [facility]           Physical Skills Involved:  Balance  Activity Tolerance Cognitive Skills Affected (resulting in the inability to perform in a timely and safe manner):  Perception  Executive Function  Short Term Recall  Long Term Memory  Sustained Attention Psychosocial Skills Affected:  Habits/Routines  Environmental Adaptation  Social Interaction  Self-Awareness  Safety Awareness   Number of elements that affect the Plan of Care: 1-3:  LOW COMPLEXITY   CLINICAL DECISION MAKIN[address] [place] [id number] AM-PAC 6 Clicks   Daily Activity Inpatient Short Form  How much help from another person does the patient currently need. .. Total A Lot A Little None   1. Putting on and taking off regular lower body clothing? [] 1   [] 2   [x] 3   [] 4   2. Bathing (including washing, rinsing, drying)? [] 1   [] 2   [x] 3   [] 4   3. Toileting, which includes using toilet, bedpan or urinal?   [] 1   [] 2   [x] 3   [] 4   4. Putting on and taking off regular upper body clothing? [] 1   [] 2   [x] 3   [] 4   5. Taking care of personal grooming such as brushing teeth? [] 1   [] 2   [x] 3   [] 4   6. Eating meals? [] 1   [] 2   [] 3   [x] 4   © , Trustees of [address] [place] [address] [address] [address], under license to [employer].  All rights reserved      Score:  Initial: 19 Most Recent: X (Date: -- )    Interpretation of Tool: Represents activities that are increasingly more difficult (i.e. Bed mobility, Transfers, Gait). Medical Necessity:     Patient is expected to demonstrate progress in dynamic balance and functional transfers to return to [facility] with no further therapy needs. Reason for Services/Other Comments:  Acute weakness and decreased balance during ADLs. Use of outcome tool(s) and clinical judgement create a POC that gives a: LOW COMPLEXITY         TREATMENT:   (In addition to Assessment/Re-Assessment sessions the following treatments were rendered)     Pre-treatment Symptoms/Complaints:    Pain: Initial:   Pain Intensity 1: 0  Post Session:  0     Self Care: (10): Procedure(s) (per grid) utilized to improve and/or restore self-care/home management as related to dressing, grooming and functional transfers in prep for ADLs and ambulating household distances. Required minimal verbal, manual and tactile cueing to facilitate activities of daily living skills. Braces/Orthotics/Lines/Etc:   O2 Device: None (Room air)  Treatment/Session Assessment:    Response to Treatment:  Good, in chair with daughter at bedside  Interdisciplinary Collaboration:   Registered Nurse  After treatment position/precautions:   Up in chair  RN notified  Family at bedside   Compliance with Program/Exercises: Will assess as treatment progresses. Recommendations/Intent for next treatment session: \"Next visit will focus on advancements to more challenging activities and reduction in assistance provided\".   Total Treatment Duration:  OT Patient Time In/Time Out  Time In: 1157  Time Out: [address], OT

## 2022-01-17 NOTE — ASSESSMENT & PLAN NOTE
Daughter present today providing history of renal stones requiring intervention by urology.   CT 1/17 with nonobstructing stones in left pole, upper right pole cortical mass.  -Consult urology  -CT renal mass    1/18: New consult placed, CT with premedication ordered

## 2022-01-17 NOTE — PROGRESS NOTES
Hospitalist Progress Note   Admit Date:  1/15/2022  4:39 PM   Name:  Lamar Ojeda   Age:  80 y.o. Sex:  female  :  1940   MRN:  [id number]   Room:  Critical access hospital/    Presenting Complaint: Urinary Pain    Reason(s) for Admission: UTI (urinary tract infection) [N39.0]     Hospital Course & Interval History:   81F PMHx advanced dementia, HTN who presented 1/15 to ED with her daughter after being told she needed IV antibiotics for a resistant UTI. Patient was seen at an outpatient clinic on 22, diagnosed with a UTI, and started on Macrobid. She was told today that the urine culture was growing a resistant bacteria which necessitates IV antibiotics. Urine culture was growing Klebsiella which is only sensitive to cefepime, zosyn, gentamycin. Hospitalist to admit. Subjective (22):  Daughter is able to be present during today's encounter. She reports that her mother has a history of complicated stones requiring intervention by urology. She also reports that the confusion and anxiety have been previously well controlled with Xanax. Ms. Yolanda Peacock has no acute complaints but does periodically become anxious and reports ringing in ears which her daughter reports is provoked by anxiety. Assessment & Plan:   * Urinary tract infection due to extended-spectrum beta lactamase (ESBL)-producing Klebsiella  Klebsiella resistant to PO antibiotics, ER started on cefepime, will continue as per culture results, appears this has the best NURY  - Not currently febrile, continue to monitor  - Monitor CBC as well, normal WBC currently  - cefepime EOT : VSS, BCx NGTD, continue cefepime    History of nephrolithotomy with removal of calculi  Daughter present today providing history of renal stones requiring intervention by urology.   -CT abdomen pelvis without contrast  -Consider urologic consult based on findings    Dementia without behavioral disturbance ([address]  Oriented to person at baseline  -Donepezil, bupropion, alprazolam    1/17: Confabulates and appears appropriate but has cleare memory deficits    ANJANA (acute kidney injury) ([facility]  Resolved 1/16    Type 2 diabetes mellitus with hyperglycemia, with long-term current use of insulin (HCC)  - Home insulin  - Diabetic diet    Primary hypertension  Admission VSS  -Spironolactone, valsartan        Dispo/Discharge Planning:    Return to memory care    Diet:  ADULT DIET Regular; 4 carb choices (60 gm/meal)  DVT PPx: Enoxaparin  Code status: Full Code    Hospital Problems as of 1/17/2022 Date Reviewed: 1/16/2022          Codes Class Noted - Resolved POA    * (Principal) Urinary tract infection due to extended-spectrum beta lactamase (ESBL)-producing Klebsiella ICD-10-CM: N39.0, B96.89  ICD-9-CM: 599.0, 041.84  1/15/2022 - Present Yes        Dementia without behavioral disturbance (HCC) (Chronic) ICD-10-CM: F03.90  ICD-9-CM: 294.20  1/15/2022 - Present Yes        ANJANA (acute kidney injury) ([facility] ICD-10-CM: N17.9  ICD-9-CM: 584.9  1/15/2022 - Present Yes        Primary hypertension (Chronic) ICD-10-CM: I10  ICD-9-CM: 401.9  1/8/2019 - Present Yes        Type 2 diabetes mellitus with hyperglycemia, with long-term current use of insulin (HCC) (Chronic) ICD-10-CM: E11.65, Z79.4  ICD-9-CM: 250.00, 790.29, V58.67  1/8/2019 - Present Yes              Objective:     Patient Vitals for the past 24 hrs:   Temp Pulse Resp BP SpO2   01/17/22 0220 98.4 °F (36.9 °C) 67 18 125/62 96 %   01/16/22 2223 98.3 °F (36.8 °C) 62 18 127/63 96 %   01/16/22 1907 98 °F (36.7 °C) 69 18 (!) 169/61 97 %   01/16/22 1507 98 °F (36.7 °C) 73 18 (!) 159/63 96 %   01/16/22 1125 98.3 °F (36.8 °C) 78 18 (!) 142/57 98 %   01/16/22 0757 98 °F (36.7 °C) (!) 57 18 (!) 122/58 99 %     Oxygen Therapy  O2 Sat (%): 96 % (01/17/22 0220)  O2 Device: None (Room air) (01/17/22 0220)    Estimated body mass index is 21.37 kg/m² as calculated from the following:    Height as of this encounter: 5' 5\" (1.651 m). Weight as of this encounter: 58.3 kg (128 lb 7 oz). Intake/Output Summary (Last 24 hours) at 1/17/2022 0728  Last data filed at 1/17/2022 0618  Gross per 24 hour   Intake 2248 ml   Output 1900 ml   Net 348 ml       Blood pressure 125/62, pulse 67, temperature 98.4 °F (36.9 °C), resp. rate 18, height 5' 5\" (1.651 m), weight 58.3 kg (128 lb 7 oz), SpO2 96 %. Physical Exam  Vitals and nursing note reviewed. Constitutional:       General: She is not in acute distress. Appearance: Normal appearance. Comments: Affable   HENT:      Head: Normocephalic. Eyes:      Extraocular Movements: Extraocular movements intact. Cardiovascular:      Rate and Rhythm: Normal rate. Pulses:           Radial pulses are 2+ on the left side. Pulmonary:      Effort: Pulmonary effort is normal. No respiratory distress. Musculoskeletal:         General: No deformity. Cervical back: No rigidity. Skin:     General: Skin is warm and dry. Neurological:      General: No focal deficit present. Mental Status: She is alert. She is disoriented. Psychiatric:         Mood and Affect: Mood normal.         Behavior: Behavior normal.         Cognition and Memory: She exhibits impaired recent memory. She does not exhibit impaired remote memory.          I have reviewed ordered lab tests and independently visualized imaging below:    Recent Labs:  Recent Results (from the past 48 hour(s))   CBC WITH AUTOMATED DIFF    Collection Time: 01/15/22  3:32 PM   Result Value Ref Range    WBC 8.7 4.3 - 11.1 K/uL    RBC 4.41 4.05 - 5.2 M/uL    HGB 13.6 11.7 - 15.4 g/dL    HCT 40.8 35.8 - 46.3 %    MCV 92.5 79.6 - 97.8 FL    MCH 30.8 26.1 - 32.9 PG    MCHC 33.3 31.4 - 35.0 g/dL    RDW 12.7 11.9 - 14.6 %    PLATELET 339 703 - 194 K/uL    MPV 10.9 9.4 - 12.3 FL    ABSOLUTE NRBC 0.00 0.0 - 0.2 K/uL    NEUTROPHILS 67 43 - 78 %    LYMPHOCYTES 23 13 - 44 %    MONOCYTES 6 4.0 - 12.0 %    EOSINOPHILS 2 0.5 - 7.8 % BASOPHILS 1 0.0 - 2.0 %    IMMATURE GRANULOCYTES 1 0.0 - 5.0 %    ABS. NEUTROPHILS 5.9 1.7 - 8.2 K/UL    ABS. LYMPHOCYTES 2.0 0.5 - 4.6 K/UL    ABS. MONOCYTES 0.5 0.1 - 1.3 K/UL    ABS. EOSINOPHILS 0.1 0.0 - 0.8 K/UL    ABS. BASOPHILS 0.1 0.0 - 0.2 K/UL    ABS. IMM. GRANS. 0.0 0.0 - 0.5 K/UL    DF AUTOMATED     METABOLIC PANEL, COMPREHENSIVE    Collection Time: 01/15/22  3:32 PM   Result Value Ref Range    Sodium 136 136 - 145 mmol/L    Potassium 4.6 3.5 - 5.1 mmol/L    Chloride 107 98 - 107 mmol/L    CO2 25 21 - 32 mmol/L    Anion gap 4 (L) 7 - 16 mmol/L    Glucose 246 (H) 65 - 100 mg/dL    BUN 28 (H) 8 - 23 MG/DL    Creatinine 1.30 (H) 0.6 - 1.0 MG/DL    GFR est AA 51 (L) >60 ml/min/1.73m2    GFR est non-AA 42 (L) >60 ml/min/1.73m2    Calcium 10.0 8.3 - 10.4 MG/DL    Bilirubin, total 0.6 0.2 - 1.1 MG/DL    ALT (SGPT) 38 12 - 65 U/L    AST (SGOT) 26 15 - 37 U/L    Alk.  phosphatase 93 50 - 130 U/L    Protein, total 7.6 6.3 - 8.2 g/dL    Albumin 4.0 3.2 - 4.6 g/dL    Globulin 3.6 (H) 2.3 - 3.5 g/dL    A-G Ratio 1.1 (L) 1.2 - 3.5     PROCALCITONIN    Collection Time: 01/15/22  3:32 PM   Result Value Ref Range    Procalcitonin <0.05 0.00 - 0.49 ng/mL   CULTURE, BLOOD    Collection Time: 01/15/22  5:41 PM    Specimen: Blood   Result Value Ref Range    Special Requests: LEFT  Antecubital        Culture result: NO GROWTH 2 DAYS     CULTURE, BLOOD    Collection Time: 01/15/22  5:41 PM    Specimen: Blood   Result Value Ref Range    Special Requests: RIGHT  FOREARM        Culture result: NO GROWTH 2 DAYS     URINE MICROSCOPIC    Collection Time: 01/15/22  5:41 PM   Result Value Ref Range    WBC  0 /hpf    RBC 0-3 0 /hpf    Epithelial cells 0-3 0 /hpf    Bacteria 2+ (H) 0 /hpf    Casts HYALINE 0 /lpf    Crystals, urine 0 0 /LPF    Mucus 0 0 /lpf    Other observations RESULTS VERIFIED MANUALLY     COVID-19 RAPID TEST    Collection Time: 01/15/22  7:13 PM   Result Value Ref Range    Specimen source NASAL SWAB COVID-19 rapid test Not detected NOTD     METABOLIC PANEL, BASIC    Collection Time: 01/16/22  5:23 AM   Result Value Ref Range    Sodium 138 136 - 145 mmol/L    Potassium 4.2 3.5 - 5.1 mmol/L    Chloride 110 (H) 98 - 107 mmol/L    CO2 27 21 - 32 mmol/L    Anion gap 1 (L) 7 - 16 mmol/L    Glucose 196 (H) 65 - 100 mg/dL    BUN 22 8 - 23 MG/DL    Creatinine 0.98 0.6 - 1.0 MG/DL    GFR est AA >60 >60 ml/min/1.73m2    GFR est non-AA 58 (L) >60 ml/min/1.73m2    Calcium 9.6 8.3 - 10.4 MG/DL   CBC W/O DIFF    Collection Time: 01/16/22  5:23 AM   Result Value Ref Range    WBC 6.1 4.3 - 11.1 K/uL    RBC 3.77 (L) 4.05 - 5.2 M/uL    HGB 11.5 (L) 11.7 - 15.4 g/dL    HCT 35.1 (L) 35.8 - 46.3 %    MCV 93.1 79.6 - 97.8 FL    MCH 30.5 26.1 - 32.9 PG    MCHC 32.8 31.4 - 35.0 g/dL    RDW 12.8 11.9 - 14.6 %    PLATELET 995 210 - 691 K/uL    MPV 11.4 9.4 - 12.3 FL    ABSOLUTE NRBC 0.00 0.0 - 0.2 K/uL   GLUCOSE, POC    Collection Time: 01/16/22  8:05 AM   Result Value Ref Range    Glucose (POC) 179 (H) 65 - 100 mg/dL    Performed by ReynaNicole    GLUCOSE, POC    Collection Time: 01/16/22  4:07 PM   Result Value Ref Range    Glucose (POC) 208 (H) 65 - 100 mg/dL    Performed by ReynaNicole    CBC W/O DIFF    Collection Time: 01/17/22  6:29 AM   Result Value Ref Range    WBC 5.4 4.3 - 11.1 K/uL    RBC 3.93 (L) 4.05 - 5.2 M/uL    HGB 11.9 11.7 - 15.4 g/dL    HCT 35.9 35.8 - 46.3 %    MCV 91.3 79.6 - 97.8 FL    MCH 30.3 26.1 - 32.9 PG    MCHC 33.1 31.4 - 35.0 g/dL    RDW 12.7 11.9 - 14.6 %    PLATELET 774 058 - 777 K/uL    MPV 11.2 9.4 - 12.3 FL    ABSOLUTE NRBC 0.00 0.0 - 0.2 K/uL       All Micro Results     Procedure Component Value Units Date/Time    CULTURE, BLOOD [[id number]] Collected: 01/15/22 1741    Order Status: Completed Specimen: Blood Updated: 01/17/22 0616     Special Requests: --        LEFT  Antecubital       Culture result: NO GROWTH 2 DAYS       CULTURE, BLOOD [[id number]] Collected: 01/15/22 1741    Order Status: Completed Specimen: Blood Updated: 01/17/22 0616     Special Requests: --        RIGHT  FOREARM       Culture result: NO GROWTH 2 DAYS       COVID-19 RAPID TEST [[id number]] Collected: 01/15/22 1913    Order Status: Completed Specimen: Nasopharyngeal Updated: 01/15/22 1945     Specimen source NASAL SWAB        COVID-19 rapid test Not detected        Comment:      The specimen is NEGATIVE for SARS-CoV-2, the novel coronavirus associated with COVID-19. A negative result does not rule out COVID-19. This test has been authorized by the FDA under an Emergency Use Authorization (EUA) for use by authorized laboratories. Fact sheet for Healthcare Providers: [employer]  Fact sheet for Patients: Ret[employer].nz       Methodology: Isothermal Nucleic Acid Amplification               Other Studies:  No results found.     Current Meds:  Current Facility-Administered Medications   Medication Dose Route Frequency    buPROPion SR (WELLBUTRIN SR) tablet 150 mg  150 mg Oral BID    atorvastatin (LIPITOR) tablet 40 mg  40 mg Oral QHS    valsartan (DIOVAN) tablet 320 mg  320 mg Oral DAILY    spironolactone (ALDACTONE) tablet 25 mg  25 mg Oral DAILY    ALPRAZolam (XANAX) tablet 0.25 mg  0.25 mg Oral QHS    donepeziL (ARICEPT) tablet 10 mg  10 mg Oral QHS    sodium chloride (NS) flush 5-40 mL  5-40 mL IntraVENous Q8H    sodium chloride (NS) flush 5-40 mL  5-40 mL IntraVENous PRN    acetaminophen (TYLENOL) tablet 650 mg  650 mg Oral Q6H PRN    Or    acetaminophen (TYLENOL) suppository 650 mg  650 mg Rectal Q6H PRN    polyethylene glycol (MIRALAX) packet 17 g  17 g Oral DAILY PRN    ondansetron (ZOFRAN ODT) tablet 4 mg  4 mg Oral Q8H PRN    Or    ondansetron (ZOFRAN) injection 4 mg  4 mg IntraVENous Q6H PRN    enoxaparin (LOVENOX) injection 40 mg  40 mg SubCUTAneous DAILY    lactated Ringers infusion  75 mL/hr IntraVENous CONTINUOUS    cefepime (MAXIPIME) 1 g in 0.9% sodium chloride (MBP/ADV) 50 mL MBP  1 g IntraVENous Q24H    insulin NPH (NOVOLIN N, HUMULIN N) injection 7 Units  7 Units SubCUTAneous ACB&D    And    insulin regular (NOVOLIN R, HUMULIN R) injection 3 Units  3 Units SubCUTAneous ACB&D       Signed:  Elsworth Scheuermann, MD

## 2022-01-17 NOTE — PROGRESS NOTES
Problem: Mobility Impaired (Adult and Pediatric)  Goal: *Acute Goals and Plan of Care (Insert Text)  Outcome: GOALS RESOLVED. SFPT  Note: ALL GOALS MET 1/17/22 :  (1.)Ms. Pamela Kent will transfer from bed to chair and chair to bed with INDEPENDENT. (2.)Ms. Pamela Kent will ambulate with INDEPENDENT for 200 feet. PHYSICAL THERAPY: Initial Assessment, Discharge, and AM 1/17/2022  INPATIENT: PT Visit Days : 1  Payor: [employer] / Plan: [email or web address] / Product Type: Managed Care Medicare /       NAME/AGE/GENDER: Walker Owen is a 80 y.o. female   PRIMARY DIAGNOSIS: UTI (urinary tract infection) [N39.0] Urinary tract infection due to extended-spectrum beta lactamase (ESBL)-producing Klebsiella Urinary tract infection due to extended-spectrum beta lactamase (ESBL)-producing Klebsiella        ICD-10: Treatment Diagnosis:    Generalized Muscle Weakness (M62.81)   Precaution/Allergies:  Codeine, Contrast agent [iodine], and Pcn [penicillins]      ASSESSMENT:     Ms. Pamela Kent presents as somewhat confused  (oriented to person, partially to place & time & not to situation) . Pt was impulsive but was safe & functional with gait & transfers, no skilled PT follow up indicated at this time. PT advised pt & daughter to walk the MS alejandro 3 x a day to keep up her strength, balance & endurance while recovering from UTI in the hospital. DC PT services for now. This section established at most recent assessment   PROBLEM LIST (Impairments causing functional limitations):  NA   INTERVENTIONS PLANNED: (Benefits and precautions of physical therapy have been discussed with the patient.)  NA     TREATMENT PLAN: Frequency/Duration: NA  Rehabilitation Potential For Stated Goals:  NA     REHAB RECOMMENDATIONS (at time of discharge pending progress):    Placement: It is my opinion, based on this patient's performance to date, that Ms. Pamela Kent may benefit from being discharged with NO further skilled therapy due to a proven ability to function at baseline and all goals being met. Equipment:   None at this time              HISTORY:   History of Present Injury/Illness (Reason for Referral): Sachin Short is a 80 y.o. female with medical history of dementia, HTN who presented to ED with her daughter after being told she needed IV antibiotics for a resistant UTI. Patient was seen at an outpatient clinic on 1/11/22, diagnosed with a UTI, and started on Macrobid. She was told today that the urine culture is growing a resistant bacteria which necessitates IV antibiotics. Urine culture is growing Klebsiella which is only sensitive to cefepime, zosyn, gentamycin. Hospitalist to admit. Past Medical History/Comorbidities:   Ms. Sumit Orosco  has a past medical history of Anxiety, Diabetes ([facility] [address] Diarrhea (1/8/2019), Encephalopathy (1/23/2019), Endocrine disease, Hypertension, Hyponatremia (1/8/2019), Kidney stones, Menopause, Metabolic encephalopathy (9/34/5148), and Renal mass. Ms. Sumit Orosco  has a past surgical history that includes hx hysterectomy; hx oophorectomy; pr lap,cholecystectomy; and pr removal of kidney stone. Social History/Living Environment:   [facility] [address] Name: Daija Zhou  # Steps to Enter: 0  One/Two Story Residence: One story  Living Alone: No  Support Systems:  (support staff)  Patient Expects to be Discharged to[de-identified] Assisted living  Current DME Used/Available at Home:  (none)  Prior Level of Function/Work/Activity:    Personal Factors: Other factors that influence how disability is experienced by the patient:  current & PMH   Number of Personal Factors/Comorbidities that affect the Plan of Care: 3+: HIGH COMPLEXITY   EXAMINATION:   Most Recent Physical Functioning:   Gross Assessment:  AROM: Within functional limits (all limbs & core)  Strength:  Within functional limits (all limbs & core)  Coordination: Within functional limits (all limbs & core) Balance:  Sitting: Intact; Without support  Standing: Intact; Without support Bed Mobility:  Supine to Sit:  (NT)  Sit to Supine:  (NT)  Scooting: Independent       Transfers:  Sit to Stand: Independent  Stand to Sit: Independent  Bed to Chair: Independent  Gait:     Speed/Nessa: Fluctuations  Gait Abnormalities: Decreased step clearance  Distance (ft):  (additional 200ft after an intial 25ft gait assessment)  Assistive Device:  (none)  Ambulation - Level of Assistance: Independent  Home Environment: Assisted living  Home Situation  Home Environment: Assisted living  [address] [place] [address] Hai Name: Ga Mg  # Steps to Enter: 0  One/Two Story Residence: One story  Living Alone: No  Support Systems:  (support staff)  Patient Expects to be Discharged to[de-identified] Assisted living  Current DME Used/Available at Home:  (none)   Functional Mobility:         Gait/Ambulation:  Ind        Transfers:  Ind        Bed Mobility: Ind   Body Structures Involved:  Metabolic  Muscles Body Functions Affected: Movement Related  Metobolic/Endocrine Activities and Participation Affected:  General Tasks and Demands  Mobility   Number of elements that affect the Plan of Care: 4+: HIGH COMPLEXITY   CLINICAL PRESENTATION:   Presentation: Stable and uncomplicated: LOW COMPLEXITY   CLINICAL DECISION MAKING:   M MIRAGE AM-PAC 6 Clicks   Basic Mobility Inpatient Short Form  How much difficulty does the patient currently have. .. Unable A Lot A Little None   1. Turning over in bed (including adjusting bedclothes, sheets and blankets)? [] 1   [] 2   [] 3   [x] 4   2. Sitting down on and standing up from a chair with arms ( e.g., wheelchair, bedside commode, etc.)   [] 1   [] 2   [] 3   [x] 4   3. Moving from lying on back to sitting on the side of the bed? [] 1   [] 2   [] 3   [x] 4   How much help from another person does the patient currently need. .. Total A Lot A Little None   4.   Moving to and from a bed to a chair (including a wheelchair)? [] 1   [] 2   [] 3   [] 4   5. Need to walk in hospital room? [] 1   [] 2   [] 3   [x] 4   6. Climbing 3-5 steps with a railing? [] 1   [] 2   [x] 3   [] 4   © 2007, Trustees of [employer], under license to [employer]. All rights reserved      Score:  Initial: 23 Most Recent: X (Date: -- )    Interpretation of Tool:  Represents activities that are increasingly more difficult (i.e. Bed mobility, Transfers, Gait). Medical Necessity:     No PT follow up. Reason for Services/Other Comments:  No PT follow up. Use of outcome tool(s) and clinical judgement create a POC that gives a: Clear prediction of patient's progress: LOW COMPLEXITY            TREATMENT:   (In addition to Assessment/Re-Assessment sessions the following treatments were rendered)   Pre-treatment Symptoms/Complaints:  none  Pain: Initial: numeric scale  Pain Intensity 1: 0  Post Session:  0/10     Therapeutic Activity: (    15 min( extra time to work through activity noted): Therapeutic activities including standing balance activity (repeated box stepping, repeated reaching out in front & overhead & repeated chop lift trunk rotation to R & L, progressive gait training an additional 200 ft after an initial 25 ft gait assessment to improve mobility, strength, balance, coordination, and dynamic movement of arm - bilateral, leg - bilateral, and core to improve functional endurance & stability . Assessment    Braces/Orthotics/Lines/Etc:   IV  Treatment/Session Assessment:    Response to Treatment:  no concerns, tolerated well  Interdisciplinary Collaboration:   Registered Nurse    After treatment position/precautions:   Up in chair  Bed/Chair-wheels locked  Caregiver at bedside  Call light within reach  RN notified  Family at bedside   Compliance with Program/Exercises: Compliant all of the time  Recommendations/ DC PT services.   Total Treatment Duration:  PT Patient Time In/Time Out  Time In: 1252  Time Out: [address] [facility] [address] Leonard

## 2022-01-18 LAB
ANION GAP SERPL CALC-SCNC: 6 MMOL/L (ref 7–16)
BUN SERPL-MCNC: 18 MG/DL (ref 8–23)
CALCIUM SERPL-MCNC: 9.8 MG/DL (ref 8.3–10.4)
CHLORIDE SERPL-SCNC: 108 MMOL/L (ref 98–107)
CO2 SERPL-SCNC: 26 MMOL/L (ref 21–32)
CREAT SERPL-MCNC: 0.95 MG/DL (ref 0.6–1)
ERYTHROCYTE [DISTWIDTH] IN BLOOD BY AUTOMATED COUNT: 12.6 % (ref 11.9–14.6)
GLUCOSE BLD STRIP.AUTO-MCNC: 302 MG/DL (ref 65–100)
GLUCOSE SERPL-MCNC: 181 MG/DL (ref 65–100)
HCT VFR BLD AUTO: 34.9 % (ref 35.8–46.3)
HGB BLD-MCNC: 11.5 G/DL (ref 11.7–15.4)
MCH RBC QN AUTO: 30.3 PG (ref 26.1–32.9)
MCHC RBC AUTO-ENTMCNC: 33 G/DL (ref 31.4–35)
MCV RBC AUTO: 91.8 FL (ref 79.6–97.8)
NRBC # BLD: 0 K/UL (ref 0–0.2)
PLATELET # BLD AUTO: 183 K/UL (ref 150–450)
PMV BLD AUTO: 11.6 FL (ref 9.4–12.3)
POTASSIUM SERPL-SCNC: 3.4 MMOL/L (ref 3.5–5.1)
RBC # BLD AUTO: 3.8 M/UL (ref 4.05–5.2)
SERVICE CMNT-IMP: ABNORMAL
SODIUM SERPL-SCNC: 140 MMOL/L (ref 136–145)
WBC # BLD AUTO: 4.3 K/UL (ref 4.3–11.1)

## 2022-01-18 PROCEDURE — 74011250637 HC RX REV CODE- 250/637: Performed by: FAMILY MEDICINE

## 2022-01-18 PROCEDURE — 82962 GLUCOSE BLOOD TEST: CPT

## 2022-01-18 PROCEDURE — 99222 1ST HOSP IP/OBS MODERATE 55: CPT | Performed by: INTERNAL MEDICINE

## 2022-01-18 PROCEDURE — 74011000258 HC RX REV CODE- 258: Performed by: FAMILY MEDICINE

## 2022-01-18 PROCEDURE — 74011250636 HC RX REV CODE- 250/636: Performed by: FAMILY MEDICINE

## 2022-01-18 PROCEDURE — 36415 COLL VENOUS BLD VENIPUNCTURE: CPT

## 2022-01-18 PROCEDURE — 74011636637 HC RX REV CODE- 636/637: Performed by: FAMILY MEDICINE

## 2022-01-18 PROCEDURE — 85027 COMPLETE CBC AUTOMATED: CPT

## 2022-01-18 PROCEDURE — 74011000250 HC RX REV CODE- 250: Performed by: FAMILY MEDICINE

## 2022-01-18 PROCEDURE — 65270000029 HC RM PRIVATE

## 2022-01-18 PROCEDURE — 80048 BASIC METABOLIC PNL TOTAL CA: CPT

## 2022-01-18 RX ORDER — DIPHENHYDRAMINE HYDROCHLORIDE 50 MG/ML
25 INJECTION, SOLUTION INTRAMUSCULAR; INTRAVENOUS ONCE
Status: COMPLETED | OUTPATIENT
Start: 2022-01-19 | End: 2022-01-18

## 2022-01-18 RX ORDER — POTASSIUM CHLORIDE 20 MEQ/1
40 TABLET, EXTENDED RELEASE ORAL 2 TIMES DAILY
Status: COMPLETED | OUTPATIENT
Start: 2022-01-18 | End: 2022-01-18

## 2022-01-18 RX ADMIN — POTASSIUM CHLORIDE 40 MEQ: 1500 TABLET, EXTENDED RELEASE ORAL at 17:53

## 2022-01-18 RX ADMIN — SPIRONOLACTONE 25 MG: 25 TABLET ORAL at 08:21

## 2022-01-18 RX ADMIN — SODIUM CHLORIDE, PRESERVATIVE FREE 10 ML: 5 INJECTION INTRAVENOUS at 18:05

## 2022-01-18 RX ADMIN — BUPROPION HYDROCHLORIDE 150 MG: 150 TABLET, EXTENDED RELEASE ORAL at 21:28

## 2022-01-18 RX ADMIN — METHYLPREDNISOLONE SODIUM SUCCINATE 40 MG: 40 INJECTION, POWDER, FOR SOLUTION INTRAMUSCULAR; INTRAVENOUS at 23:58

## 2022-01-18 RX ADMIN — Medication 3 UNITS: at 17:53

## 2022-01-18 RX ADMIN — ATORVASTATIN CALCIUM 40 MG: 40 TABLET, FILM COATED ORAL at 21:28

## 2022-01-18 RX ADMIN — SODIUM CHLORIDE, PRESERVATIVE FREE 10 ML: 5 INJECTION INTRAVENOUS at 21:28

## 2022-01-18 RX ADMIN — Medication 3 UNITS: at 08:21

## 2022-01-18 RX ADMIN — SODIUM CHLORIDE, PRESERVATIVE FREE 10 ML: 5 INJECTION INTRAVENOUS at 05:12

## 2022-01-18 RX ADMIN — SODIUM CHLORIDE, SODIUM LACTATE, POTASSIUM CHLORIDE, AND CALCIUM CHLORIDE 75 ML/HR: 600; 310; 30; 20 INJECTION, SOLUTION INTRAVENOUS at 00:41

## 2022-01-18 RX ADMIN — POTASSIUM CHLORIDE 40 MEQ: 1500 TABLET, EXTENDED RELEASE ORAL at 09:15

## 2022-01-18 RX ADMIN — CEFEPIME HYDROCHLORIDE 1 G: 1 INJECTION, POWDER, FOR SOLUTION INTRAMUSCULAR; INTRAVENOUS at 17:53

## 2022-01-18 RX ADMIN — ALPRAZOLAM 0.25 MG: 0.5 TABLET ORAL at 12:48

## 2022-01-18 RX ADMIN — DONEPEZIL HYDROCHLORIDE 10 MG: 5 TABLET, FILM COATED ORAL at 21:27

## 2022-01-18 RX ADMIN — DIPHENHYDRAMINE HYDROCHLORIDE 25 MG: 50 INJECTION INTRAMUSCULAR; INTRAVENOUS at 23:58

## 2022-01-18 RX ADMIN — ENOXAPARIN SODIUM 40 MG: 100 INJECTION SUBCUTANEOUS at 08:21

## 2022-01-18 RX ADMIN — METHYLPREDNISOLONE SODIUM SUCCINATE 40 MG: 40 INJECTION, POWDER, FOR SOLUTION INTRAMUSCULAR; INTRAVENOUS at 17:54

## 2022-01-18 RX ADMIN — METHYLPREDNISOLONE SODIUM SUCCINATE 40 MG: 40 INJECTION, POWDER, FOR SOLUTION INTRAMUSCULAR; INTRAVENOUS at 12:24

## 2022-01-18 RX ADMIN — SODIUM CHLORIDE, SODIUM LACTATE, POTASSIUM CHLORIDE, AND CALCIUM CHLORIDE 75 ML/HR: 600; 310; 30; 20 INJECTION, SOLUTION INTRAVENOUS at 14:16

## 2022-01-18 RX ADMIN — Medication 7 UNITS: at 17:53

## 2022-01-18 RX ADMIN — ALPRAZOLAM 0.25 MG: 0.5 TABLET ORAL at 21:28

## 2022-01-18 RX ADMIN — VALSARTAN 320 MG: 320 TABLET, FILM COATED ORAL at 08:21

## 2022-01-18 RX ADMIN — BUPROPION HYDROCHLORIDE 150 MG: 150 TABLET, EXTENDED RELEASE ORAL at 08:21

## 2022-01-18 RX ADMIN — Medication 7 UNITS: at 08:21

## 2022-01-18 NOTE — CONSULTS
Palliative Care    Patient: Manuel Merrill MRN: [id number]  SSN: [id number]    YOB: 1940  Age: 80 y.o. Sex: female       Date of Request: 1/18/2022  Date of Consult:  1/18/2022  Reason for Consult:  goals of care and category status  Requesting Physician: Dr. Lambert Buck     Assessment/Plan:     Principal Diagnosis:    Pain, general  R52    Additional Diagnoses:   · Frailty  R54  · Counseling, Encounter for Medical Advice  Z71.9  · Encounter for Palliative Care  Z51.5    Palliative Performance Scale (PPS):       Medical Decision Making:   Reviewed and summarized labs and imaging from admission. Met with pt at bedside. Reviewed ongoing care and discussed current condition. Pt expressed her understanding and appreciation of care given. We discussed her overall wishes. She hopes to return to her home and notes she has some family nearby. We discussed code status and pt confirms wishes for DNR. Order placed. She is undergoing workup for renal mass with urology following. Will follow loosely     Will discuss findings with members of the interdisciplinary team.      Thank you for this referral.         Subjective:     History obtained from:  Patient and Chart    Chief Complaint: abdominal pain  History of Present Illness:  80 y.o. female with hx of RCC of R kidney s/p partial nephrectomy, renal stones, advanced dementia, HTN who presented 1/15 to ED with her daughter after being told she needed IV antibiotics for a resistant UTI.  Patient was seen at an outpatient clinic on 1/11/22, diagnosed with a UTI, and started on Macrobid.  She was told today that the urine culture was growing a resistant bacteria which necessitates IV antibiotics.  Urine culture was growing Klebsiella which is only sensitive to cefepime, zosyn, gentamycin. Hospitalist admitted and pt on cefepime.       Advance Directive: Yes       Code Status:  DNR            Health Care Power of : Yes - Copy of 8522 NotesFirst of  on file. Past Medical History:   Diagnosis Date    Anxiety     Diabetes ([facility]     Type 2- 140-160 avg- oral and insulin- hypo below 60 A1C 6.2 1/9/2019    Diarrhea 1/8/2019    Encephalopathy 1/23/2019    Endocrine disease     Hypertension     Hyponatremia 1/8/2019    Kidney stones     Menopause     Metabolic encephalopathy 3/14/5122    Renal mass       Past Surgical History:   Procedure Laterality Date    HX HYSTERECTOMY      HX OOPHORECTOMY      [place] LAP,CHOLECYSTECTOMY      [place] REMOVAL OF KIDNEY STONE       Family History   Problem Relation Age of Onset    Thyroid Disease Mother     Hypertension Mother     Diabetes Brother     Breast Cancer Neg Hx       Social History     Tobacco Use    Smoking status: Never Smoker    Smokeless tobacco: Never Used   Substance Use Topics    Alcohol use: No     Prior to Admission medications    Medication Sig Start Date End Date Taking? Authorizing Provider   donepeziL (ARICEPT) 10 mg tablet Take 1 Tab by mouth nightly. 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 Yes Provider, Historical   ALPRAZolam (XANAX) 0.25 mg tablet Take 0.25 mg by mouth nightly. Yes Provider, Historical   valsartan (DIOVAN) 320 mg tablet Take 320 mg by mouth daily. Yes Provider, Historical   buPROPion XL (WELLBUTRIN XL) 300 mg XL tablet Take 300 mg by mouth every morning. Yes Provider, Historical   atorvastatin (LIPITOR) 40 mg tablet Take 40 mg by mouth nightly. Yes Provider, Historical   insulin NPH/insulin regular (HUMULIN 70/30 U-100 INSULIN) 100 unit/mL (70-30) injection 10 Units by SubCUTAneous route two (2) times a day.  2/1/19  Yes Other, MD Sanaz   diphenhydrAMINE (BenadryL) 25 mg capsule Take both tabs 1 hour prior to CT scan  Patient not taking: Reported on 1/15/2022 10/5/20   Andra Maya MD       Allergies   Allergen Reactions    Codeine Other (comments)    Contrast Agent [Iodine] Hives     Needs allergy protocol prior to contrast    Pcn [Penicillins] Unknown (comments)        Comprehensive Review of systems completed and negative with exception of noted above     Objective:     Visit Vitals  BP (!) 158/56 (BP 1 Location: Left upper arm, BP Patient Position: Sitting)   Pulse 64   Temp 98 °F (36.7 °C)   Resp 18   Ht 5' 5\" (1.651 m)   Wt 128 lb (58.1 kg)   SpO2 100%   BMI 21.30 kg/m²        Physical Exam:    General:  Cooperative. No acute distress. Eyes:  Conjunctivae/corneas clear    Nose: Nares normal. Septum midline. Neck: Supple, symmetrical, trachea midline, no JVD   Lungs:   Clear to auscultation bilaterally, unlabored   Heart:  Regular rate and rhythm, no murmur    Abdomen:   Soft, non-tender, non-distended. Positive bowel sounds   Extremities: Normal, atraumatic, no cyanosis or edema   Skin: Skin color, texture, turgor normal. No rash or lesions.    Neurologic: Nonfocal   Psych: Alert and oriented      Assessment:     Hospital Problems  Date Reviewed: 1/16/2022          Codes Class Noted POA    History of nephrolithotomy with removal of calculi ICD-10-CM: Z98.890, Z87.442  ICD-9-CM: V15.29  1/17/2022 Yes        * (Principal) Urinary tract infection due to extended-spectrum beta lactamase (ESBL)-producing Klebsiella ICD-10-CM: N39.0, B96.89  ICD-9-CM: 599.0, 041.84  1/15/2022 Yes        Dementia without behavioral disturbance (HCC) (Chronic) ICD-10-CM: F03.90  ICD-9-CM: 294.20  1/15/2022 Yes        ANJANA (acute kidney injury) ([facility] [address] [facility] ICD-10-CM: N17.9  ICD-9-CM: 584.9  1/15/2022 Yes        Renal mass, right (Chronic) ICD-10-CM: N28.89  ICD-9-CM: 593.9  6/27/2019 Yes        Primary hypertension (Chronic) ICD-10-CM: I10  ICD-9-CM: 401.9  1/8/2019 Yes        Type 2 diabetes mellitus with hyperglycemia, with long-term current use of insulin (HCC) (Chronic) ICD-10-CM: E11.65, Z79.4  ICD-9-CM: 250.00, 790.29, V58.67  1/8/2019 Yes              Signed By: Hamzah Daniels MD     January 18, 2022

## 2022-01-18 NOTE — PROGRESS NOTES
Hospitalist Progress Note   Admit Date:  1/15/2022  4:39 PM   Name:  Luzma Nunez   Age:  80 y.o. Sex:  female  :  1940   MRN:  [id number]   Room:  [facility]/    Presenting Complaint: Urinary Pain    Reason(s) for Admission: UTI (urinary tract infection) [N39.0]     Hospital Course & Interval History:   81F PMHx advanced dementia, HTN who presented 1/15 to ED with her daughter after being told she needed IV antibiotics for a resistant UTI. Patient was seen at an outpatient clinic on 22, diagnosed with a UTI, and started on Macrobid. She was told today that the urine culture was growing a resistant bacteria which necessitates IV antibiotics. Urine culture was growing Klebsiella which is only sensitive to cefepime, zosyn, gentamycin. Hospitalist admited. Cefepime started. Noncontrast CT  with nonobstructing stones in left pole and right upper pole mass. Urology consulted. Premedicated CT renal mass pending. Subjective (22): No new acute physical complaints today. Consulting urology and premedicated for CT renal mass. Discussed plan with urology. Assessment & Plan:   * Urinary tract infection due to extended-spectrum beta lactamase (ESBL)-producing Klebsiella  Klebsiella resistant to PO antibiotics, ER started on cefepime, will continue as per culture results, appears this has the best NURY  - Not currently febrile, continue to monitor  - Monitor CBC as well, normal WBC currently  - cefepime EOT : VSS, BCx NGTD, continue cefepime    History of nephrolithotomy with removal of calculi  Daughter present today providing history of renal stones requiring intervention by urology.   CT  with nonobstructing stones in left pole, upper right pole cortical mass.  -Consult urology  -CT renal mass    : New consult placed, CT with premedication ordered    Dementia without behavioral disturbance ([facility] [address]  Oriented to person at baseline  -Donepezil, bupropion, alprazolam    1/18: Confabulates and appears appropriate but has clear memory deficits    Renal mass, right  Urology had prior plan for imaging of renal mass may 2021.  -CT renal mass protocol  -Premedicate due to contrast allergy    ANJANA (acute kidney injury) ([facility]  Resolved 1/16    Type 2 diabetes mellitus with hyperglycemia, with long-term current use of insulin (HCC)  - Home insulin  - Diabetic diet    Primary hypertension  Admission VSS  -Spironolactone, valsartan        Dispo/Discharge Planning:    Return to memory care    Diet:  ADULT DIET Regular; 4 carb choices (60 gm/meal)  DVT PPx: Enoxaparin  Code status: Full Code    Hospital Problems as of 1/18/2022 Date Reviewed: 1/16/2022          Codes Class Noted - Resolved POA    * (Principal) Urinary tract infection due to extended-spectrum beta lactamase (ESBL)-producing Klebsiella ICD-10-CM: N39.0, B96.89  ICD-9-CM: 599.0, 041.84  1/15/2022 - Present Yes        History of nephrolithotomy with removal of calculi ICD-10-CM: Z98.890, Z87.442  ICD-9-CM: V15.29  1/17/2022 - Present Yes        Dementia without behavioral disturbance (HCC) (Chronic) ICD-10-CM: F03.90  ICD-9-CM: 294.20  1/15/2022 - Present Yes        Renal mass, right (Chronic) ICD-10-CM: N28.89  ICD-9-CM: 593.9  6/27/2019 - Present Yes        ANJANA (acute kidney injury) ([facility] ICD-10-CM: N17.9  ICD-9-CM: 584.9  1/15/2022 - Present Yes        Primary hypertension (Chronic) ICD-10-CM: I10  ICD-9-CM: 401.9  1/8/2019 - Present Yes        Type 2 diabetes mellitus with hyperglycemia, with long-term current use of insulin (HCC) (Chronic) ICD-10-CM: E11.65, Z79.4  ICD-9-CM: 250.00, 790.29, V58.67  1/8/2019 - Present Yes              Objective:     Patient Vitals for the past 24 hrs:   Temp Pulse Resp BP SpO2   01/18/22 0730 97.9 °F (36.6 °C) 62 18 (!) 151/56 99 %   01/18/22 0335 98.4 °F (36.9 °C) 75 18 134/64 96 %   01/17/22 2241 98.3 °F (36.8 °C) 63 18 (!) 154/64 95 %   01/17/22 1946 98.4 °F (36.9 °C) 68 18 (!) 156/62 95 %   01/17/22 1520 98.2 °F (36.8 °C) 65 18 (!) 154/68 96 %   01/17/22 1120 97.7 °F (36.5 °C) 70 18 (!) 175/50 98 %     Oxygen Therapy  O2 Sat (%): 99 % (01/18/22 0730)  O2 Device: None (Room air) (01/18/22 0740)    Estimated body mass index is 21.3 kg/m² as calculated from the following:    Height as of this encounter: 5' 5\" (1.651 m). Weight as of this encounter: 58.1 kg (128 lb). Intake/Output Summary (Last 24 hours) at 1/18/2022 1116  Last data filed at 1/18/2022 0657  Gross per 24 hour   Intake 2183 ml   Output 1500 ml   Net 683 ml       Blood pressure (!) 151/56, pulse 62, temperature 97.9 °F (36.6 °C), resp. rate 18, height 5' 5\" (1.651 m), weight 58.1 kg (128 lb), SpO2 99 %. Physical Exam  Vitals and nursing note reviewed. Constitutional:       General: She is not in acute distress. Appearance: Normal appearance. Comments: Affable   HENT:      Head: Normocephalic. Eyes:      Extraocular Movements: Extraocular movements intact. Cardiovascular:      Rate and Rhythm: Normal rate. Pulses:           Radial pulses are 2+ on the left side. Pulmonary:      Effort: Pulmonary effort is normal. No respiratory distress. Musculoskeletal:         General: No deformity. Cervical back: No rigidity. Skin:     General: Skin is warm and dry. Neurological:      General: No focal deficit present. Mental Status: She is alert. She is disoriented. Psychiatric:         Mood and Affect: Mood normal.         Behavior: Behavior normal.         Cognition and Memory: She exhibits impaired recent memory. She does not exhibit impaired remote memory.          I have reviewed ordered lab tests and independently visualized imaging below:    Recent Labs:  Recent Results (from the past 48 hour(s))   GLUCOSE, POC    Collection Time: 01/16/22  4:07 PM   Result Value Ref Range    Glucose (POC) 208 (H) 65 - 100 mg/dL    Performed by [address] [place] [address], [facility]    Collection Time: 01/17/22  6:29 AM   Result Value Ref Range    Sodium 138 136 - 145 mmol/L    Potassium 4.2 3.5 - 5.1 mmol/L    Chloride 107 98 - 107 mmol/L    CO2 24 21 - 32 mmol/L    Anion gap 7 7 - 16 mmol/L    Glucose 203 (H) 65 - 100 mg/dL    BUN 17 8 - 23 MG/DL    Creatinine 0.96 0.6 - 1.0 MG/DL    GFR est AA >60 >60 ml/min/1.73m2    GFR est non-AA 60 (L) >60 ml/min/1.73m2    Calcium 9.8 8.3 - 10.4 MG/DL   CBC W/O DIFF    Collection Time: 01/17/22  6:29 AM   Result Value Ref Range    WBC 5.4 4.3 - 11.1 K/uL    RBC 3.93 (L) 4.05 - 5.2 M/uL    HGB 11.9 11.7 - 15.4 g/dL    HCT 35.9 35.8 - 46.3 %    MCV 91.3 79.6 - 97.8 FL    MCH 30.3 26.1 - 32.9 PG    MCHC 33.1 31.4 - 35.0 g/dL    RDW 12.7 11.9 - 14.6 %    PLATELET 683 487 - 050 K/uL    MPV 11.2 9.4 - 12.3 FL    ABSOLUTE NRBC 0.00 0.0 - 0.2 K/uL   GLUCOSE, POC    Collection Time: 01/17/22  4:16 PM   Result Value Ref Range    Glucose (POC) 197 (H) 65 - 100 mg/dL    Performed by [facility].     CBC W/O DIFF    Collection Time: 01/18/22  5:39 AM   Result Value Ref Range    WBC 4.3 4.3 - 11.1 K/uL    RBC 3.80 (L) 4.05 - 5.2 M/uL    HGB 11.5 (L) 11.7 - 15.4 g/dL    HCT 34.9 (L) 35.8 - 46.3 %    MCV 91.8 79.6 - 97.8 FL    MCH 30.3 26.1 - 32.9 PG    MCHC 33.0 31.4 - 35.0 g/dL    RDW 12.6 11.9 - 14.6 %    PLATELET 524 647 - 343 K/uL    MPV 11.6 9.4 - 12.3 FL    ABSOLUTE NRBC 0.00 0.0 - 0.2 K/uL   METABOLIC PANEL, BASIC    Collection Time: 01/18/22  5:39 AM   Result Value Ref Range    Sodium 140 136 - 145 mmol/L    Potassium 3.4 (L) 3.5 - 5.1 mmol/L    Chloride 108 (H) 98 - 107 mmol/L    CO2 26 21 - 32 mmol/L    Anion gap 6 (L) 7 - 16 mmol/L    Glucose 181 (H) 65 - 100 mg/dL    BUN 18 8 - 23 MG/DL    Creatinine 0.95 0.6 - 1.0 MG/DL    GFR est AA >60 >60 ml/min/1.73m2    GFR est non-AA >60 >60 ml/min/1.73m2    Calcium 9.8 8.3 - 10.4 MG/DL       All Micro Results     Procedure Component Value Units Date/Time    CULTURE, BLOOD [[id number]] Collected: 01/15/22 9676    Order Status: Completed Specimen: Blood Updated: 01/18/22 0931     Special Requests: --        RIGHT  FOREARM       Culture result: NO GROWTH 3 DAYS       CULTURE, BLOOD [[id number]] Collected: 01/15/22 1741    Order Status: Completed Specimen: Blood Updated: 01/18/22 0931     Special Requests: --        LEFT  Antecubital       Culture result: NO GROWTH 3 DAYS       COVID-19 RAPID TEST [[id number]] Collected: 01/15/22 1913    Order Status: Completed Specimen: Nasopharyngeal Updated: 01/15/22 1945     Specimen source NASAL SWAB        COVID-19 rapid test Not detected        Comment:      The specimen is NEGATIVE for SARS-CoV-2, the novel coronavirus associated with COVID-19. A negative result does not rule out COVID-19. This test has been authorized by the FDA under an Emergency Use Authorization (EUA) for use by authorized laboratories. Fact sheet for Healthcare Providers: Tagent.co.nz  Fact sheet for Patients: Tagent.co.nz       Methodology: Isothermal Nucleic Acid Amplification               Other Studies:  CT ABD/PEL FOR RENAL STONE    Result Date: 1/17/2022  CT ABDOMEN AND PELVIS WITHOUT CONTRAST 1/17/2022 2:09 PM History:   ES IP, hx of urinary tract infections, CT UROGRAM. Technique: Noncontrast  axial images were obtained through the abdomen and pelvis per the renal stone protocol. Coronal reformatted images were generated. Dose reduction techniques were used such as automated exposure control, adjustment of the MA or KV according to patient size, and iterative reconstruction. Comparison:  None Findings: Included portions of the lung bases are clear. ABDOMEN:    There are multiple nonobstructing stones in lower pole likely consistent with the left kidney. There are no ureteral calculi and there is no hydronephrosis. Multiple left-sided renal cysts are present.  In addition, there is an incompletely characterized mass in the upper pole of the right renal cortex that measures 2.7 cm in diameter (image 43). This was characterized on the prior renal mass protocol CT October 19, 2020 and was suspicious for a renal tumor. Evaluation of the abdominal viscera is limited without IV contrast. Cholecystectomy clips are present. The unenhanced appearance of the gallbladder, liver, pancreas, spleen and adrenal glands is normal. The appendix is normal in appearance. PELVIS: Changes of a hysterectomy are present. There are a few calcified phleboliths within the pelvis. The bladder is normal appearance. There is no free pelvic fluid. Bone windows demonstrated no aggressive osseous lesions. 1. Nonobstructing lower pole left renal collecting system stones. 2. Upper pole right renal cortical mass, likely a solid renal neoplasm. Urological follow-up is recommended along with follow-up dedicated imaging evaluation with a renal mass protocol CT or MRI.  589       Current Meds:  Current Facility-Administered Medications   Medication Dose Route Frequency    potassium chloride (K-DUR, KLOR-CON M20) SR tablet 40 mEq  40 mEq Oral BID    methylPREDNISolone (PF) (SOLU-MEDROL) injection 40 mg  40 mg IntraVENous ONCE    Followed by   [employer] methylPREDNISolone (PF) (SOLU-MEDROL) injection 40 mg  40 mg IntraVENous ONCE    Followed by   [employer] [employer] methylPREDNISolone (PF) (SOLU-MEDROL) injection 40 mg  40 mg IntraVENous ONCE    [START ON 1/19/2022] diphenhydrAMINE (BENADRYL) injection 25 mg  25 mg IntraVENous ONCE    ALPRAZolam (XANAX) tablet 0.25 mg  0.25 mg Oral TID PRN    buPROPion SR (WELLBUTRIN SR) tablet 150 mg  150 mg Oral BID    atorvastatin (LIPITOR) tablet 40 mg  40 mg Oral QHS    valsartan (DIOVAN) tablet 320 mg  320 mg Oral DAILY    spironolactone (ALDACTONE) tablet 25 mg  25 mg Oral DAILY    ALPRAZolam (XANAX) tablet 0.25 mg  0.25 mg Oral QHS    donepeziL (ARICEPT) tablet 10 mg  10 mg Oral QHS    sodium chloride (NS) flush 5-40 mL  5-40 mL IntraVENous Q8H    sodium chloride (NS) flush 5-40 mL  5-40 mL IntraVENous PRN    acetaminophen (TYLENOL) tablet 650 mg  650 mg Oral Q6H PRN    Or    acetaminophen (TYLENOL) suppository 650 mg  650 mg Rectal Q6H PRN    polyethylene glycol (MIRALAX) packet 17 g  17 g Oral DAILY PRN    ondansetron (ZOFRAN ODT) tablet 4 mg  4 mg Oral Q8H PRN    Or    ondansetron (ZOFRAN) injection 4 mg  4 mg IntraVENous Q6H PRN    enoxaparin (LOVENOX) injection 40 mg  40 mg SubCUTAneous DAILY    lactated Ringers infusion  75 mL/hr IntraVENous CONTINUOUS    cefepime (MAXIPIME) 1 g in 0.9% sodium chloride (MBP/ADV) 50 mL MBP  1 g IntraVENous Q24H    insulin NPH (NOVOLIN N, HUMULIN N) injection 7 Units  7 Units SubCUTAneous ACB&D    And    insulin regular (NOVOLIN R, HUMULIN R) injection 3 Units  3 Units SubCUTAneous ACB&D       Signed:  Krystal Truong MD

## 2022-01-18 NOTE — CONSULTS
Urology Consult    Patient: Elverna Sandifer MRN: [id number]  SSN: [id number]    YOB: 1940  Age: 80 y.o. Sex: female      Subjective:      Elverna Sandifer is a 80 y.o. female with hx of RCC of R kidney s/p partial nephrectomy, renal stones, advanced dementia, HTN who presented 1/15 to ED with her daughter after being told she needed IV antibiotics for a resistant UTI. Patient was seen at an outpatient clinic on 1/11/22, diagnosed with a UTI, and started on Macrobid. She was told today that the urine culture was growing a resistant bacteria which necessitates IV antibiotics. Urine culture was growing Klebsiella which is only sensitive to cefepime, zosyn, gentamycin. Hospitalist admitted and pt on cefepime. CT AP without contrast shows:    IMPRESSION     1. Nonobstructing lower pole left renal collecting system stones. 2. Upper pole right renal cortical mass, likely a solid renal neoplasm. Urological follow-up is recommended along with follow-up dedicated imaging  evaluation with a renal mass protocol CT or MRI. We are consulted for renal mass and non obstructing renal stones with UTI. Pt known to Dr. Joey Albarado. She has hx of right renal mass, kidney stones and frequent UTI. She had right hand assisted laparoscopic nephrectomy on 6/27/19. Path- RCC. CT w/wo on 10/19/20 obtained. Per office notes:    CT shows enhancing 2.5 cm right upper pole mass, 8 mm right lower pole mass. Also stones in lower left kidney with lower calyceal obstruction. Pt has been stable but having some UTI. I discussed the small possibility of developing metastatic RCC. Options of close surveillance, percutaneous cryoablation or surgery were discussed. Cryo may be a good option. KUB today shows 19 mm stone in left lower calyx. She is currently on abx for recent UTI, was given abc at [facility]. She has hives when she has IV contrast. Does OK with steroid prep.   Will follow right renal mass for now. Possible perc cryoablation in the future. UTI's may be related to the left renal stones. Consider CT urogram in future, especially if she has continued UTI. Today, she c/o dysuria. CT ordered last year, per note pt was called and voice mail left. She did not have CT. Past Medical History:   Diagnosis Date    Anxiety     Diabetes ([address]     Type 2- 140-160 avg- oral and insulin- hypo below 60 A1C 6.2 1/9/2019    Diarrhea 1/8/2019    Encephalopathy 1/23/2019    Endocrine disease     Hypertension     Hyponatremia 1/8/2019    Kidney stones     Menopause     Metabolic encephalopathy 9/10/7900    Renal mass      Past Surgical History:   Procedure Laterality Date    HX HYSTERECTOMY      HX OOPHORECTOMY      TX LAP,CHOLECYSTECTOMY      TX REMOVAL OF KIDNEY STONE        Family History   Problem Relation Age of Onset    Thyroid Disease Mother     Hypertension Mother     Diabetes Brother     Breast Cancer Neg Hx      Social History     Tobacco Use    Smoking status: Never Smoker    Smokeless tobacco: Never Used   Substance Use Topics    Alcohol use: No      Prior to Admission medications    Medication Sig Start Date End Date Taking? Authorizing Provider   donepeziL (ARICEPT) 10 mg tablet Take 1 Tab by mouth nightly. 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 Yes Provider, Historical   ALPRAZolam (XANAX) 0.25 mg tablet Take 0.25 mg by mouth nightly. Yes Provider, Historical   valsartan (DIOVAN) 320 mg tablet Take 320 mg by mouth daily. Yes Provider, Historical   buPROPion XL (WELLBUTRIN XL) 300 mg XL tablet Take 300 mg by mouth every morning. Yes Provider, Historical   atorvastatin (LIPITOR) 40 mg tablet Take 40 mg by mouth nightly.    Yes Provider, Historical   insulin NPH/insulin regular (HUMULIN 70/30 U-100 INSULIN) 100 unit/mL (70-30) injection 10 Units by SubCUTAneous route two (2) times a day. 2/1/19  Yes Other, MD Sanaz   diphenhydrAMINE (BenadryL) 25 mg capsule Take both tabs 1 hour prior to CT scan  Patient not taking: Reported on 1/15/2022 10/5/20   Herlinda Godinez MD        Allergies   Allergen Reactions    Codeine Other (comments)    Contrast Agent [Iodine] Hives     Needs allergy protocol prior to contrast    Pcn [Penicillins] Unknown (comments)       Review of Systems:  A comprehensive review of systems was negative except for that written in the History of Present Illness. Objective:     Vitals:    01/17/22 2242 01/18/22 0335 01/18/22 0730 01/18/22 1125   BP:  134/64 (!) 151/56 (!) 158/56   Pulse:  75 62 64   Resp:  18 18 18   Temp:  98.4 °F (36.9 °C) 97.9 °F (36.6 °C) 98 °F (36.7 °C)   SpO2:  96% 99% 100%   Weight: 128 lb (58.1 kg)      Height:                Assessment:     Hospital Problems  Date Reviewed: 1/16/2022            Codes Class Noted POA    History of nephrolithotomy with removal of calculi ICD-10-CM: Z98.890, Z87.442  ICD-9-CM: V15.29  1/17/2022 Yes        * (Principal) Urinary tract infection due to extended-spectrum beta lactamase (ESBL)-producing Klebsiella ICD-10-CM: N39.0, B96.89  ICD-9-CM: 599.0, 041.84  1/15/2022 Yes        Dementia without behavioral disturbance (HCC) (Chronic) ICD-10-CM: F03.90  ICD-9-CM: 294.20  1/15/2022 Yes        ANJANA (acute kidney injury) ([facility] ICD-10-CM: N17.9  ICD-9-CM: 584.9  1/15/2022 Yes        Renal mass, right (Chronic) ICD-10-CM: N28.89  ICD-9-CM: 593.9  6/27/2019 Yes        Primary hypertension (Chronic) ICD-10-CM: I10  ICD-9-CM: 401.9  1/8/2019 Yes        Type 2 diabetes mellitus with hyperglycemia, with long-term current use of insulin (HCC) (Chronic) ICD-10-CM: E11.65, Z79.4  ICD-9-CM: 250.00, 790.29, V58.67  1/8/2019 Yes              Hx R RCC with partial R nephrectomy, admitted for txt of resistant UTI, CT shows  R renal mass and left non obstructing renal stones with recurrent UTIs.      Plan:     Obtain renal mass CT w/wo IV contrast.  Spoke with hospitalist regarding pts known allergy to IV contrast.  Per hospitalist she can be premedicated. He is placing orders. We will await CT results for further plans. She will likely need cryoablation of R renal mass vs nephrectomy and she will need surgical txt of her left renal stones. Continue txt for UTI and follow up cultures. Patient chart reviewed in collaboration with Dr. Nishi King. Plan as per Dr. Adilene Soler. Thank you for the opportunity to assist in the care of this patient. Signed By: Anita Herrera NP     January 18, 2022    CT w/wo shows 2.5 cm enhancing right renal mass, no change since 2020. Nonobstructing left renal stones. Will arrange follow up with me in 3 weeks. I have reviewed the above note and examined the patient. I agree with the exam, assessment and plan.     Mariela Persaud MD

## 2022-01-18 NOTE — PROGRESS NOTES
Interdisciplinary team meeting with Dr. Greta Gresham, CM, nursing, PT and dietary for plan of care. Patient to have antibiotics through 1/20 then should be able to d/c. CM called [facility] on [place] [phone] [facility] and left voice message for Yvrose to see if they need COVID test, need to evaluate patient or what information they need from us for patient to return. PT/OT has signed off. Let [facility] know anticipate d/c Thursday or Friday. Left CM number for f/u. CM following. Addendum: Received call from [address] [place] [address],[address] at [address] and they are able to accept patient back as long as I ADL's and will not be on sliding scale insulin. She will need a COVID test 24 hours prior to coming and rapid is okay.

## 2022-01-18 NOTE — PROGRESS NOTES
END OF SHIFT NOTE:    Intake/Output  01/17 1901 - 01/18 0700  In: 921 [P.O.:240; I.V.:681]  Out: 600 [Urine:600]   Voiding: YES  Catheter: NO  Drain:              Stool:  0 occurrences. Stool Assessment  Stool Appearance: Loose (01/17/22 0710)    Emesis:  0 occurrences. VITAL SIGNS  Patient Vitals for the past 12 hrs:   Temp Pulse Resp BP SpO2   01/18/22 0335 98.4 °F (36.9 °C) 75 18 134/64 96 %   01/17/22 2241 98.3 °F (36.8 °C) 63 18 (!) 154/64 95 %   01/17/22 1946 98.4 °F (36.9 °C) 68 18 (!) 156/62 95 %       Pain Assessment  Pain 1  Pain Scale 1: Numeric (0 - 10) (01/18/22 0135)  Pain Intensity 1: 0 (01/18/22 0135)  Patient Stated Pain Goal: 0 (01/18/22 0135)  Pain Reassessment 1: Patient resting w/respiratory rate greater than 10 (01/17/22 1203)  Pain Location 1: Pelvis (01/15/22 1513)    Ambulating  Yes    Additional Information:   Pleasantly confused;wanders. Redirected & reoriented prn. IV hydration continues. Slept some during the night. Shift report given to oncoming nurse Katheryne Dakin  at the bedside.     Comfort Blanc RN

## 2022-01-18 NOTE — PROGRESS NOTES
Ms Tyshawn Hutchinson will be seen by Palliative Care on 1/19/22. We apologize for the delay, which is weather-related.

## 2022-01-18 NOTE — ASSESSMENT & PLAN NOTE
Urology had prior plan for imaging of renal mass may 2021.  -CT renal mass protocol  -Premedicate due to contrast allergy

## 2022-01-19 ENCOUNTER — APPOINTMENT (OUTPATIENT)
Dept: CT IMAGING | Age: 82
DRG: 689 | End: 2022-01-19
Attending: FAMILY MEDICINE
Payer: MEDICARE

## 2022-01-19 LAB
ANION GAP SERPL CALC-SCNC: 7 MMOL/L (ref 7–16)
BUN SERPL-MCNC: 26 MG/DL (ref 8–23)
CALCIUM SERPL-MCNC: 10.1 MG/DL (ref 8.3–10.4)
CHLORIDE SERPL-SCNC: 104 MMOL/L (ref 98–107)
CO2 SERPL-SCNC: 24 MMOL/L (ref 21–32)
COVID-19 RAPID TEST, COVR: NOT DETECTED
CREAT SERPL-MCNC: 1.21 MG/DL (ref 0.6–1)
GLUCOSE BLD STRIP.AUTO-MCNC: 202 MG/DL (ref 65–100)
GLUCOSE BLD STRIP.AUTO-MCNC: 304 MG/DL (ref 65–100)
GLUCOSE BLD STRIP.AUTO-MCNC: 440 MG/DL (ref 65–100)
GLUCOSE SERPL-MCNC: 339 MG/DL (ref 65–100)
POTASSIUM SERPL-SCNC: 4.7 MMOL/L (ref 3.5–5.1)
SERVICE CMNT-IMP: ABNORMAL
SODIUM SERPL-SCNC: 135 MMOL/L (ref 136–145)
SOURCE, COVRS: NORMAL

## 2022-01-19 PROCEDURE — 74011000636 HC RX REV CODE- 636: Performed by: FAMILY MEDICINE

## 2022-01-19 PROCEDURE — 65270000029 HC RM PRIVATE

## 2022-01-19 PROCEDURE — 74011250636 HC RX REV CODE- 250/636: Performed by: FAMILY MEDICINE

## 2022-01-19 PROCEDURE — 74011636637 HC RX REV CODE- 636/637: Performed by: FAMILY MEDICINE

## 2022-01-19 PROCEDURE — 74011000250 HC RX REV CODE- 250: Performed by: FAMILY MEDICINE

## 2022-01-19 PROCEDURE — 80048 BASIC METABOLIC PNL TOTAL CA: CPT

## 2022-01-19 PROCEDURE — 74011636637 HC RX REV CODE- 636/637: Performed by: INTERNAL MEDICINE

## 2022-01-19 PROCEDURE — 74170 CT ABD WO CNTRST FLWD CNTRST: CPT

## 2022-01-19 PROCEDURE — 36415 COLL VENOUS BLD VENIPUNCTURE: CPT

## 2022-01-19 PROCEDURE — 74011000258 HC RX REV CODE- 258: Performed by: FAMILY MEDICINE

## 2022-01-19 PROCEDURE — 74011250637 HC RX REV CODE- 250/637: Performed by: FAMILY MEDICINE

## 2022-01-19 PROCEDURE — 82962 GLUCOSE BLOOD TEST: CPT

## 2022-01-19 PROCEDURE — 87635 SARS-COV-2 COVID-19 AMP PRB: CPT

## 2022-01-19 PROCEDURE — 97535 SELF CARE MNGMENT TRAINING: CPT

## 2022-01-19 RX ORDER — INSULIN LISPRO 100 [IU]/ML
INJECTION, SOLUTION INTRAVENOUS; SUBCUTANEOUS
Status: DISCONTINUED | OUTPATIENT
Start: 2022-01-19 | End: 2022-01-20 | Stop reason: HOSPADM

## 2022-01-19 RX ORDER — INSULIN LISPRO 100 [IU]/ML
INJECTION, SOLUTION INTRAVENOUS; SUBCUTANEOUS
Status: DISCONTINUED | OUTPATIENT
Start: 2022-01-19 | End: 2022-01-19

## 2022-01-19 RX ORDER — SODIUM CHLORIDE 0.9 % (FLUSH) 0.9 %
10 SYRINGE (ML) INJECTION
Status: COMPLETED | OUTPATIENT
Start: 2022-01-19 | End: 2022-01-19

## 2022-01-19 RX ADMIN — BUPROPION HYDROCHLORIDE 150 MG: 150 TABLET, EXTENDED RELEASE ORAL at 08:28

## 2022-01-19 RX ADMIN — SPIRONOLACTONE 25 MG: 25 TABLET ORAL at 08:29

## 2022-01-19 RX ADMIN — Medication 4 UNITS: at 21:59

## 2022-01-19 RX ADMIN — ALPRAZOLAM 0.25 MG: 0.5 TABLET ORAL at 21:59

## 2022-01-19 RX ADMIN — ENOXAPARIN SODIUM 40 MG: 100 INJECTION SUBCUTANEOUS at 08:28

## 2022-01-19 RX ADMIN — DONEPEZIL HYDROCHLORIDE 10 MG: 5 TABLET, FILM COATED ORAL at 21:59

## 2022-01-19 RX ADMIN — Medication 3 UNITS: at 07:30

## 2022-01-19 RX ADMIN — Medication 10 ML: at 01:23

## 2022-01-19 RX ADMIN — Medication 7 UNITS: at 07:30

## 2022-01-19 RX ADMIN — Medication 10 UNITS: at 18:00

## 2022-01-19 RX ADMIN — IOPAMIDOL 100 ML: 755 INJECTION, SOLUTION INTRAVENOUS at 01:23

## 2022-01-19 RX ADMIN — BUPROPION HYDROCHLORIDE 150 MG: 150 TABLET, EXTENDED RELEASE ORAL at 21:59

## 2022-01-19 RX ADMIN — SODIUM CHLORIDE 100 ML: 9 INJECTION, SOLUTION INTRAVENOUS at 01:23

## 2022-01-19 RX ADMIN — SODIUM CHLORIDE, PRESERVATIVE FREE 10 ML: 5 INJECTION INTRAVENOUS at 22:00

## 2022-01-19 RX ADMIN — SODIUM CHLORIDE, PRESERVATIVE FREE 10 ML: 5 INJECTION INTRAVENOUS at 05:07

## 2022-01-19 RX ADMIN — VALSARTAN 320 MG: 320 TABLET, FILM COATED ORAL at 08:28

## 2022-01-19 RX ADMIN — SODIUM CHLORIDE, SODIUM LACTATE, POTASSIUM CHLORIDE, AND CALCIUM CHLORIDE 75 ML/HR: 600; 310; 30; 20 INJECTION, SOLUTION INTRAVENOUS at 01:06

## 2022-01-19 RX ADMIN — CEFEPIME HYDROCHLORIDE 1 G: 1 INJECTION, POWDER, FOR SOLUTION INTRAMUSCULAR; INTRAVENOUS at 17:34

## 2022-01-19 RX ADMIN — ATORVASTATIN CALCIUM 40 MG: 40 TABLET, FILM COATED ORAL at 21:59

## 2022-01-19 RX ADMIN — Medication 7 UNITS: at 16:30

## 2022-01-19 NOTE — PROGRESS NOTES
Problem: Self Care Impaired (Adult and Pediatric)  Goal: *Acute Goals and Plan of Care (Insert Text)  Outcome: DISCHARGE GOALS  1. Patient will complete lower body bathing and dressing INDEPENDENTLY. 2. Patient will complete toileting INDEPENDENTLY. 3. Patient will complete grooming ADL INDEPENDENTLY. 4. Patient will tolerate 25 minutes of OT treatment with 1-2 rest breaks to increase activity tolerance for ADLs. 5. Patient will complete functional transfers INDEPENDENTLY  6. Patient will tolerate 10 minutes BUE exercises to increase strength for safe, functional transfers. Timeframe: 7 visits   OCCUPATIONAL THERAPY: Daily Note and AM 1/19/2022  INPATIENT: OT Visit Days: 2  Payor: Dimitris Rodriguez / Plan: [employer] / Product Type: [employer] Medicare /      NAME/AGE/GENDER: Babar Taylor is a 80 y.o. female   PRIMARY DIAGNOSIS:  UTI (urinary tract infection) [N39.0] Urinary tract infection due to extended-spectrum beta lactamase (ESBL)-producing Klebsiella Urinary tract infection due to extended-spectrum beta lactamase (ESBL)-producing Klebsiella       ICD-10: Treatment Diagnosis:    Generalized Muscle Weakness (M62.81)  Difficulty in walking, Not elsewhere classified (R26.2)  Other abnormalities of gait and mobility (R26.89)   Precautions/Allergies:     Codeine, Contrast agent [iodine], and Pcn [penicillins]      ASSESSMENT:     Ms. Caryle Cao is a 79 y/o female presents with UTI. Pt admitted from [facility] and is normally independent with all ADLs. Pt AAO x1 today and with hx of dementia. Daughter present to confirm and provide PLOF. Today pt presents with decreased activity tolerance and balance impacting ADLs. Pt overall supervision for functional transfers, but SBA-CGA for dynamic standing balance during ADLs. Pt is impulsive at times and with LOB when attempting LE dressing sitting/standing from chair and when making turns ambulating.  Pt requires frequent redirection and cues for attention to task due to hx of dementia. Pt is currently functioning close to baseline and would benefit from 1-2 more skilled OT treatments to address OT goals and plan of care. No further needs at d/c.     1/19/22 905am Patient supine in bed independent with bed mobility. She changed gown and donned socks with supervision. She ambulated to the bathroom with SBA she had an IV. She stood at the sink to brush teeth and comb hair. She returned to EOB and donned shoes and changed pillow cases. She transferred to recliner with supervision. She has a tab alert in place and reported she is feeling much better. She lives at the [facility] and plans to return there. Will continue with OT poc. This section established at most recent assessment   PROBLEM LIST (Impairments causing functional limitations):  Decreased Strength  Decreased ADL/Functional Activities  Decreased Transfer Abilities  Decreased Balance   INTERVENTIONS PLANNED: (Benefits and precautions of occupational therapy have been discussed with the patient.)  Activities of daily living training  Balance training  Neuromuscular re-eduation  Therapeutic activity  Therapeutic exercise     TREATMENT PLAN: Frequency/Duration: Follow patient 3x/week to address above goals. Rehabilitation Potential For Stated Goals: Excellent     REHAB RECOMMENDATIONS (at time of discharge pending progress):    Placement: It is my opinion, based on this patient's performance to date, that Ms. Saqib Rasmussen may benefit from being discharged with NO further skilled therapy due to the high likelihood of returning to baseline. Equipment:   None at this time pt has SC at home              [facility]:   History of Present Injury/Illness (Reason for Referral):   Admitted with UTI  Past Medical History/Comorbidities:   Ms. Saqib Rasmussen  has a past medical history of Anxiety, Diabetes ([facility] [address] [address] Diarrhea (1/8/2019), Encephalopathy (1/23/2019), Endocrine disease, Hypertension, Hyponatremia (1/8/2019), Kidney stones, Menopause, Metabolic encephalopathy (1/60/8709), and Renal mass. Ms. Katie Lemos  has a past surgical history that includes hx hysterectomy; hx oophorectomy; pr lap,cholecystectomy; and pr removal of kidney stone. Social History/Living Environment:   Home Environment: [address] [place] [address] Road Name: Vivian Pollack  # Steps to Enter: 0  One/Two Story Residence: One story  Living Alone: No  Support Systems: Assisted Living (Patient lives at the [facility] on [facility] [phone])  Patient Expects to be Discharged to[de-identified] Assisted living  Current DME Used/Available at Home:  (none)  Tub or Shower Type: Shower  Prior Level of Function/Work/Activity:  Bathing: mod I with SC  Dressing, grooming, toileting: independent     Number of Personal Factors/Comorbidities that affect the Plan of Care: Brief history (0):  LOW COMPLEXITY   ASSESSMENT OF OCCUPATIONAL PERFORMANCE[de-identified]   Activities of Daily Living:   Basic ADLs (From Assessment) Complex ADLs (From Assessment)   Oral Facial Hygiene/Grooming: Stand-by assistance (decreased dynamic standing)  Lower Body Dressing: Stand-by assistance (decreased dynamic standing)     Grooming/Bathing/Dressing Activities of Daily Living   Grooming  Grooming Assistance: Supervision  Position Performed: Standing  Brushing Teeth: Supervision  Brushing/Combing Hair: Supervision Cognitive Retraining  Safety/Judgement: Awareness of environment; Fall prevention               Upper Body Dressing Assistance  Hospital Gown: Supervision Functional Transfers  Bathroom Mobility: Stand-by assistance   Lower Body Dressing Assistance  Socks: Supervision  Slip on Shoes with Back: Supervision Bed/Mat Mobility  Sit to Supine: Independent  Sit to Stand: Supervision  Stand to Sit: Supervision  Bed to Chair: Supervision  Scooting: Supervision     Most Recent Physical Functioning:   Gross Assessment:                  Posture:     Balance:  Sitting: Intact  Standing: Without support  Standing - Static: Good  Standing - Dynamic : Fair Bed Mobility:  Sit to Supine: Independent  Scooting: Supervision  Wheelchair Mobility:     Transfers:  Sit to Stand: Supervision  Stand to Sit: Supervision  Bed to Chair: Supervision            Patient Vitals for the past 6 hrs:   BP SpO2 Pulse   01/19/22 0804 (!) 118/95 95 % (!) 56       Mental Status  Neurologic State: Alert,Appropriate for age  Orientation Level: Disoriented to situation,Oriented to person,Oriented to place  Cognition: Follows commands,Memory loss  Perception: Appears intact  Perseveration: No perseveration noted  Safety/Judgement: Awareness of environment,Fall prevention                          Physical Skills Involved:  Balance  Activity Tolerance Cognitive Skills Affected (resulting in the inability to perform in a timely and safe manner):  Perception  Executive Function  Short Term Recall  Long Term Memory  Sustained Attention Psychosocial Skills Affected:  Habits/Routines  Environmental Adaptation  Social Interaction  Self-Awareness  Safety Awareness   Number of elements that affect the Plan of Care: 1-3:  LOW COMPLEXITY   CLINICAL DECISION MAKING:   [facility] AM-PAC 6 Clicks   Daily Activity Inpatient Short Form  How much help from another person does the patient currently need. .. Total A Lot A Little None   1. Putting on and taking off regular lower body clothing? [] 1   [] 2   [x] 3   [] 4   2. Bathing (including washing, rinsing, drying)? [] 1   [] 2   [x] 3   [] 4   3. Toileting, which includes using toilet, bedpan or urinal?   [] 1   [] 2   [x] 3   [] 4   4. Putting on and taking off regular upper body clothing? [] 1   [] 2   [x] 3   [] 4   5. Taking care of personal grooming such as brushing teeth? [] 1   [] 2   [x] 3   [] 4   6. Eating meals? [] 1   [] 2   [] 3   [x] 4   © 2007, Trustees of [facility], under license to [employer].  All rights reserved      Score: Initial: 19 Most Recent: X (Date: -- )    Interpretation of Tool:  Represents activities that are increasingly more difficult (i.e. Bed mobility, Transfers, Gait). Medical Necessity:     Patient is expected to demonstrate progress in dynamic balance and functional transfers to return to [facility] with no further therapy needs. Reason for Services/Other Comments:  Acute weakness and decreased balance during ADLs. Use of outcome tool(s) and clinical judgement create a POC that gives a: LOW COMPLEXITY         TREATMENT:   (In addition to Assessment/Re-Assessment sessions the following treatments were rendered)     Pre-treatment Symptoms/Complaints:    Pain: Initial:      Post Session:  0     Self Care: (25): Procedure(s) (per grid) utilized to improve and/or restore self-care/home management as related to dressing, grooming and functional transfers in prep for ADLs and ambulating household distances. Required minimal verbal, manual and tactile cueing to facilitate activities of daily living skills. Braces/Orthotics/Lines/Etc:   IV  O2 Device: None (Room air)  Treatment/Session Assessment:    Response to Treatment:  Good, in chair tab alert to in place  Interdisciplinary Collaboration:   Registered Nurse  After treatment position/precautions:   Up in chair  Bed alarm/tab alert on  Bed/Chair-wheels locked  Bed in low position  Call light within reach  RN notified   Compliance with Program/Exercises: Compliant all of the time. Recommendations/Intent for next treatment session: \"Next visit will focus on advancements to more challenging activities and reduction in assistance provided\".   Total Treatment Duration:25  OT Patient Time In/Time Out  Time In: 8954  Time Out: 91132 Espinoza Gómez OT

## 2022-01-19 NOTE — PROGRESS NOTES
END OF SHIFT NOTE:    Intake/Output  01/18 1901 - 01/19 0700  In: 8732 [P.O.:240; I.V.:1366]  Out: 600 [Urine:600]   Voiding: YES  Catheter: NO  Drain:              Stool:  0 occurrences. Stool Assessment  Stool Appearance: Watery (per pt) (01/18/22 0740)    Emesis:  0 occurrences. VITAL SIGNS  Patient Vitals for the past 12 hrs:   Temp Pulse Resp BP SpO2   01/18/22 2247 98.9 °F (37.2 °C) 62 18 138/69 98 %   01/18/22 1920 98.3 °F (36.8 °C) 69 18 (!) 148/56 97 %       Pain Assessment  Pain 1  Pain Scale 1: Numeric (0 - 10) (01/19/22 0130)  Pain Intensity 1: 0 (01/19/22 0130)  Patient Stated Pain Goal: 0 (01/19/22 0130)  Pain Reassessment 1: Patient resting w/respiratory rate greater than 10 (01/18/22 1400)  Pain Location 1: Pelvis (01/15/22 1513)    Ambulating  Yes    Additional Information:   Slept fairly well overnight. Pleasantly confused;constantly reoriented & informed on POC. CT abdomen of renal mass done. Plan for IV Maxipime for UTI til 1/20. Shift report given to oncoming nurse DENNIS He at the bedside.     Comfort Blanc RN

## 2022-01-19 NOTE — PROGRESS NOTES
Care Management Interventions  PCP Verified by CM: Yes  Palliative Care Criteria Met (RRAT>21 & CHF Dx)?: No  Transition of Care Consult (CM Consult): Assisted Living ([facility] on [address])  Discharge Durable Medical Equipment: No  Physical Therapy Consult: Yes  Occupational Therapy Consult: Yes  Speech Therapy Consult: No  Support Systems: Assisted Living (Patient lives at the [facility] on [address] [address] [phone])  The Plan for Transition of Care is Related to the Following Treatment Goals : Plans to return to [facility] on [address] [place] [address]  Discharge Location  Patient Expects to be Discharged to[de-identified] Assisted living  Read-Only, Retired: Discharge Placement: Assisted Living    Sw reviewed chart and talked with Md during care rounds. Pt lives at [facility], therapies have signed off as pt is doing well. Pt found to have a renal mass and Urology was consulted. No plans for intervention. Veronica asked Rn to do rapid COVID test in anticipation of her return to [facility] in am.  Need scripts for all NEW MEDS.    Patrick Pham

## 2022-01-19 NOTE — PROGRESS NOTES
Hx R RCC with partial R nephrectomy, admitted for txt of resistant UTI, CT shows  R renal mass and left non obstructing renal stones with recurrent UTIs. Renal mass CT recommended and it was obtained on 1/18 with findings:  IMPRESSION      1. An approximately 2.5 cm heterogeneously enhancing cortical mass at the upper  pole of the right kidney, suspicious for renal cell carcinoma. This is similar  to CT from October 19, 2020.     2. Nonobstructing left renal calculi. No hydronephrosis. Dr. Basim Holcomb has reviewed results. Pt's renal mass similar to images in 2020. Also with known renal stones on the L and with no hydronephrosis. He recommends continue txt for UTI and she will need to follow up with us. I called and spoke with daughter, Gaye Pena, who states her mother has dementia and they do NOT want to pursue any surgeries or treatment. Offered follow up appt in about 3 months to check in on pt and check her urine and readdress if needed. She agrees with follow up in 3 months, otherwise pt can follow up as needed. I have reviewed the above note and examined the patient. I agree with the exam, assessment and plan.     Malka Mendes MD

## 2022-01-19 NOTE — PROGRESS NOTES
Problem: Falls - Risk of  Goal: *Absence of Falls  Description: Document Sharyn Vieira Fall Risk and appropriate interventions in the flowsheet.   Outcome: Progressing Towards Goal  Note: Fall Risk Interventions:  Mobility Interventions: Communicate number of staff needed for ambulation/transfer    Mentation Interventions: Bed/chair exit alarm    Medication Interventions: Teach patient to arise slowly    Elimination Interventions: Call light in reach

## 2022-01-19 NOTE — PROGRESS NOTES
Hospitalist Progress Note   Admit Date:  1/15/2022  4:39 PM   Name:  Willie Neal   Age:  80 y.o. Sex:  female  :  1940   MRN:  [id number]   Room:  [facility]    Presenting Complaint: Urinary Pain    Reason(s) for Admission: UTI (urinary tract infection) [N39.0]     Hospital Course & Interval History:     Patient with past medical history of    Dementia   Hypertension     Patient presented to ED after being told that she needed IV antibiotic for her UTI. She was seen in clinic for UTI on 2022. She was diagnosed with UTI and had urine culture done. She was started on PO Macrobid. Her urine culture grew Klebsiella which was sensitive to Cefepime, Zosyn and Gentamicin. She is started on Cefepime. CT shows non-obstructing stones in left pole of kidney and right upper pole mass in kidney was found. Urology was consulted. Subjective (22):    22   No fever. No shaking. No chills. No chest pain. No shortness of breath. Assessment & Plan:     Principal Problem:    Urinary tract infection due to extended-spectrum beta lactamase (ESBL)-producing Klebsiella (1/15/2022)  Continue Cefepime   Monitor symptoms and response to treatment. Blood culture so far is negative. Active Problems:    History of nephrolithotomy with removal of calculi (2022)    Urology is consulted especially with right renal pole mass. Will follow their plan. Primary hypertension (2019)    Patient is on Varsartan, Spironolactone,         Type 2 diabetes mellitus with hyperglycemia, with long-term current use of insulin ([address] (2019)  Monitor blood sugar. Cover with insulin sliding scale accordingly. Renal mass, right (2019)  As above. Pending urology plan         Dementia without behavioral disturbance ([address] (1/15/2022)  Noted       ANJANA (acute kidney injury) ([address] (1/15/2022)    Renal function improves since admission with hydration.    Monitor renal function and intake and output. Avoid nephrotoxic agents. I have discussed the plan of care with patient. Dispo/Discharge Planning: To assisted living facility when ready. Diet:  ADULT DIET Regular; 4 carb choices (60 gm/meal)  DVT PPx: Enoxaparin SC   Code status: DNR    Hospital Problems as of 1/19/2022 Date Reviewed: 1/16/2022          Codes Class Noted - Resolved POA    History of nephrolithotomy with removal of calculi ICD-10-CM: Z98.890, Z87.442  ICD-9-CM: V15.29  1/17/2022 - Present Yes        * (Principal) Urinary tract infection due to extended-spectrum beta lactamase (ESBL)-producing Klebsiella ICD-10-CM: N39.0, B96.89  ICD-9-CM: 599.0, 041.84  1/15/2022 - Present Yes        Dementia without behavioral disturbance (HCC) (Chronic) ICD-10-CM: F03.90  ICD-9-CM: 294.20  1/15/2022 - Present Yes        ANJANA (acute kidney injury) ([facility] [facility] ICD-10-CM: N17.9  ICD-9-CM: 584.9  1/15/2022 - Present Yes        Renal mass, right (Chronic) ICD-10-CM: N28.89  ICD-9-CM: 593.9  6/27/2019 - Present Yes        Primary hypertension (Chronic) ICD-10-CM: I10  ICD-9-CM: 401.9  1/8/2019 - Present Yes        Type 2 diabetes mellitus with hyperglycemia, with long-term current use of insulin (HCC) (Chronic) ICD-10-CM: E11.65, Z79.4  ICD-9-CM: 250.00, 790.29, V58.67  1/8/2019 - Present Yes              Objective:     Patient Vitals for the past 24 hrs:   Temp Pulse Resp BP SpO2   01/19/22 0804 98.3 °F (36.8 °C) (!) 56 18 (!) 118/95 95 %   01/19/22 0228 98.4 °F (36.9 °C) 60 18 (!) 122/52 97 %   01/18/22 2247 98.9 °F (37.2 °C) 62 18 138/69 98 %   01/18/22 1920 98.3 °F (36.8 °C) 69 18 (!) 148/56 97 %   01/18/22 1530 98.4 °F (36.9 °C) 72 18 (!) 144/74 96 %     Oxygen Therapy  O2 Sat (%): 95 % (01/19/22 0804)  O2 Device: None (Room air) (01/19/22 0228)    Estimated body mass index is 21.3 kg/m² as calculated from the following:    Height as of this encounter: 5' 5\" (1.651 m).     Weight as of this encounter: 58.1 kg (128 lb).    Intake/Output Summary (Last 24 hours) at 1/19/2022 1207  Last data filed at 1/19/2022 0929  Gross per 24 hour   Intake 2111 ml   Output 1000 ml   Net 1111 ml         Physical Exam:     Blood pressure (!) 118/95, pulse (!) 56, temperature 98.3 °F (36.8 °C), resp. rate 18, height 5' 5\" (1.651 m), weight 58.1 kg (128 lb), SpO2 95 %. General:    Well nourished. No overt distress. Patient is resting quietly in bed. Head:  Normocephalic, atraumatic  Eyes:  Sclerae appear normal.  Pupils equally round. ENT:  Nares appear normal, no drainage. Moist oral mucosa  Neck:  No restricted ROM. Trachea midline   CV:   RRR. No m/r/g. No jugular venous distension. Lungs:   CTAB. No wheezing, rhonchi, or rales. Respirations even, unlabored  Abdomen: Bowel sounds present. Soft, nontender, nondistended. Extremities: No cyanosis or clubbing. No edema  Non-tender at costovertebral angles. Skin:     No rashes and normal coloration. Warm and dry. Neuro:  CN II-XII grossly intact. Sensation intact. A&Ox3  Psych:  Normal mood and affect. I have reviewed ordered lab tests and independently visualized imaging below:    Recent Labs:  Recent Results (from the past 48 hour(s))   GLUCOSE, POC    Collection Time: 01/17/22  4:16 PM   Result Value Ref Range    Glucose (POC) 197 (H) 65 - 100 mg/dL    Performed by Pita.     CBC W/O DIFF    Collection Time: 01/18/22  5:39 AM   Result Value Ref Range    WBC 4.3 4.3 - 11.1 K/uL    RBC 3.80 (L) 4.05 - 5.2 M/uL    HGB 11.5 (L) 11.7 - 15.4 g/dL    HCT 34.9 (L) 35.8 - 46.3 %    MCV 91.8 79.6 - 97.8 FL    MCH 30.3 26.1 - 32.9 PG    MCHC 33.0 31.4 - 35.0 g/dL    RDW 12.6 11.9 - 14.6 %    PLATELET 332 010 - 772 K/uL    MPV 11.6 9.4 - 12.3 FL    ABSOLUTE NRBC 0.00 0.0 - 0.2 K/uL   METABOLIC PANEL, BASIC    Collection Time: 01/18/22  5:39 AM   Result Value Ref Range    Sodium 140 136 - 145 mmol/L    Potassium 3.4 (L) 3.5 - 5.1 mmol/L    Chloride 108 (H) 98 - 107 mmol/L    CO2 26 21 - 32 mmol/L    Anion gap 6 (L) 7 - 16 mmol/L    Glucose 181 (H) 65 - 100 mg/dL    BUN 18 8 - 23 MG/DL    Creatinine 0.95 0.6 - 1.0 MG/DL    GFR est AA >60 >60 ml/min/1.73m2    GFR est non-AA >60 >60 ml/min/1.73m2    Calcium 9.8 8.3 - 10.4 MG/DL   GLUCOSE, POC    Collection Time: 01/18/22 10:48 PM   Result Value Ref Range    Glucose (POC) 302 (H) 65 - 100 mg/dL    Performed by JessicaaPCT    GLUCOSE, POC    Collection Time: 01/19/22  7:30 AM   Result Value Ref Range    Glucose (POC) 304 (H) 65 - 100 mg/dL    Performed by [email or web address]        All Micro Results     Procedure Component Value Units Date/Time    CULTURE, BLOOD [[id number]] Collected: 01/15/22 1741    Order Status: Completed Specimen: Blood Updated: 01/19/22 0735     Special Requests: --        LEFT  Antecubital       Culture result: NO GROWTH 4 DAYS       CULTURE, BLOOD [[id number]] Collected: 01/15/22 1741    Order Status: Completed Specimen: Blood Updated: 01/19/22 0735     Special Requests: --        RIGHT  FOREARM       Culture result: NO GROWTH 4 DAYS       COVID-19 RAPID TEST [[id number]] Collected: 01/15/22 1913    Order Status: Completed Specimen: Nasopharyngeal Updated: 01/15/22 1945     Specimen source NASAL SWAB        COVID-19 rapid test Not detected        Comment:      The specimen is NEGATIVE for SARS-CoV-2, the novel coronavirus associated with COVID-19. A negative result does not rule out COVID-19. This test has been authorized by the FDA under an Emergency Use Authorization (EUA) for use by authorized laboratories.         Fact sheet for Healthcare Providers: ConventionUpdate.co.nz  Fact sheet for Patients: ConventionUpdate.co.nz       Methodology: Isothermal Nucleic Acid Amplification               Other Studies:  CT ABD RENAL MASS    Result Date: 1/19/2022  Clinical history: Possible solid mass at the upper pole of the right kidney noted on the noncontrast CT from January 17, 2022. TECHNIQUE: Axial precontrast and axial delayed postcontrast images of the abdomen were obtained following the administration of 100 cc of IV contrast. Coronal reformats were obtained. CT dose reduction was achieved through use of a standardized protocol tailored for this examination and automatic exposure control for dose modulation. COMPARISON: CT dating back to June 2019. FINDINGS: There is ill-defined airspace density in the lingula, likely atelectasis or bronchopneumonia. Abdomen findings: There is a 2.5 cm heterogeneously enhancing solid mass at the upper pole of the right kidney. There are multiple left renal cysts, largest measuring up to 2.5 cm. Large nonobstructing calculi at the lower pole of the left kidney. There is no hydronephrosis. Micronodular contour of the liver, raising the question of cirrhosis. Gallbladder is surgically absent. The pancreas, spleen, and adrenal glands are unremarkable. Abdominal aorta is normal in course and caliber with prominent atherosclerotic calcifications. Stomach is normal in contour. Visualized small and large bowel loops are of normal in caliber. No evidence of bowel obstruction. No free air or free fluid in the abdomen. Surrounding bones are intact. 1. An approximately 2.5 cm heterogeneously enhancing cortical mass at the upper pole of the right kidney, suspicious for renal cell carcinoma. This is similar to CT from October 19, 2020. 2. Nonobstructing left renal calculi. No hydronephrosis.       Current Meds:  Current Facility-Administered Medications   Medication Dose Route Frequency    ALPRAZolam (XANAX) tablet 0.25 mg  0.25 mg Oral TID PRN    buPROPion SR (WELLBUTRIN SR) tablet 150 mg  150 mg Oral BID    atorvastatin (LIPITOR) tablet 40 mg  40 mg Oral QHS    valsartan (DIOVAN) tablet 320 mg  320 mg Oral DAILY    spironolactone (ALDACTONE) tablet 25 mg  25 mg Oral DAILY    ALPRAZolam (XANAX) tablet 0.25 mg  0.25 mg Oral QHS    donepeziL (ARICEPT) tablet 10 mg  10 mg Oral QHS    sodium chloride (NS) flush 5-40 mL  5-40 mL IntraVENous Q8H    sodium chloride (NS) flush 5-40 mL  5-40 mL IntraVENous PRN    acetaminophen (TYLENOL) tablet 650 mg  650 mg Oral Q6H PRN    Or    acetaminophen (TYLENOL) suppository 650 mg  650 mg Rectal Q6H PRN    polyethylene glycol (MIRALAX) packet 17 g  17 g Oral DAILY PRN    ondansetron (ZOFRAN ODT) tablet 4 mg  4 mg Oral Q8H PRN    Or    ondansetron (ZOFRAN) injection 4 mg  4 mg IntraVENous Q6H PRN    enoxaparin (LOVENOX) injection 40 mg  40 mg SubCUTAneous DAILY    lactated Ringers infusion  75 mL/hr IntraVENous CONTINUOUS    cefepime (MAXIPIME) 1 g in 0.9% sodium chloride (MBP/ADV) 50 mL MBP  1 g IntraVENous Q24H    insulin NPH (NOVOLIN N, HUMULIN N) injection 7 Units  7 Units SubCUTAneous ACB&D    And    insulin regular (NOVOLIN R, HUMULIN R) injection 3 Units  3 Units SubCUTAneous ACB&D       Signed:  Héctor Gan MD    Part of this note may have been written by using a voice dictation software. The note has been proof read but may still contain some grammatical/other typographical errors.

## 2022-01-20 VITALS
OXYGEN SATURATION: 97 % | RESPIRATION RATE: 18 BRPM | HEIGHT: 65 IN | TEMPERATURE: 98.3 F | HEART RATE: 77 BPM | BODY MASS INDEX: 21.33 KG/M2 | DIASTOLIC BLOOD PRESSURE: 60 MMHG | SYSTOLIC BLOOD PRESSURE: 143 MMHG | WEIGHT: 128 LBS

## 2022-01-20 LAB
ANION GAP SERPL CALC-SCNC: 6 MMOL/L (ref 7–16)
BACTERIA SPEC CULT: NORMAL
BACTERIA SPEC CULT: NORMAL
BUN SERPL-MCNC: 25 MG/DL (ref 8–23)
CALCIUM SERPL-MCNC: 9.6 MG/DL (ref 8.3–10.4)
CHLORIDE SERPL-SCNC: 109 MMOL/L (ref 98–107)
CO2 SERPL-SCNC: 24 MMOL/L (ref 21–32)
CREAT SERPL-MCNC: 0.96 MG/DL (ref 0.6–1)
GLUCOSE BLD STRIP.AUTO-MCNC: 314 MG/DL (ref 65–100)
GLUCOSE SERPL-MCNC: 199 MG/DL (ref 65–100)
POTASSIUM SERPL-SCNC: 4 MMOL/L (ref 3.5–5.1)
SERVICE CMNT-IMP: ABNORMAL
SERVICE CMNT-IMP: NORMAL
SERVICE CMNT-IMP: NORMAL
SODIUM SERPL-SCNC: 139 MMOL/L (ref 136–145)

## 2022-01-20 PROCEDURE — 74011250637 HC RX REV CODE- 250/637: Performed by: FAMILY MEDICINE

## 2022-01-20 PROCEDURE — 80048 BASIC METABOLIC PNL TOTAL CA: CPT

## 2022-01-20 PROCEDURE — 74011000250 HC RX REV CODE- 250: Performed by: FAMILY MEDICINE

## 2022-01-20 PROCEDURE — 74011636637 HC RX REV CODE- 636/637: Performed by: INTERNAL MEDICINE

## 2022-01-20 PROCEDURE — 74011250636 HC RX REV CODE- 250/636: Performed by: FAMILY MEDICINE

## 2022-01-20 PROCEDURE — 82962 GLUCOSE BLOOD TEST: CPT

## 2022-01-20 PROCEDURE — 74011636637 HC RX REV CODE- 636/637: Performed by: FAMILY MEDICINE

## 2022-01-20 PROCEDURE — 36415 COLL VENOUS BLD VENIPUNCTURE: CPT

## 2022-01-20 RX ORDER — CEFPODOXIME PROXETIL 200 MG/1
200 TABLET, FILM COATED ORAL 2 TIMES DAILY
Qty: 14 TABLET | Refills: 0 | Status: SHIPPED | OUTPATIENT
Start: 2022-01-20 | End: 2022-01-27

## 2022-01-20 RX ORDER — CEFPODOXIME PROXETIL 200 MG/1
200 TABLET, FILM COATED ORAL 2 TIMES DAILY
Qty: 14 TABLET | Refills: 0 | Status: SHIPPED | OUTPATIENT
Start: 2022-01-20 | End: 2022-01-20 | Stop reason: SDUPTHER

## 2022-01-20 RX ADMIN — Medication 3 UNITS: at 09:31

## 2022-01-20 RX ADMIN — SODIUM CHLORIDE, PRESERVATIVE FREE 10 ML: 5 INJECTION INTRAVENOUS at 05:55

## 2022-01-20 RX ADMIN — VALSARTAN 320 MG: 320 TABLET, FILM COATED ORAL at 09:31

## 2022-01-20 RX ADMIN — Medication 7 UNITS: at 09:31

## 2022-01-20 RX ADMIN — ENOXAPARIN SODIUM 40 MG: 100 INJECTION SUBCUTANEOUS at 09:31

## 2022-01-20 RX ADMIN — BUPROPION HYDROCHLORIDE 150 MG: 150 TABLET, EXTENDED RELEASE ORAL at 09:30

## 2022-01-20 RX ADMIN — SPIRONOLACTONE 25 MG: 25 TABLET ORAL at 09:31

## 2022-01-20 RX ADMIN — Medication 8 UNITS: at 09:31

## 2022-01-20 NOTE — DISCHARGE SUMMARY
Hospitalist Discharge Summary   Admit Date:  1/15/2022  4:39 PM   DC Note date: 2022  Name:  Alexsander Casillas   Age:  80 y.o. Sex:  female  :  1940   MRN:  [id number]   Room:  [facility]  PCP:  Davon Brasher MD    Presenting Complaint: Urinary Pain    Initial Admission Diagnosis: UTI (urinary tract infection) [N39.0]     Problem List for this Hospitalization:  Hospital Problems as of 2022 Date Reviewed: 2022          Codes Class Noted - Resolved POA    History of nephrolithotomy with removal of calculi ICD-10-CM: Z98.890, Z87.442  ICD-9-CM: V15.29  2022 - Present Yes        * (Principal) Urinary tract infection due to extended-spectrum beta lactamase (ESBL)-producing Klebsiella ICD-10-CM: N39.0, B96.89  ICD-9-CM: 599.0, 041.84  1/15/2022 - Present Yes        Dementia without behavioral disturbance (HCC) (Chronic) ICD-10-CM: F03.90  ICD-9-CM: 294.20  1/15/2022 - Present Yes        ANJANA (acute kidney injury) ([facility] [address] ICD-10-CM: N17.9  ICD-9-CM: 584.9  1/15/2022 - Present Yes        Renal mass, right (Chronic) ICD-10-CM: N28.89  ICD-9-CM: 593.9  2019 - Present Yes        Primary hypertension (Chronic) ICD-10-CM: I10  ICD-9-CM: 401.9  2019 - Present Yes        Type 2 diabetes mellitus with hyperglycemia, with long-term current use of insulin (HCC) (Chronic) ICD-10-CM: E11.65, Z79.4  ICD-9-CM: 250.00, 790.29, V58.67  2019 - Present Yes            Did Patient have Sepsis (YES OR NO): No         Hospital Course:    Patient with past medical history of    Dementia   Hypertension      Patient presented to ED after being told that she needed IV antibiotic for her UTI.      She was seen in clinic for UTI on 2022. She was diagnosed with UTI and had urine culture done. She was started on PO Macrobid.      Her urine culture grew Klebsiella which was sensitive to Cefepime, Zosyn and Gentamicin.      She is started on Cefepime.  CT shows non-obstructing stones in left pole of kidney and right upper pole mass in kidney was found. Urology was consulted. Urology service has been discussing with the patient family. Patient is deemed not a candidate for aggressive intervention as per family. They would not want to pursue any intervention regarding her renal mass at this time. Urology service will follow-up with the patient after hospital discharge. Patient is treated with cefepime for her urinary tract infection. She is doing better. No fever no symptoms. .     She is being discharged on p.o. Vantin. She is in stable condition. Disposition: Skilled Nursing Facility  Diet: ADULT DIET Regular; 4 carb choices (60 gm/meal)  Code Status: DNR    Follow Up Orders: Follow-up Appointments   Procedures    FOLLOW UP VISIT Appointment in: 3 - 5 Days See your primary doctor in 3-5 days. See your primary doctor in 3-5 days. Standing Status:   Standing     Number of Occurrences:   1     Order Specific Question:   Appointment in     Answer:   3 - 5 Days       Follow-up Information     Follow up With Specialties Details Why Contact Info    Cornell Barajas MD Internal Medicine   [address] De[place]CCORMICK  [address]  [phone]            Follow up labs/diagnostics (ultimately defer to outpatient provider):    See primary doctor  See urologist as per their recommendation    Time spent in patient discharge and coordination 35 minutes. Plan was discussed with patient and care team.  All questions answered. Patient was stable at time of discharge. Instructions given to call a physician or return if any concerns. Discharge Info:   Current Discharge Medication List      START taking these medications    Details   cefpodoxime (VANTIN) 200 mg tablet Take 1 Tablet by mouth two (2) times a day for 7 days.   Qty: 14 Tablet, Refills: 0  Start date: 1/20/2022, End date: 1/27/2022         CONTINUE these medications which have NOT CHANGED    Details   donepeziL (ARICEPT) 10 mg tablet Take 1 Tab by mouth nightly. 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 ALPRAZolam (XANAX) 0.25 mg tablet Take 0.25 mg by mouth nightly. valsartan (DIOVAN) 320 mg tablet Take 320 mg by mouth daily. buPROPion XL (WELLBUTRIN XL) 300 mg XL tablet Take 300 mg by mouth every morning. atorvastatin (LIPITOR) 40 mg tablet Take 40 mg by mouth nightly. insulin NPH/insulin regular (HUMULIN 70/30 U-100 INSULIN) 100 unit/mL (70-30) injection 10 Units by SubCUTAneous route two (2) times a day. diphenhydrAMINE (BenadryL) 25 mg capsule Take both tabs 1 hour prior to CT scan  Qty: 2 Cap, Refills: 0    Associated Diagnoses: Hives             Procedures done this admission:  * No surgery found *    Consults this admission:  IP CONSULT TO PALLIATIVE CARE - PROVIDER  IP CONSULT TO UROLOGY    Echocardiogram/EKG results:  No results found for this or any previous visit. EKG Results     None          Diagnostic Imaging/Tests:   No results found.     All Micro Results     Procedure Component Value Units Date/Time    CULTURE, BLOOD [[id number]] Collected: 01/15/22 1741    Order Status: Completed Specimen: Blood Updated: 01/20/22 0720     Special Requests: --        LEFT  Antecubital       Culture result: NO GROWTH 5 DAYS       CULTURE, BLOOD [[id number]] Collected: 01/15/22 1741    Order Status: Completed Specimen: Blood Updated: 01/20/22 0720     Special Requests: --        RIGHT  FOREARM       Culture result: NO GROWTH 5 DAYS       COVID-19 RAPID TEST [[id number]] Collected: 01/19/22 1628    Order Status: Completed Specimen: Nasopharyngeal Updated: 01/19/22 1652     Specimen source Nasopharyngeal        COVID-19 rapid test Not detected        Comment:      The specimen is NEGATIVE for SARS-CoV-2, the novel coronavirus associated with COVID-19. A negative result does not rule out COVID-19. This test has been authorized by the FDA under an Emergency Use Authorization (EUA) for use by authorized laboratories. Fact sheet for Healthcare Providers: SPark!.co.nz  Fact sheet for Patients: SPark!.co.       Methodology: Isothermal Nucleic Acid Amplification         COVID-19 RAPID TEST [[id number]] Collected: 01/15/22 1913    Order Status: Completed Specimen: Nasopharyngeal Updated: 01/15/22 1945     Specimen source NASAL SWAB        COVID-19 rapid test Not detected        Comment:      The specimen is NEGATIVE for SARS-CoV-2, the novel coronavirus associated with COVID-19. A negative result does not rule out COVID-19. This test has been authorized by the FDA under an Emergency Use Authorization (EUA) for use by authorized laboratories. Fact sheet for Healthcare Providers: [employer]  Fact sheet for Patients: [employer].       Methodology: Isothermal Nucleic Acid Amplification               Labs: Results:       BMP, Mg, Phos Recent Labs     01/20/22  0543 01/19/22  1349 01/18/22  0539    135* 140   K 4.0 4.7 3.4*   * 104 108*   CO2 24 24 26   AGAP 6* 7 6*   BUN 25* 26* 18   CREA 0.96 1.21* 0.95   CA 9.6 10.1 9.8   * 339* 181*      CBC Recent Labs     01/18/22  0539   WBC 4.3   RBC 3.80*   HGB 11.5*   HCT 34.9*         LFT No results for input(s): ALT, TBIL, AP, TP, ALB, GLOB, AGRAT in the last 72 hours.     No lab exists for component: SGOT, GPT   Cardiac Testing No results found for: BNPP, BNP, CPK, RCK1, RCK2, RCK3, RCK4, CKMB, CKNDX, CKND1, TROPT, TROIQ   Coagulation Tests No results found for: PTP, INR, APTT, INREXT   A1c Lab Results   Component Value Date/Time    Hemoglobin A1c 6.2 01/09/2019 03:29 AM      Lipid Panel No results found for: CHOL, CHOLPOCT, CHOLX, CHLST, CHOLV, 199023, HDL, HDLP, LDL, LDLC, DLDLP, 692594, VLDLC, VLDL, TGLX, TRIGL, TRIGP, TGLPOCT, CH[facility], [facility]   Thyroid Panel Lab Results   Component Value Date/Time    TSH 2.580 01/09/2019 03:29 AM        Most Recent UA Lab Results   Component Value Date/Time    Color YELLOW 09/22/2020 01:06 PM    Appearance CLOUDY 09/22/2020 01:06 PM    Specific gravity 1.004 02/07/2019 03:45 PM    pH (UA) 6.0 09/22/2020 01:06 PM    Protein Negative 09/22/2020 01:06 PM    Glucose >=1000 09/22/2020 01:06 PM    Ketone Negative 09/22/2020 01:06 PM    Bilirubin Negative 09/22/2020 01:06 PM    Blood TRACE (A) 09/22/2020 01:06 PM    Urobilinogen 0.2 09/22/2020 01:06 PM    Nitrites Negative 09/22/2020 01:06 PM    Leukocyte Esterase MODERATE (A) 09/22/2020 01:06 PM    WBC  01/15/2022 05:41 PM    RBC 0-3 01/15/2022 05:41 PM    Epithelial cells 0-3 01/15/2022 05:41 PM    Bacteria 2+ (H) 01/15/2022 05:41 PM    Casts HYALINE 01/15/2022 05:41 PM    Crystals, urine 0 01/15/2022 05:41 PM    Mucus 0 01/15/2022 05:41 PM    Other observations RESULTS VERIFIED MANUALLY 01/15/2022 05:41 PM          All Labs from Last 24 Hrs:  Recent Results (from the past 24 hour(s))   METABOLIC PANEL, BASIC    Collection Time: 01/19/22  1:49 PM   Result Value Ref Range    Sodium 135 (L) 136 - 145 mmol/L    Potassium 4.7 3.5 - 5.1 mmol/L    Chloride 104 98 - 107 mmol/L    CO2 24 21 - 32 mmol/L    Anion gap 7 7 - 16 mmol/L    Glucose 339 (H) 65 - 100 mg/dL    BUN 26 (H) 8 - 23 MG/DL    Creatinine 1.21 (H) 0.6 - 1.0 MG/DL    GFR est AA 55 (L) >60 ml/min/1.73m2    GFR est non-AA 46 (L) >60 ml/min/1.73m2    Calcium 10.1 8.3 - 10.4 MG/DL   GLUCOSE, POC    Collection Time: 01/19/22  4:22 PM   Result Value Ref Range    Glucose (POC) 440 (H) 65 - 100 mg/dL    Performed by Ling    COVID-19 RAPID TEST    Collection Time: 01/19/22  4:28 PM   Result Value Ref Range    Specimen source Nasopharyngeal      COVID-19 rapid test Not detected NOTD     GLUCOSE, POC    Collection Time: 01/19/22  9:30 PM   Result Value Ref Range    Glucose (POC) 202 (H) 65 - 100 mg/dL    Performed by Daphnie    METABOLIC PANEL, BASIC    Collection Time: 01/20/22  5:43 AM   Result Value Ref Range    Sodium 139 136 - 145 mmol/L    Potassium 4.0 3.5 - 5.1 mmol/L    Chloride 109 (H) 98 - 107 mmol/L    CO2 24 21 - 32 mmol/L    Anion gap 6 (L) 7 - 16 mmol/L    Glucose 199 (H) 65 - 100 mg/dL    BUN 25 (H) 8 - 23 MG/DL    Creatinine 0.96 0.6 - 1.0 MG/DL    GFR est AA >60 >60 ml/min/1.73m2    GFR est non-AA 60 (L) >60 ml/min/1.73m2    Calcium 9.6 8.3 - 10.4 MG/DL   GLUCOSE, POC    Collection Time: 01/20/22  9:02 AM   Result Value Ref Range    Glucose (POC) 314 (H) 65 - 100 mg/dL    Performed by Jessica        Current Med List in Hospital:   Current Facility-Administered Medications   Medication Dose Route Frequency    insulin lispro (HUMALOG) injection   SubCUTAneous AC&HS    ALPRAZolam (XANAX) tablet 0.25 mg  0.25 mg Oral TID PRN    buPROPion SR (WELLBUTRIN SR) tablet 150 mg  150 mg Oral BID    atorvastatin (LIPITOR) tablet 40 mg  40 mg Oral QHS    valsartan (DIOVAN) tablet 320 mg  320 mg Oral DAILY    spironolactone (ALDACTONE) tablet 25 mg  25 mg Oral DAILY    ALPRAZolam (XANAX) tablet 0.25 mg  0.25 mg Oral QHS    donepeziL (ARICEPT) tablet 10 mg  10 mg Oral QHS    sodium chloride (NS) flush 5-40 mL  5-40 mL IntraVENous Q8H    sodium chloride (NS) flush 5-40 mL  5-40 mL IntraVENous PRN    acetaminophen (TYLENOL) tablet 650 mg  650 mg Oral Q6H PRN    Or    acetaminophen (TYLENOL) suppository 650 mg  650 mg Rectal Q6H PRN    polyethylene glycol (MIRALAX) packet 17 g  17 g Oral DAILY PRN    ondansetron (ZOFRAN ODT) tablet 4 mg  4 mg Oral Q8H PRN    Or    ondansetron (ZOFRAN) injection 4 mg  4 mg IntraVENous Q6H PRN    enoxaparin (LOVENOX) injection 40 mg  40 mg SubCUTAneous DAILY    lactated Ringers infusion  75 mL/hr IntraVENous CONTINUOUS    cefepime (MAXIPIME) 1 g in 0.9% sodium chloride (MBP/ADV) 50 mL MBP  1 g IntraVENous Q24H    insulin NPH (NOVOLIN N, HUMULIN N) injection 7 Units  7 Units SubCUTAneous ACB&D    And    insulin regular (NOVOLIN R, HUMULIN R) injection 3 Units  3 Units SubCUTAneous ACB&D       Allergies   Allergen Reactions    Codeine Other (comments)    Contrast Agent [Iodine] Hives     Needs allergy protocol prior to contrast    Pcn [Penicillins] Unknown (comments)     Immunization History   Administered Date(s) Administered    TB Skin Test (PPD) Intradermal 01/24/2019, 06/30/2019       Recent Vital Data:  Patient Vitals for the past 24 hrs:   Temp Pulse Resp BP SpO2   01/20/22 0729 98.1 °F (36.7 °C) 72 18 (!) 147/59 96 %   01/20/22 0225 98.3 °F (36.8 °C) (!) 57 18 (!) 123/42 97 %   01/19/22 2250 98.1 °F (36.7 °C) 61 18 (!) 167/56 97 %   01/19/22 1927  65 18 (!) 161/63 97 %   01/19/22 1458 98.7 °F (37.1 °C) 71 18 (!) 146/89 95 %     Oxygen Therapy  O2 Sat (%): 96 % (01/20/22 0729)  O2 Device: None (Room air) (01/19/22 1927)    Estimated body mass index is 21.3 kg/m² as calculated from the following:    Height as of this encounter: 5' 5\" (1.651 m). Weight as of this encounter: 58.1 kg (128 lb). Intake/Output Summary (Last 24 hours) at 1/20/2022 0955  Last data filed at 1/20/2022 0820  Gross per 24 hour   Intake 1355 ml   Output 300 ml   Net 1055 ml         Physical Exam:    Visit Vitals  BP (!) 147/59 (BP 1 Location: Left upper arm, BP Patient Position: At rest)   Pulse 72   Temp 98.1 °F (36.7 °C)   Resp 18   Ht 5' 5\" (1.651 m)   Wt 58.1 kg (128 lb)   SpO2 96%   BMI 21.30 kg/m²      General:          Well nourished. No overt distress. Patient is resting quietly in bed. Head:               Normocephalic, atraumatic  Eyes:               Sclerae appear normal.  Pupils equally round. ENT:                Nares appear normal, no drainage. Moist oral mucosa  Neck:               No restricted ROM. Trachea midline   CV:                  RRR. No m/r/g.   No jugular venous distension. Lungs:             CTAB. No wheezing, rhonchi, or rales. Respirations even, unlabored  Abdomen: Bowel sounds present. Soft, nontender, nondistended. Extremities:     No cyanosis or clubbing. No edema  Non-tender at costovertebral angles. Skin:                No rashes and normal coloration. Warm and dry. Neuro:             CN II-XII grossly intact. Sensation intact. A&Ox3  Psych:             Normal mood and affect. Signed:  Sharan Landis MD    Part of this note may have been written by using a voice dictation software. The note has been proof read but may still contain some grammatical/other typographical errors.

## 2022-01-20 NOTE — PROGRESS NOTES
Problem: Falls - Risk of  Goal: *Absence of Falls  Description: Document Binu Muse Fall Risk and appropriate interventions in the flowsheet. Outcome: Progressing Towards Goal  Note: Fall Risk Interventions:  Mobility Interventions: Assess mobility with egress test,Patient to call before getting OOB,Bed/chair exit alarm    Mentation Interventions: Adequate sleep, hydration, pain control,Bed/chair exit alarm,Increase mobility    Medication Interventions: Assess postural VS orthostatic hypotension,Bed/chair exit alarm,Patient to call before getting OOB    Elimination Interventions: Bed/chair exit alarm,Call light in reach,Patient to call for help with toileting needs              Problem: Patient Education: Go to Patient Education Activity  Goal: Patient/Family Education  Outcome: Progressing Towards Goal     Problem: Diabetes Self-Management  Goal: *Disease process and treatment process  Description: Define diabetes and identify own type of diabetes; list 3 options for treating diabetes. Outcome: Progressing Towards Goal  Goal: *Incorporating nutritional management into lifestyle  Description: Describe effect of type, amount and timing of food on blood glucose; list 3 methods for planning meals. Outcome: Progressing Towards Goal  Goal: *Incorporating physical activity into lifestyle  Description: State effect of exercise on blood glucose levels. Outcome: Progressing Towards Goal  Goal: *Developing strategies to promote health/change behavior  Description: Define the ABC's of diabetes; identify appropriate screenings, schedule and personal plan for screenings. Outcome: Progressing Towards Goal  Goal: *Using medications safely  Description: State effect of diabetes medications on diabetes; name diabetes medication taking, action and side effects.   Outcome: Progressing Towards Goal  Goal: *Monitoring blood glucose, interpreting and using results  Description: Identify recommended blood glucose targets  and personal targets. Outcome: Progressing Towards Goal  Goal: *Prevention, detection, treatment of acute complications  Description: List symptoms of hyper- and hypoglycemia; describe how to treat low blood sugar and actions for lowering  high blood glucose level. Outcome: Progressing Towards Goal  Goal: *Prevention, detection and treatment of chronic complications  Description: Define the natural course of diabetes and describe the relationship of blood glucose levels to long term complications of diabetes.   Outcome: Progressing Towards Goal     Problem: Patient Education: Go to Patient Education Activity  Goal: Patient/Family Education  Outcome: Progressing Towards Goal     Problem: General Medical Care Plan  Goal: *Vital signs within specified parameters  Outcome: Progressing Towards Goal  Goal: *Labs within defined limits  Outcome: Progressing Towards Goal  Goal: *Absence of infection signs and symptoms  Outcome: Progressing Towards Goal  Goal: *Optimal pain control at patient's stated goal  Outcome: Progressing Towards Goal  Goal: *Skin integrity maintained  Outcome: Progressing Towards Goal  Goal: *Fluid volume balance  Outcome: Progressing Towards Goal  Goal: *Optimize nutritional status  Outcome: Progressing Towards Goal  Goal: *Anxiety reduced or absent  Outcome: Progressing Towards Goal  Goal: *Progressive mobility and function (eg: ADL's)  Outcome: Progressing Towards Goal     Problem: Patient Education: Go to Patient Education Activity  Goal: Patient/Family Education  Outcome: Progressing Towards Goal     Problem: Urinary Tract Infection - Adult  Goal: *Absence of infection signs and symptoms  Outcome: Progressing Towards Goal     Problem: Patient Education: Go to Patient Education Activity  Goal: Patient/Family Education  Outcome: Progressing Towards Goal

## 2022-01-20 NOTE — PROGRESS NOTES
Care Management Interventions  PCP Verified by CM: Yes  Palliative Care Criteria Met (RRAT>21 & CHF Dx)?: No  Transition of Care Consult (CM Consult): Assisted Living ([facility] on [address])  Discharge Durable Medical Equipment: No  Physical Therapy Consult: Yes  Occupational Therapy Consult: Yes  Speech Therapy Consult: No  Support Systems: Assisted Living (Patient lives at the [facility] on [address] [address] [phone])  The Plan for Transition of Care is Related to the Following Treatment Goals : Plans to return to [facility] [address] [facility] , [facility] [place]  Discharge Location  Patient Expects to be Discharged to[de-identified] Assisted living  Read-Only, Retired: Discharge Placement: Assisted Living    Sw reviewed chart. Urology consult noted. Spoke with Rn this am about pt's return to [facility] today. Await Md to determine if pt medically stable and ready for discharge. Will ask family about transportation. Bala Isaacs    Pt is medically stable for discharge. Veronica called three children and left multiple messages. Veronica called [facility] to inform of pt's return. Nicki mcneal. Packet prepared. Await family decision about who will transport pt.    Bala Isaacs

## 2022-01-20 NOTE — PROGRESS NOTES
Discharge summary and education gone over with pt and daughter at bedside. Daughter will take pt back to [facility]. IV removed and case management packed handed to daughter at bedside. All questions answered.

## 2022-01-20 NOTE — PROGRESS NOTES
END OF SHIFT NOTE:    Intake/Output  01/19 1901 - 01/20 0700  In: 552 [I.V.:875]  Out: -    Voiding: YES  Catheter: NO  Drain:              Stool:  0 occurrences. Stool Assessment  Stool Appearance: Watery (per pt) (01/18/22 0740)    Emesis:  0 occurrences. VITAL SIGNS  Patient Vitals for the past 12 hrs:   Temp Pulse Resp BP SpO2   01/20/22 0225 98.3 °F (36.8 °C) (!) 57 18 (!) 123/42 97 %   01/19/22 2250 98.1 °F (36.7 °C) 61 18 (!) 167/56 97 %   01/19/22 1927 98.9 °F (37.2 °C) 65 18 (!) 161/63 97 %       Pain Assessment  Pain 1  Pain Scale 1: Numeric (0 - 10) (01/20/22 0225)  Pain Intensity 1: 0 (01/20/22 0225)  Patient Stated Pain Goal: 0 (01/20/22 0225)  Pain Reassessment 1: Patient resting w/respiratory rate greater than 10 (01/18/22 1400)  Pain Location 1: Pelvis (01/15/22 1513)    Ambulating  Yes    Additional Information:       Shift report given to oncoming nurse at the bedside.     Alyson Haque RN

## 2022-01-27 NOTE — PROGRESS NOTES
Physician Progress Note      PATIENT:               Agustín Sanchez  CSN #:                  [id number]  :                       1940  ADMIT DATE:       1/15/2022 4:39 PM  [address] [place] DATE:        2022 2:06 PM  RESPONDING  PROVIDER #:        Orquidea Woodard MD          QUERY TEXT:    Pt admitted with ANJANA. Pt noted to have altered mental status. If possible, please document in the progress notes and discharge summary if you are evaluating and / or treating any of the following: The medical record reflects the following:  Risk Factors: UTI, mild to moderate Alzheimer's type dementia  Clinical Indicators \"Displaying maybe some mild confusion but able to recall distant events and carry on a normal conversation\"  Treatment: Donepezil, bupropion, alprazolam,  Options provided:  -- Encephalopathy due to, Please specify. -- Metabolic encephalopathy  -- Other - I will add my own diagnosis  -- Disagree - Not applicable / Not valid  -- Disagree - Clinically unable to determine / Unknown  -- Refer to Clinical Documentation Reviewer    PROVIDER RESPONSE TEXT:    This patient has metabolic encephalopathy.     Query created by: Norman Evans on 2022 3:20 PM      Electronically signed by:  Orquidea Woodard MD 2022 7:05 AM

## 2022-03-18 PROBLEM — N95.2 VAGINAL ATROPHY: Status: ACTIVE | Noted: 2019-05-20

## 2022-03-18 PROBLEM — B96.89 URINARY TRACT INFECTION DUE TO EXTENDED-SPECTRUM BETA LACTAMASE (ESBL)-PRODUCING KLEBSIELLA: Status: ACTIVE | Noted: 2022-01-15

## 2022-03-18 PROBLEM — Z87.442 HISTORY OF NEPHROLITHOTOMY WITH REMOVAL OF CALCULI: Status: ACTIVE | Noted: 2022-01-17

## 2022-03-18 PROBLEM — Z98.890 HISTORY OF NEPHROLITHOTOMY WITH REMOVAL OF CALCULI: Status: ACTIVE | Noted: 2022-01-17

## 2022-03-18 PROBLEM — N39.0 URINARY TRACT INFECTION DUE TO EXTENDED-SPECTRUM BETA LACTAMASE (ESBL)-PRODUCING KLEBSIELLA: Status: ACTIVE | Noted: 2022-01-15

## 2022-03-18 PROBLEM — N17.9 AKI (ACUTE KIDNEY INJURY) (HCC): Status: ACTIVE | Noted: 2022-01-15

## 2022-03-19 PROBLEM — N28.89 RENAL MASS, RIGHT: Status: ACTIVE | Noted: 2019-06-27

## 2022-03-19 PROBLEM — E11.65 TYPE 2 DIABETES MELLITUS WITH HYPERGLYCEMIA, WITH LONG-TERM CURRENT USE OF INSULIN (HCC): Status: ACTIVE | Noted: 2019-01-08

## 2022-03-19 PROBLEM — I10 PRIMARY HYPERTENSION: Status: ACTIVE | Noted: 2019-01-08

## 2022-03-19 PROBLEM — Z79.4 TYPE 2 DIABETES MELLITUS WITH HYPERGLYCEMIA, WITH LONG-TERM CURRENT USE OF INSULIN (HCC): Status: ACTIVE | Noted: 2019-01-08

## 2022-03-20 PROBLEM — F03.90 DEMENTIA WITHOUT BEHAVIORAL DISTURBANCE (HCC): Status: ACTIVE | Noted: 2022-01-15

## 2022-03-20 PROBLEM — N39.0 RECURRENT UTI: Status: ACTIVE | Noted: 2020-02-20

## 2022-11-07 ENCOUNTER — OFFICE VISIT (OUTPATIENT)
Dept: UROLOGY | Age: 82
End: 2022-11-07
Payer: MEDICARE

## 2022-11-07 DIAGNOSIS — N20.0 CALCULUS OF KIDNEY: Primary | ICD-10-CM

## 2022-11-07 DIAGNOSIS — N39.0 URINARY TRACT INFECTION WITHOUT HEMATURIA, SITE UNSPECIFIED: ICD-10-CM

## 2022-11-07 DIAGNOSIS — C64.1 MALIGNANT NEOPLASM OF RIGHT KIDNEY, EXCEPT RENAL PELVIS (HCC): ICD-10-CM

## 2022-11-07 LAB
BILIRUBIN, URINE, POC: NEGATIVE
BLOOD URINE, POC: ABNORMAL
GLUCOSE URINE, POC: 250
KETONES, URINE, POC: NEGATIVE
LEUKOCYTE ESTERASE, URINE, POC: ABNORMAL
NITRITE, URINE, POC: ABNORMAL
PH, URINE, POC: 5.5 (ref 4.6–8)
PROTEIN,URINE, POC: NEGATIVE
SPECIFIC GRAVITY, URINE, POC: 1.01 (ref 1–1.03)
URINALYSIS CLARITY, POC: CLEAR
URINALYSIS COLOR, POC: YELLOW
UROBILINOGEN, POC: 0.2

## 2022-11-07 PROCEDURE — G8484 FLU IMMUNIZE NO ADMIN: HCPCS | Performed by: UROLOGY

## 2022-11-07 PROCEDURE — 4004F PT TOBACCO SCREEN RCVD TLK: CPT | Performed by: UROLOGY

## 2022-11-07 PROCEDURE — 1090F PRES/ABSN URINE INCON ASSESS: CPT | Performed by: UROLOGY

## 2022-11-07 PROCEDURE — 99213 OFFICE O/P EST LOW 20 MIN: CPT | Performed by: UROLOGY

## 2022-11-07 PROCEDURE — G8427 DOCREV CUR MEDS BY ELIG CLIN: HCPCS | Performed by: UROLOGY

## 2022-11-07 PROCEDURE — 81003 URINALYSIS AUTO W/O SCOPE: CPT | Performed by: UROLOGY

## 2022-11-07 PROCEDURE — G8399 PT W/DXA RESULTS DOCUMENT: HCPCS | Performed by: UROLOGY

## 2022-11-07 PROCEDURE — G8420 CALC BMI NORM PARAMETERS: HCPCS | Performed by: UROLOGY

## 2022-11-07 PROCEDURE — 1123F ACP DISCUSS/DSCN MKR DOCD: CPT | Performed by: UROLOGY

## 2022-11-07 RX ORDER — DULAGLUTIDE 0.75 MG/.5ML
INJECTION, SOLUTION SUBCUTANEOUS
COMMUNITY
Start: 2022-10-28

## 2022-11-07 NOTE — PROGRESS NOTES
[facility]  [address]  [address] [facility] Corey Turcios, [address] [facility]  [phone]    Zoie Archibald  : 1940    Chief Complaint   Patient presents with    Follow-up     Calculus of kidney        HPI     Zoie Archibald is a [de-identified] y.o. female   Referred by Dr. Bertin Chavez for evaluation and treatment of renal mass. Pt was admitted twice in  with UTI, hyponatremia, confusion. Was given Macrobid 100 mg bid for 7 days on 3-25-19. Had UTI then. Was also started on Premarin. cream.   Had KATHRYN on 19: Findings: The right kidney measures 10.9 CM in length. A mildly complex  cystic lesion with internal septations is seen in the right renal interpolar  cortex measuring 2.3 x 2.4 x 2.3 cm, without flow. Small simple appearing right  renal interpolar cortical cyst measuring 4.2 x 3.9 x 4.5 cm, without flow. A  hyperechoic mass with possible peripheral calcifications causing shadowing is  seen in the right renal lower pole cortex measuring 1.9 x 1.7 x 1.9 cm, without  flow. Small simple appearing left renal upper pole cortical cysts are seen  measuring up to 2.7 x 3.1 cm, without flow. A small echogenic focus is noted in  the left renal lower pole medullary level measuring 1.2 x 0.6 cm, likely  representing a small nonobstructing calculus. Left kidney measures  11.1 CM. Partially full urinary bladder appears unremarkable. IMPRESSION  Impression:  1. No evidence of hydronephrosis. Probable small nonobstructing left renal  calculus. 2.  Mildly complex right renal interpolar cortical cyst with a hyperechoic  probably calcified right renal lower pole cortical mass. Elective CT of the  abdomen with and without contrast renal mass protocol is recommended for further  Evaluation. Had CT w/wo on 19: FINDINGS:  KIDNEYS/URETERS: There are multiple simple cysts in both kidneys. Largest is in  the upper pole the right kidney, 3.5 cm in diameter.   There is also a septated  cyst in the lateral upper pole of the right kidney. Measures 2.4 cm in  diameter. Measures 22 Hounsfield units precontrast and 35 Hounsfield units  postcontrast.  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 This corresponds to the echogenic mass noted on the ultrasound. There  is a 2 cm stone in the lower pole of the left kidney. There is no  hydronephrosis. She has IDDM. Creatinine 0.5 in 1-19. CT wo/w on 6-3-19: FINDINGS:  -KIDNEYS/URETERS: The 2 cm complex mass in the upper pole of the right kidney  shows increased enhancement compared to the prior study. It now measures 121  Hounsfield units postcontrast and 36 Hounsfield units postcontrast.  Multiple  nonenhancing cysts are present in both kidneys. There is also a 18 mm stone in  the lower pole of the left kidney. IMPRESSION: Increased enhancement of the mass in the upper pole of the right  kidney, likely renal cell carcinoma. She has hx of right open renal surgery for stones in the past. Has right flank incision. Had right KAYLAN partial nephrectomy on 6-27-19. DIAGNOSIS   RIGHT RENAL MASS: RENAL CELL CARCINOMA. PROCEDURE: Right excisional biopsy. TUMOR SIZE: 1.4 cm. HISTOLOGIC TYPE: Clear cell renal cell carcinoma. SARCOMATOID FEATURES: Not identified. RHABDOID FEATURES: Not identified. HISTOLOGIC GRADE: G2.   SURGICAL MARGINS: involved by renal cell carcinoma. NECROSIS: Not identified. LYMPHOVASCULAR INVASION: Not identified   Dr. Bren Huerta has also examined this specimen and concurs with the diagnosis. CT 5-15-20: IMPRESSION:    1. The patient once again demonstrates bilateral renal cortical changes. The  only concerning abnormality on today's study is an enhancing mass in the upper  pole cortex of the right kidney. Although not significantly changed in size,  this demonstrates increasing enhancing soft tissue especially when viewed over  time.  This is felt to be an indicator of evolving viable tumor within this  lesion. Additional renal cortical lesions are not significantly changed. 2. No findings concerning for evolving metastatic disease or renal vein invasion  in the visualized abdomen. 3. Stable nonobstructing left renal stones. CT w/wo 10-19-20: IMPRESSION:  1. Slight interval enlargement of a right superior pole enhancing solid renal  lesion measuring 21 x 25 mm suspicious for renal cell carcinoma. Right lower  pole enhancing 8 mm renal lesion which is unchanged from priors. 2. Redemonstrated bilateral renal cysts as well as subcentimeter hypodense renal  lesions which are too small to fully characterize. 3. Large renal stone burden of the left lower pole with focally associated  hydronephrosis and cortical thinning. CT shows enhancing 2.5 cm right upper pole mass, 8 mm right lower pole mass. Also stones in lower left kidney with lower calyceal obstruction. Pt has been stable but having some UTI. I discussed the small possibility of developing metastatic RCC. Options of close surveillance, percutaneous cryoablation or surgery were discussed. Cryo may be a good option. KUB today [id number] mm stone in left lower calyx. She is currently on abx for recent UTI, was given abc at [facility]. ????she has hives when she has IV contrast. Does OK with steroid prep. Will follow right renal mass for now. Possible perc cryoablation in the future. UTI's may be related to the left renal stones. Consider CT urogram in future, especially if she has continued UTI. She was admitted with UTI in 1-22. CT urogram 1-17-22: ABDOMEN:    There are multiple nonobstructing stones in lower pole likely  consistent with the left kidney. There are no ureteral calculi and there is no  hydronephrosis. Multiple left-sided renal cysts are present.      In addition, there is an incompletely characterized mass in the upper pole of  the right renal cortex that measures 2.7 cm in diameter (image 43). This was  characterized on the prior renal mass protocol CT October 19, 2020 and was  suspicious for a renal tumor. Evaluation of the abdominal viscera is limited without IV contrast.   Cholecystectomy clips are present. The unenhanced appearance of the gallbladder,  liver, pancreas, spleen and adrenal glands is normal.     The appendix is normal in appearance. PELVIS: Changes of a hysterectomy are present. There are a few calcified  phleboliths within the pelvis. The bladder is normal appearance. There is no  free pelvic fluid. Bone windows demonstrated no aggressive osseous lesions. IMPRESSION     1. Nonobstructing lower pole left renal collecting system stones. 2. Upper pole right renal cortical mass, likely a solid renal neoplasm. Urological follow-up is recommended along with follow-up dedicated imaging  evaluation with a renal mass protocol CT or MRI. CT w/wo 1-19-22:   Abdomen findings: There is a 2.5 cm heterogeneously enhancing solid mass at the upper pole of the  right kidney. There are multiple left renal cysts, largest measuring up to 2.5 cm. Large  nonobstructing calculi at the lower pole of the left kidney. There is no  hydronephrosis. Micronodular contour of the liver, raising the question of cirrhosis. Gallbladder is surgically absent. The pancreas, spleen, and adrenal glands are  unremarkable. Abdominal aorta is normal in course and caliber with prominent  atherosclerotic calcifications. Stomach is normal in contour. Visualized small and large bowel loops are of  normal in caliber. No evidence of bowel obstruction. No free air or free fluid in the abdomen. Surrounding bones are intact. IMPRESSION     1. An approximately 2.5 cm heterogeneously enhancing cortical mass at the upper  pole of the right kidney, suspicious for renal cell carcinoma. This is similar  to CT from October 19, 2020.     2. Nonobstructing left renal calculi.  No hydronephrosis. Cr 0.96 in 1-22. ????2.5 cm enhancing mass right kidney. Stable in size back to 2020. Discussed options of observation, cryoablation, partial or total nephrectomy. Will follow for now. Left renal stones are nonobstructing. Pt would like to follow the renal mass. She hasn't had any UTI sxs since 1-22. She didn't get the renal US. Past Medical History:   Diagnosis Date    Anxiety     Diabetes ([facility] [address]     Type 2- 140-160 avg- oral and insulin- hypo below 60 A1C 6.2 1/9/2019    Diarrhea 1/8/2019    Encephalopathy 1/23/2019    Endocrine disease     Hypertension     Hyponatremia 1/8/2019    Kidney stones     Menopause     Metabolic encephalopathy 7/30/3873    Renal mass      Past Surgical History:   Procedure Laterality Date    HYSTERECTOMY      LAP,CHOLECYSTECTOMY      OOPHORECTOMY      REMOVAL OF KIDNEY STONE       Current Outpatient Medications   Medication Sig Dispense Refill    TRULICITY 9.07 TP/3.7TR SOPN       ALPRAZolam (XANAX) 0.25 MG tablet Take 0.25 mg by mouth. atorvastatin (LIPITOR) 40 MG tablet Take 40 mg by mouth      buPROPion (WELLBUTRIN XL) 300 MG extended release tablet Take 300 mg by mouth      diphenhydrAMINE (BENADRYL) 25 MG capsule Take both tabs 1 hour prior to CT scan      donepezil (ARICEPT) 10 MG tablet Take 10 mg by mouth      insulin 70-30 (HUMULIN;NOVOLIN) (70-30) 100 UNIT per ML injection vial Inject 10 Units into the skin 2 times daily      spironolactone (ALDACTONE) 25 MG tablet Take 25 mg by mouth daily      valsartan (DIOVAN) 320 MG tablet Take 320 mg by mouth daily       No current facility-administered medications for this visit. Allergies   Allergen Reactions    Codeine Other (See Comments)    Iodine Hives     Needs allergy protocol prior to contrast    Penicillins Other (See Comments)     Social History     Socioeconomic History    Marital status:       Spouse name: Not on file    Number of children: Not on file    Years of education: Not on file    Highest education level: Not on file   Occupational History    Not on file   Tobacco Use    Smoking status: Never    Smokeless tobacco: Never   Substance and Sexual Activity    Alcohol use: No    Drug use: Never    Sexual activity: Not on file   Other Topics Concern    Not on file   Social History Narrative    Not on file     Social Determinants of Health     Financial Resource Strain: Not on file   Food Insecurity: Not on file   Transportation Needs: Not on file   Physical Activity: Not on file   Stress: Not on file   Social Connections: Not on file   Intimate Partner Violence: Not on file   Housing Stability: Not on file     Family History   Problem Relation Age of Onset    Thyroid Disease Mother     Hypertension Mother     Diabetes Brother     Breast Cancer Neg Hx        ROS    There were no vitals taken for this visit. Urinalysis  UA - Dipstick  Results for orders placed or performed in visit on 11/07/22   AMB POC URINALYSIS DIP STICK AUTO W/O MICRO   Result Value Ref Range    Color, Urine, POC yellow     Clarity, Urine, POC clear     Glucose, Urine,  Negative    Bilirubin, Urine, POC Negative Negative    Ketones, Urine, POC Negative Negative    Specific Gravity, Urine, POC 1.015 1.001 - 1.035    Blood, Urine, POC small Negative    pH, Urine, POC 5.5 4.6 - 8.0    Protein, Urine, POC Negative Negative    Urobilinogen, POC 0.2     Nitrate, Urine, POC neg Negative    Leukocyte Esterase, Urine, POC Large (A) Negative       UA - Micro  WBC - 25-30  RBC - 0  Bacteria - 0  Epith - 0    Physical Exam    Assessment and Plan    Janifer Goldmann was seen today for follow-up. Diagnoses and all orders for this visit:    Calculus of kidney  -     AMB POC URINALYSIS DIP STICK AUTO W/O MICRO    Urinary tract infection without hematuria, site unspecified  -     Culture, Urine; Future    Malignant neoplasm of right kidney, except renal pelvis (HCC)  -     US RETROPERITONEAL LIMITED;  Future   Call daughter with result.    Return for call patient to arrange follow-up.  k

## 2022-11-10 LAB
BACTERIA SPEC CULT: ABNORMAL
SERVICE CMNT-IMP: ABNORMAL

## 2022-11-11 ENCOUNTER — TELEPHONE (OUTPATIENT)
Dept: UROLOGY | Age: 82
End: 2022-11-11

## 2022-11-11 DIAGNOSIS — N39.0 URINARY TRACT INFECTION WITHOUT HEMATURIA, SITE UNSPECIFIED: Primary | ICD-10-CM

## 2022-11-11 RX ORDER — SULFAMETHOXAZOLE AND TRIMETHOPRIM 800; 160 MG/1; MG/1
1 TABLET ORAL 2 TIMES DAILY
Qty: 6 TABLET | Refills: 0 | Status: SHIPPED | OUTPATIENT
Start: 2022-11-11 | End: 2022-11-14

## 2022-11-11 NOTE — TELEPHONE ENCOUNTER
Diagnosis Orders   1.  Urinary tract infection without hematuria, site unspecified  sulfamethoxazole-trimethoprim (BACTRIM DS;SEPTRA DS) 800-160 MG per tablet        I eprescribed the med  She has UTI

## 2022-11-12 ENCOUNTER — HOSPITAL ENCOUNTER (OUTPATIENT)
Dept: ULTRASOUND IMAGING | Age: 82
Discharge: HOME | End: 2022-11-15
Payer: MEDICARE

## 2022-11-12 DIAGNOSIS — C64.1 MALIGNANT NEOPLASM OF RIGHT KIDNEY, EXCEPT RENAL PELVIS (HCC): ICD-10-CM

## 2022-11-12 PROCEDURE — 76775 US EXAM ABDO BACK WALL LIM: CPT

## 2022-11-15 DIAGNOSIS — N39.0 URINARY TRACT INFECTION WITHOUT HEMATURIA, SITE UNSPECIFIED: Primary | ICD-10-CM

## 2022-11-15 RX ORDER — SULFAMETHOXAZOLE AND TRIMETHOPRIM 800; 160 MG/1; MG/1
1 TABLET ORAL 2 TIMES DAILY
Qty: 14 TABLET | Refills: 0 | Status: SHIPPED | OUTPATIENT
Start: 2022-11-15 | End: 2022-11-22

## 2022-11-16 ENCOUNTER — TELEPHONE (OUTPATIENT)
Dept: UROLOGY | Age: 82
End: 2022-11-16

## 2022-11-17 NOTE — TELEPHONE ENCOUNTER
Called daughter. She states that pt doesn't have sulfa allergy, she is allergic to PCN. Discussed the KATHRYN which shows 3 cm right renal mass. I discussed possible cryoablation of the mass. Daughter reminded me that the pt is demented and she and her brother don't want any curative tx. I offered surveillance with KATHRYN in 2023, she will call me if she wants that set up. She appears to be asymptomatic from her UTI, will not treat.

## 2024-03-04 ENCOUNTER — TELEPHONE (OUTPATIENT)
Dept: NEUROLOGY | Age: 84
End: 2024-03-04

## 2024-03-04 NOTE — TELEPHONE ENCOUNTER
Daughter states that patient has been very paranoid and fearful lately. If she cannot get a hold of her she will call over and over again. She has an appt on 4/10 but does she need to be seen sooner?

## 2024-03-07 NOTE — TELEPHONE ENCOUNTER
KARL:   Spoke to daughter.  She is going to have her checked for UTI which is reasonable.      We also discussed other issues with anxiety and agitation that can come with dementia.  She will let us know how things develop and may reach out to us again if we need to start medication prior to her next appointment.

## 2024-04-10 ENCOUNTER — OFFICE VISIT (OUTPATIENT)
Dept: NEUROLOGY | Age: 84
End: 2024-04-10
Payer: MEDICARE

## 2024-04-10 VITALS
DIASTOLIC BLOOD PRESSURE: 59 MMHG | HEART RATE: 68 BPM | WEIGHT: 128 LBS | HEIGHT: 65 IN | SYSTOLIC BLOOD PRESSURE: 138 MMHG | BODY MASS INDEX: 21.33 KG/M2

## 2024-04-10 DIAGNOSIS — G30.1 MILD LATE ONSET ALZHEIMER'S DEMENTIA WITHOUT BEHAVIORAL DISTURBANCE, PSYCHOTIC DISTURBANCE, MOOD DISTURBANCE, OR ANXIETY (HCC): ICD-10-CM

## 2024-04-10 DIAGNOSIS — F02.A0 MILD LATE ONSET ALZHEIMER'S DEMENTIA WITHOUT BEHAVIORAL DISTURBANCE, PSYCHOTIC DISTURBANCE, MOOD DISTURBANCE, OR ANXIETY (HCC): ICD-10-CM

## 2024-04-10 PROCEDURE — 3075F SYST BP GE 130 - 139MM HG: CPT | Performed by: PSYCHIATRY & NEUROLOGY

## 2024-04-10 PROCEDURE — G8420 CALC BMI NORM PARAMETERS: HCPCS | Performed by: PSYCHIATRY & NEUROLOGY

## 2024-04-10 PROCEDURE — G8427 DOCREV CUR MEDS BY ELIG CLIN: HCPCS | Performed by: PSYCHIATRY & NEUROLOGY

## 2024-04-10 PROCEDURE — 1036F TOBACCO NON-USER: CPT | Performed by: PSYCHIATRY & NEUROLOGY

## 2024-04-10 PROCEDURE — G8399 PT W/DXA RESULTS DOCUMENT: HCPCS | Performed by: PSYCHIATRY & NEUROLOGY

## 2024-04-10 PROCEDURE — 1090F PRES/ABSN URINE INCON ASSESS: CPT | Performed by: PSYCHIATRY & NEUROLOGY

## 2024-04-10 PROCEDURE — 99214 OFFICE O/P EST MOD 30 MIN: CPT | Performed by: PSYCHIATRY & NEUROLOGY

## 2024-04-10 PROCEDURE — 1123F ACP DISCUSS/DSCN MKR DOCD: CPT | Performed by: PSYCHIATRY & NEUROLOGY

## 2024-04-10 PROCEDURE — 3078F DIAST BP <80 MM HG: CPT | Performed by: PSYCHIATRY & NEUROLOGY

## 2024-04-10 RX ORDER — DONEPEZIL HYDROCHLORIDE 10 MG/1
10 TABLET, FILM COATED ORAL NIGHTLY
Qty: 90 TABLET | Refills: 3 | Status: SHIPPED | OUTPATIENT
Start: 2024-04-10

## 2024-04-10 ASSESSMENT — ENCOUNTER SYMPTOMS
ABDOMINAL PAIN: 0
COUGH: 0

## 2024-04-10 NOTE — PROGRESS NOTES
[facility] NEUROLOGY  [address] [place] [address] Dr, [address]  [place] [address]  Phone: [phone] Fax [phone]  Edison Powell MD      Patient: Joy Recinos  Provider: Edison Powell MD    CC:   Chief Complaint   Patient presents with    Follow-up     Dementia      Referring Provider: Aime Amezcua MD    History of Present Illness:     Joy Recinos is a 81 y.o. RH female who presents for follow up of dementia.    She is accompanied by her daughter. She was last seen April 2023.    She presents for follow-up and continued management of progressive memory loss consistent with dementia.  Etiology includes possible  Alzheimer's disease with some possible contributions from cerebrovascular disease.    Current medications include:  Donepezil 10 mg at night  Wellbutrin 300 mg daily  Xanax 0.25 mg at night    Previous medication trials include: N/A    Patient presents today for follow-up.  It has been approximately a year since her last visit.  Things continue to be relatively stable.  Her daughter notes some interval slow decline with respect to her memory.  She continues to reside at [facility] and patient enjoys it there and has no complaints.  She participates in group activities during the day and she enjoys walking every day on a large walking track.  She continues to walk without the use of any assistive devices.  There has been no recent falls.    She continues to exhibit very poor short-term memory with a tendency to repeat herself throughout the examination.  There has been no behavioral disturbances.  Mood has been stable.  No psychosis.  She reports sleeping very well.  Patient no longer drives and the facility administers her medications.      Review of Systems:   Review of Systems   Constitutional:  Negative for fever.   HENT:  Positive for hearing loss.    Eyes:  Negative for visual disturbance.   Respiratory:  Negative for cough.    Cardiovascular:  Negative for chest

## 2024-10-27 ENCOUNTER — HOSPITAL ENCOUNTER (EMERGENCY)
Age: 84
Discharge: OTHER INSTITUTION | End: 2024-10-27
Payer: MEDICARE

## 2024-10-27 ENCOUNTER — APPOINTMENT (OUTPATIENT)
Dept: CT IMAGING | Age: 84
End: 2024-10-27
Payer: MEDICARE

## 2024-10-27 ENCOUNTER — APPOINTMENT (OUTPATIENT)
Dept: GENERAL RADIOLOGY | Age: 84
End: 2024-10-27
Payer: MEDICARE

## 2024-10-27 VITALS
RESPIRATION RATE: 16 BRPM | BODY MASS INDEX: 20.25 KG/M2 | TEMPERATURE: 97.4 F | HEART RATE: 70 BPM | OXYGEN SATURATION: 100 % | DIASTOLIC BLOOD PRESSURE: 58 MMHG | SYSTOLIC BLOOD PRESSURE: 168 MMHG | WEIGHT: 126 LBS | HEIGHT: 66 IN

## 2024-10-27 DIAGNOSIS — E86.0 DEHYDRATION, MILD: ICD-10-CM

## 2024-10-27 DIAGNOSIS — N30.91 CYSTITIS WITH HEMATURIA: ICD-10-CM

## 2024-10-27 DIAGNOSIS — G89.29 CHRONIC RIGHT HIP PAIN: Primary | ICD-10-CM

## 2024-10-27 DIAGNOSIS — M25.551 CHRONIC RIGHT HIP PAIN: Primary | ICD-10-CM

## 2024-10-27 PROBLEM — F03.90 DEMENTIA (HCC): Status: ACTIVE | Noted: 2019-08-15

## 2024-10-27 PROBLEM — E11.9 DIABETES MELLITUS (HCC): Status: ACTIVE | Noted: 2019-08-15

## 2024-10-27 PROBLEM — I10 HYPERTENSIVE DISORDER: Status: ACTIVE | Noted: 2019-08-15

## 2024-10-27 PROBLEM — C64.9 RENAL CELL CARCINOMA (HCC): Status: ACTIVE | Noted: 2019-08-15

## 2024-10-27 PROBLEM — R39.89 URINARY PROBLEM: Status: ACTIVE | Noted: 2017-01-19

## 2024-10-27 PROBLEM — F32.A DEPRESSIVE DISORDER: Status: ACTIVE | Noted: 2019-08-15

## 2024-10-27 PROBLEM — F41.1 GENERALIZED ANXIETY DISORDER: Status: ACTIVE | Noted: 2019-08-15

## 2024-10-27 PROBLEM — K21.9 GASTROESOPHAGEAL REFLUX DISEASE: Status: ACTIVE | Noted: 2019-08-15

## 2024-10-27 PROBLEM — K58.9 IRRITABLE BOWEL SYNDROME: Status: ACTIVE | Noted: 2019-09-03

## 2024-10-27 LAB
ALBUMIN SERPL-MCNC: 3.8 G/DL (ref 3.2–4.6)
ALBUMIN/GLOB SERPL: 1.1 (ref 1–1.9)
ALP SERPL-CCNC: 112 U/L (ref 35–104)
ALT SERPL-CCNC: 18 U/L (ref 8–45)
ANION GAP SERPL CALC-SCNC: 14 MMOL/L (ref 9–18)
AST SERPL-CCNC: 24 U/L (ref 15–37)
BACTERIA URNS QL MICRO: ABNORMAL /HPF
BASOPHILS # BLD: 0 K/UL (ref 0–0.2)
BASOPHILS NFR BLD: 1 % (ref 0–2)
BILIRUB SERPL-MCNC: 0.5 MG/DL (ref 0–1.2)
BILIRUB UR QL: NEGATIVE
BUN SERPL-MCNC: 26 MG/DL (ref 8–23)
CALCIUM SERPL-MCNC: 9.8 MG/DL (ref 8.8–10.2)
CHLORIDE SERPL-SCNC: 98 MMOL/L (ref 98–107)
CO2 SERPL-SCNC: 21 MMOL/L (ref 20–28)
CREAT SERPL-MCNC: 1.37 MG/DL (ref 0.6–1.1)
DIFFERENTIAL METHOD BLD: NORMAL
EKG ATRIAL RATE: 61 BPM
EKG DIAGNOSIS: NORMAL
EKG P AXIS: 76 DEGREES
EKG P-R INTERVAL: 168 MS
EKG Q-T INTERVAL: 414 MS
EKG QRS DURATION: 84 MS
EKG QTC CALCULATION (BAZETT): 416 MS
EKG R AXIS: -39 DEGREES
EKG T AXIS: 70 DEGREES
EKG VENTRICULAR RATE: 61 BPM
EOSINOPHIL # BLD: 0.2 K/UL (ref 0–0.8)
EOSINOPHIL NFR BLD: 2 % (ref 0.5–7.8)
EPI CELLS #/AREA URNS HPF: ABNORMAL /HPF
ERYTHROCYTE [DISTWIDTH] IN BLOOD BY AUTOMATED COUNT: 12.9 % (ref 11.9–14.6)
GLOBULIN SER CALC-MCNC: 3.5 G/DL (ref 2.3–3.5)
GLUCOSE SERPL-MCNC: 167 MG/DL (ref 70–99)
GLUCOSE UR QL STRIP.AUTO: NEGATIVE MG/DL
HCT VFR BLD AUTO: 38.2 % (ref 35.8–46.3)
HGB BLD-MCNC: 12.5 G/DL (ref 11.7–15.4)
HYALINE CASTS URNS QL MICRO: ABNORMAL /LPF
IMM GRANULOCYTES # BLD AUTO: 0 K/UL (ref 0–0.5)
IMM GRANULOCYTES NFR BLD AUTO: 0 % (ref 0–5)
KETONES UR-MCNC: NEGATIVE MG/DL
LEUKOCYTE ESTERASE UR QL STRIP: ABNORMAL
LYMPHOCYTES # BLD: 1.8 K/UL (ref 0.5–4.6)
LYMPHOCYTES NFR BLD: 24 % (ref 13–44)
MCH RBC QN AUTO: 30.5 PG (ref 26.1–32.9)
MCHC RBC AUTO-ENTMCNC: 32.7 G/DL (ref 31.4–35)
MCV RBC AUTO: 93.2 FL (ref 82–102)
MONOCYTES # BLD: 0.7 K/UL (ref 0.1–1.3)
MONOCYTES NFR BLD: 9 % (ref 4–12)
NEUTS SEG # BLD: 4.9 K/UL (ref 1.7–8.2)
NEUTS SEG NFR BLD: 64 % (ref 43–78)
NITRITE UR QL: NEGATIVE
NRBC # BLD: 0 K/UL (ref 0–0.2)
PH UR: 6.5 (ref 5–9)
PLATELET # BLD AUTO: 254 K/UL (ref 150–450)
PMV BLD AUTO: 10.9 FL (ref 9.4–12.3)
POTASSIUM SERPL-SCNC: 4.1 MMOL/L (ref 3.5–5.1)
PROT SERPL-MCNC: 7.3 G/DL (ref 6.3–8.2)
PROT UR QL: NEGATIVE MG/DL
RBC # BLD AUTO: 4.1 M/UL (ref 4.05–5.2)
RBC # UR STRIP: ABNORMAL
RBC #/AREA URNS HPF: ABNORMAL /HPF
SERVICE CMNT-IMP: ABNORMAL
SODIUM SERPL-SCNC: 133 MMOL/L (ref 136–145)
SP GR UR: 1.01 (ref 1–1.02)
UROBILINOGEN UR QL: 0.2 EU/DL (ref 0.2–1)
WBC # BLD AUTO: 7.7 K/UL (ref 4.3–11.1)
WBC URNS QL MICRO: ABNORMAL /HPF

## 2024-10-27 PROCEDURE — 6370000000 HC RX 637 (ALT 250 FOR IP)

## 2024-10-27 PROCEDURE — 86901 BLOOD TYPING SEROLOGIC RH(D): CPT

## 2024-10-27 PROCEDURE — 87088 URINE BACTERIA CULTURE: CPT

## 2024-10-27 PROCEDURE — 6360000002 HC RX W HCPCS

## 2024-10-27 PROCEDURE — 81001 URINALYSIS AUTO W/SCOPE: CPT

## 2024-10-27 PROCEDURE — 96374 THER/PROPH/DIAG INJ IV PUSH: CPT

## 2024-10-27 PROCEDURE — 87186 SC STD MICRODIL/AGAR DIL: CPT

## 2024-10-27 PROCEDURE — 86850 RBC ANTIBODY SCREEN: CPT

## 2024-10-27 PROCEDURE — 93005 ELECTROCARDIOGRAM TRACING: CPT

## 2024-10-27 PROCEDURE — 85025 COMPLETE CBC W/AUTO DIFF WBC: CPT

## 2024-10-27 PROCEDURE — 80053 COMPREHEN METABOLIC PANEL: CPT

## 2024-10-27 PROCEDURE — 86900 BLOOD TYPING SEROLOGIC ABO: CPT

## 2024-10-27 PROCEDURE — 93010 ELECTROCARDIOGRAM REPORT: CPT | Performed by: INTERNAL MEDICINE

## 2024-10-27 PROCEDURE — 73700 CT LOWER EXTREMITY W/O DYE: CPT

## 2024-10-27 PROCEDURE — 99285 EMERGENCY DEPT VISIT HI MDM: CPT

## 2024-10-27 PROCEDURE — 96361 HYDRATE IV INFUSION ADD-ON: CPT

## 2024-10-27 PROCEDURE — 73502 X-RAY EXAM HIP UNI 2-3 VIEWS: CPT

## 2024-10-27 PROCEDURE — 87086 URINE CULTURE/COLONY COUNT: CPT

## 2024-10-27 PROCEDURE — 2580000003 HC RX 258

## 2024-10-27 RX ORDER — 0.9 % SODIUM CHLORIDE 0.9 %
1000 INTRAVENOUS SOLUTION INTRAVENOUS ONCE
Status: COMPLETED | OUTPATIENT
Start: 2024-10-27 | End: 2024-10-27

## 2024-10-27 RX ORDER — LIDOCAINE 50 MG/G
1 PATCH TOPICAL DAILY
Qty: 30 PATCH | Refills: 0 | Status: SHIPPED | OUTPATIENT
Start: 2024-10-27 | End: 2024-11-26

## 2024-10-27 RX ORDER — LIDOCAINE 4 G/G
1 PATCH TOPICAL DAILY
Qty: 30 EACH | Refills: 0 | Status: SHIPPED | OUTPATIENT
Start: 2024-10-27 | End: 2024-11-26

## 2024-10-27 RX ORDER — DEXAMETHASONE SODIUM PHOSPHATE 10 MG/ML
10 INJECTION INTRAMUSCULAR; INTRAVENOUS
Status: COMPLETED | OUTPATIENT
Start: 2024-10-27 | End: 2024-10-27

## 2024-10-27 RX ORDER — CEFDINIR 300 MG/1
300 CAPSULE ORAL DAILY
Qty: 7 CAPSULE | Refills: 0 | Status: SHIPPED | OUTPATIENT
Start: 2024-10-27 | End: 2024-11-03

## 2024-10-27 RX ORDER — LIDOCAINE 4 G/G
1 PATCH TOPICAL ONCE
Status: DISCONTINUED | OUTPATIENT
Start: 2024-10-27 | End: 2024-10-27 | Stop reason: HOSPADM

## 2024-10-27 RX ORDER — PREDNISONE 20 MG/1
10 TABLET ORAL DAILY
Qty: 3 TABLET | Refills: 0 | Status: SHIPPED | OUTPATIENT
Start: 2024-10-27 | End: 2024-11-01

## 2024-10-27 RX ADMIN — DEXAMETHASONE SODIUM PHOSPHATE 10 MG: 10 INJECTION INTRAMUSCULAR; INTRAVENOUS at 19:17

## 2024-10-27 RX ADMIN — SODIUM CHLORIDE 1000 ML: 9 INJECTION, SOLUTION INTRAVENOUS at 19:12

## 2024-10-27 ASSESSMENT — PAIN SCALES - GENERAL: PAINLEVEL_OUTOF10: 9

## 2024-10-27 ASSESSMENT — PAIN - FUNCTIONAL ASSESSMENT: PAIN_FUNCTIONAL_ASSESSMENT: 0-10

## 2024-10-27 NOTE — ED TRIAGE NOTES
Patient with right hip pain x several days worse in last 48hrs. Patient denies any injury or trauma, family reports facility where she resides has not alerted her to any falls.

## 2024-10-27 NOTE — ED NOTES
Pt to the bathroom with stand by assist. Urine sample collected. Pt provided with a sandwich tray and warm Chamomile Tea. Pt confused about the amount of times she has urinated. States she \"hasn't used the bathroom in a long time and that isn't like me.\" Daughter at bedside shook her head to let me know that wasn't real. Pt pleasant and eating at this time.

## 2024-10-27 NOTE — ED PROVIDER NOTES
Emergency Department Provider Note       PCP: Vielka Dang MD   Age: 84 y.o.   Sex: female     DISPOSITION Decision To Discharge 10/27/2024 08:13:15 PM    ICD-10-CM    1. Chronic right hip pain  M25.551 lidocaine (LIDODERM) 5 %    G89.29 lidocaine 4 % external patch     predniSONE (DELTASONE) 20 MG tablet     [facility]      2. Dehydration, mild  E86.0       3. Cystitis with hematuria  N30.91 cefdinir (OMNICEF) 300 MG capsule          Medical Decision Making     Vital signs reviewed, patient stable, NAD, afebrile, nontoxic in appearance     Blood pressure 176/51, heart rate 63, temperature 97.4, O2 saturation 100% on room air    84-year-old female presents for progressively worsening right hip pain over the past week and a half without known injury or trauma.  Today unable to stand and bear weight or ambulate on her own.  Really struggling according to the daughter.  Crying with trying to bear weight.    Based on physical exam we will obtain an x-ray of right hip.  Considering possible need for CT imaging of right hip if no fracture noted  We will obtain some basic lab work, type and screen and EKG in case there is a fracture    Patient's daughter states patient does not tolerate opioids as they cause her to fight.    X-ray of right hip is negative for acute bony abnormality, fracture or dislocation.    Went ahead and ordered a CT of the hip.    Patient given some Decadron and a lidocaine patch placed the area of pain on her hip.    Overall lab work today without any emergent findings.  There is some signs of some mild dehydration without evidence of an SEFERINO.  I did have to review lab work from primary care provider on patient's daughter's tablet as we cannot see that lab work here.    EKG without any emergent findings    CT of the hip is negative for acute bony abnormality, fracture or dislocation.    Patient's pain is improved significantly with lidocaine patch and some

## 2024-10-28 LAB
ABO + RH BLD: NORMAL
BLOOD GROUP ANTIBODIES SERPL: NORMAL
SPECIMEN EXP DATE BLD: NORMAL

## 2024-10-28 NOTE — DISCHARGE INSTRUCTIONS
Seen in the emergency department today for right hip pain.  Overall physical exam is very reassuring.    Lab work today looks like some mild dehydration so she was given some fluids here today.    X-rays and CT of her right hip are negative for any evidence of fracture or dislocation.  Suspect that this is likely arthritic in nature.    It is reassuring that her pain is improved with some steroids and a lidocaine patch.    I have placed a referral to orthopedics for her for her chronic right hip pain    I have written for a 5% lidocaine patch.  If this is not covered by insurance and is very expensive, do not pick it up  the 4% instead      I written for low-dose steroid to be started tomorrow.  Take it in the morning and take it with food.  If blood sugars are consistently becoming significantly elevated you can just stop the steroid    She does have a urinary tract infection.  Urine has been sent for culture.  I have written her prescription for cefdinir to be taken daily.  She or you will receive a phone call if urine culture needs an antibiotic change.    Schedule a follow-up with primary care provider within the next week or 2    Return to the emergency department for any new or worsening pain, chest pain, shortness of breath, signs or symptoms of stroke as listed in discharge paperwork

## 2024-10-31 LAB
BACTERIA SPEC CULT: ABNORMAL
SERVICE CMNT-IMP: ABNORMAL

## 2024-11-06 ENCOUNTER — OFFICE VISIT (OUTPATIENT)
Dept: ORTHOPEDIC SURGERY | Age: 84
End: 2024-11-06

## 2024-11-06 DIAGNOSIS — M25.551 RIGHT HIP PAIN: Primary | ICD-10-CM

## 2024-11-06 DIAGNOSIS — M70.71 BURSITIS OF RIGHT HIP, UNSPECIFIED BURSA: ICD-10-CM

## 2024-11-06 RX ORDER — METHYLPREDNISOLONE ACETATE 40 MG/ML
40 INJECTION, SUSPENSION INTRA-ARTICULAR; INTRALESIONAL; INTRAMUSCULAR; SOFT TISSUE ONCE
Status: COMPLETED | OUTPATIENT
Start: 2024-11-06 | End: 2024-11-06

## 2024-11-06 RX ADMIN — METHYLPREDNISOLONE ACETATE 40 MG: 40 INJECTION, SUSPENSION INTRA-ARTICULAR; INTRALESIONAL; INTRAMUSCULAR; SOFT TISSUE at 13:28

## 2024-11-06 NOTE — PROGRESS NOTES
10 Units into the skin 2 times daily    spironolactone (ALDACTONE) 25 MG tablet Take 1 tablet by mouth daily    valsartan (DIOVAN) 320 MG tablet Take 1 tablet by mouth daily     No current facility-administered medications for this visit.       Past Medical history:  Past Medical History:   Diagnosis Date    Anxiety     Diabetes (HCC)     Type 2- 140-160 avg- oral and insulin- hypo below 60 A1C 6.2 1/9/2019    Diarrhea 1/8/2019    Encephalopathy 1/23/2019    Endocrine disease     Hypertension     Hyponatremia 1/8/2019    Kidney stones     Menopause     Metabolic encephalopathy 1/24/2019    Renal mass         has a past surgical history that includes Ovary removal; Hysterectomy; removal of kidney stone; lap,cholecystectomy; and total nephrectomy (Right).     Family History:  [unfilled]     Social History:  [unfilled]    Review of Systems:         PHYSICAL EXAMINATION:   The patient appears their stated age and they are in no distress.  Circulation is normal.  Skin is normal.  Sensation is intact.    General medical appearance is normal.  The gait is noted to be without Trendelenburg.  Palpation reveals tenderness over the greater trochanter.  Range of motion is full.  Straight leg test is negative.  Stability is normal.  Muscular strength is intact.  The opposite hip reveals normal range of motion and no abnormal tenderness on palpation.    Radiographs:    I did independently review her images from the hospital which include an AP pelvis and lateral the right hip along with her CT scans.  There is some mild degenerative change in the right hip but no sign of fracture or acute issue.    Radiographic impression: Mild DJD right hip    IMPRESSION:  The patient has trochanteric bursitis of the right hip    RECOMMENDATIONS  We discussed treatment options such as NSAIDs and PT.  They were informed this is not usually a surgical remedied issue.      Procedure note:  Today placed 40mg  depomedrol and 1 cc of 0.5%

## 2024-11-07 ENCOUNTER — TELEPHONE (OUTPATIENT)
Dept: ORTHOPEDIC SURGERY | Age: 84
End: 2024-11-07

## 2024-11-11 ENCOUNTER — TELEPHONE (OUTPATIENT)
Dept: ORTHOPEDIC SURGERY | Age: 84
End: 2024-11-11

## 2024-12-09 ENCOUNTER — OFFICE VISIT (OUTPATIENT)
Dept: ORTHOPEDIC SURGERY | Age: 84
End: 2024-12-09

## 2024-12-09 DIAGNOSIS — M25.511 CHRONIC RIGHT SHOULDER PAIN: Primary | ICD-10-CM

## 2024-12-09 DIAGNOSIS — M67.911 TENDINOPATHY OF RIGHT ROTATOR CUFF: ICD-10-CM

## 2024-12-09 DIAGNOSIS — G89.29 CHRONIC RIGHT SHOULDER PAIN: Primary | ICD-10-CM

## 2024-12-09 RX ORDER — METHYLPREDNISOLONE ACETATE 40 MG/ML
40 INJECTION, SUSPENSION INTRA-ARTICULAR; INTRALESIONAL; INTRAMUSCULAR; SOFT TISSUE ONCE
Status: COMPLETED | OUTPATIENT
Start: 2024-12-09 | End: 2024-12-09

## 2024-12-09 RX ADMIN — METHYLPREDNISOLONE ACETATE 40 MG: 40 INJECTION, SUSPENSION INTRA-ARTICULAR; INTRALESIONAL; INTRAMUSCULAR; SOFT TISSUE at 10:32

## 2024-12-09 NOTE — PROGRESS NOTES
.  
swab. The patient tolerated this procedure well with no adverse events. There was some notable pain relief reported by the patient prior to leaving the office today. The patient was counseled not to submerge the site for 24 hours, not to perform strenuous activity for the next five days, and if notes any signs or symptoms consistent with joint infection or allergic reaction to go to a local emergency room. The patient was observed for 15 minutes postprocedure and was allowed to be discharged from clinic in their usual state of health.

## 2025-04-07 DIAGNOSIS — G30.1 MILD LATE ONSET ALZHEIMER'S DEMENTIA WITHOUT BEHAVIORAL DISTURBANCE, PSYCHOTIC DISTURBANCE, MOOD DISTURBANCE, OR ANXIETY (HCC): Primary | ICD-10-CM

## 2025-04-07 DIAGNOSIS — F02.A0 MILD LATE ONSET ALZHEIMER'S DEMENTIA WITHOUT BEHAVIORAL DISTURBANCE, PSYCHOTIC DISTURBANCE, MOOD DISTURBANCE, OR ANXIETY (HCC): Primary | ICD-10-CM

## (undated) DEVICE — VISUALIZATION SYSTEM: Brand: CLEARIFY

## (undated) DEVICE — DRAPE TWL SURG 16X26IN BLU ORB04] ALLCARE INC]

## (undated) DEVICE — NEEDLE HYPO 21GA L1.5IN INTRAMUSCULAR S STL LATCH BVL UP

## (undated) DEVICE — FLOSEAL HEMOSTATIC MATRIX, 5 ML: Brand: FLOSEAL

## (undated) DEVICE — TROCAR: Brand: KII FIOS FIRST ENTRY

## (undated) DEVICE — SUTURE PDS II SZ 1 L96IN ABSRB VLT TP-1 L65MM 1/2 CIR Z880G

## (undated) DEVICE — SPONGE GZ W4XL4IN COT 12 PLY TYP VII WVN C FLD DSGN

## (undated) DEVICE — KENDALL SCD EXPRESS SLEEVES, KNEE LENGTH, MEDIUM: Brand: KENDALL SCD

## (undated) DEVICE — REM POLYHESIVE ADULT PATIENT RETURN ELECTRODE: Brand: VALLEYLAB

## (undated) DEVICE — MARYLAND JAW LAPAROSCOPIC SEALER/DIVIDER COATED: Brand: LIGASURE

## (undated) DEVICE — SUTURE VCRL SZ 0 L54IN ABSRB UD POLYGLACTIN 910 COAT BRAID J608H

## (undated) DEVICE — TRAY CATH 16F URIN MTR LTX -- CONVERT TO ITEM 363111

## (undated) DEVICE — DRAIN SURG L49IN DIA1/4IN 10IN H SIL W/O TRCR END PERF

## (undated) DEVICE — BLADE SCALP SURG BARD-PARK 10 --

## (undated) DEVICE — BASIC SINGLE BASIN-LF: Brand: MEDLINE INDUSTRIES, INC.

## (undated) DEVICE — AGENT HEMSTAT W2XL14IN OXIDIZED REGENERATED CELOS ABSRB FOR

## (undated) DEVICE — APPLICATOR SEAL FLOSEAL 5MM+ --

## (undated) DEVICE — BLUNT DISSECTOR: Brand: ENDO PEANUT

## (undated) DEVICE — CRADLE POS 3X5X24IN RASPBERRY ARM PRONE FOAM DISP

## (undated) DEVICE — INTENDED FOR TISSUE SEPARATION, AND OTHER PROCEDURES THAT REQUIRE A SHARP SURGICAL BLADE TO PUNCTURE OR CUT.: Brand: BARD-PARKER ® STAINLESS STEEL BLADES

## (undated) DEVICE — MAGNETIC DRAPE: Brand: DEVON

## (undated) DEVICE — [HIGH FLOW INSUFFLATOR,  DO NOT USE IF PACKAGE IS DAMAGED,  KEEP DRY,  KEEP AWAY FROM SUNLIGHT,  PROTECT FROM HEAT AND RADIOACTIVE SOURCES.]: Brand: PNEUMOSURE

## (undated) DEVICE — LOGICUT SCISSOR LENGTH 320MM: Brand: LOGI - LAPAROSCOPIC INSTRUMENT SYSTEM

## (undated) DEVICE — (D)PREP SKN CHLRAPRP APPL 26ML -- CONVERT TO ITEM 371833

## (undated) DEVICE — SPONGE LAP 18X18IN STRL -- 5/PK

## (undated) DEVICE — APPLICATOR FBR TIP L6IN COT TIP WOOD SHFT SWAB 2000 PER CA

## (undated) DEVICE — SUT SLK 2-0SH 30IN BLK --

## (undated) DEVICE — 2000CC GUARDIAN II: Brand: GUARDIAN

## (undated) DEVICE — Device

## (undated) DEVICE — 3M™ IOBAN™ 2 ANTIMICROBIAL INCISE DRAPE 6650EZ: Brand: IOBAN™ 2

## (undated) DEVICE — 2, DISPOSABLE SUCTION/IRRIGATOR WITHOUT DISPOSABLE TIP: Brand: STRYKEFLOW

## (undated) DEVICE — BLADELESS OPTICAL TROCAR WITH FIXATION CANNULA: Brand: VERSAPORT

## (undated) DEVICE — BUTTON SWITCH PENCIL BLADE ELECTRODE, HOLSTER: Brand: EDGE

## (undated) DEVICE — SURGIFOAM SPNG SZ 100

## (undated) DEVICE — SUTURE VCRL SZ 0 L36IN ABSRB UD L36MM CT-1 1/2 CIR J946H

## (undated) DEVICE — LAP CHOLE: Brand: MEDLINE INDUSTRIES, INC.

## (undated) DEVICE — ACCESS PLATFORM FOR MINIMALLY INVASIVE SURGERY.: Brand: GELPORT® LAPAROSCOPIC  SYSTEM

## (undated) DEVICE — GOWN,REINFORCED,POLY,AURORA,XXLARGE,STR: Brand: MEDLINE

## (undated) DEVICE — FOLEY TRAY DRAINAGE BG 16 FR ANTI REFLX CHMBR COMPLETE CARE

## (undated) DEVICE — ARGON HANDSET: Brand: VALLEYLAB

## (undated) DEVICE — CLIP SUT ENDOSCP F/2-0/3-0/4-0 -- LAPRA-TY